# Patient Record
Sex: MALE | Race: WHITE | NOT HISPANIC OR LATINO | Employment: OTHER | URBAN - METROPOLITAN AREA
[De-identification: names, ages, dates, MRNs, and addresses within clinical notes are randomized per-mention and may not be internally consistent; named-entity substitution may affect disease eponyms.]

---

## 2022-01-01 ENCOUNTER — APPOINTMENT (OUTPATIENT)
Dept: SPEECH THERAPY | Facility: CLINIC | Age: 77
DRG: 064 | End: 2022-01-01
Payer: MEDICARE

## 2022-01-01 ENCOUNTER — APPOINTMENT (OUTPATIENT)
Dept: OCCUPATIONAL THERAPY | Facility: CLINIC | Age: 77
DRG: 064 | End: 2022-01-01
Payer: MEDICARE

## 2022-01-01 ENCOUNTER — APPOINTMENT (OUTPATIENT)
Dept: GENERAL RADIOLOGY | Facility: CLINIC | Age: 77
DRG: 064 | End: 2022-01-01
Attending: EMERGENCY MEDICINE
Payer: MEDICARE

## 2022-01-01 ENCOUNTER — APPOINTMENT (OUTPATIENT)
Dept: CT IMAGING | Facility: CLINIC | Age: 77
DRG: 064 | End: 2022-01-01
Attending: EMERGENCY MEDICINE
Payer: MEDICARE

## 2022-01-01 ENCOUNTER — APPOINTMENT (OUTPATIENT)
Dept: PHYSICAL THERAPY | Facility: CLINIC | Age: 77
DRG: 064 | End: 2022-01-01
Payer: MEDICARE

## 2022-01-01 ENCOUNTER — APPOINTMENT (OUTPATIENT)
Dept: GENERAL RADIOLOGY | Facility: CLINIC | Age: 77
End: 2022-01-01
Attending: PHYSICIAN ASSISTANT
Payer: MEDICARE

## 2022-01-01 ENCOUNTER — APPOINTMENT (OUTPATIENT)
Dept: GENERAL RADIOLOGY | Facility: CLINIC | Age: 77
DRG: 064 | End: 2022-01-01
Attending: INTERNAL MEDICINE
Payer: MEDICARE

## 2022-01-01 ENCOUNTER — APPOINTMENT (OUTPATIENT)
Dept: CT IMAGING | Facility: CLINIC | Age: 77
DRG: 064 | End: 2022-01-01
Attending: INTERNAL MEDICINE
Payer: MEDICARE

## 2022-01-01 ENCOUNTER — APPOINTMENT (OUTPATIENT)
Dept: CARDIOLOGY | Facility: CLINIC | Age: 77
DRG: 064 | End: 2022-01-01
Attending: HOSPITALIST
Payer: MEDICARE

## 2022-01-01 ENCOUNTER — HOSPITAL ENCOUNTER (EMERGENCY)
Facility: CLINIC | Age: 77
Discharge: HOME | End: 2022-10-10
Attending: PHYSICIAN ASSISTANT | Admitting: PHYSICIAN ASSISTANT
Payer: MEDICARE

## 2022-01-01 ENCOUNTER — APPOINTMENT (OUTPATIENT)
Dept: ULTRASOUND IMAGING | Facility: CLINIC | Age: 77
End: 2022-01-01
Attending: PHYSICIAN ASSISTANT
Payer: MEDICARE

## 2022-01-01 ENCOUNTER — APPOINTMENT (OUTPATIENT)
Dept: MRI IMAGING | Facility: CLINIC | Age: 77
DRG: 064 | End: 2022-01-01
Attending: EMERGENCY MEDICINE
Payer: MEDICARE

## 2022-01-01 ENCOUNTER — HOSPITAL ENCOUNTER (INPATIENT)
Facility: CLINIC | Age: 77
End: 2022-01-01
Payer: MEDICARE

## 2022-01-01 ENCOUNTER — APPOINTMENT (OUTPATIENT)
Dept: CT IMAGING | Facility: CLINIC | Age: 77
DRG: 064 | End: 2022-01-01
Attending: HOSPITALIST
Payer: MEDICARE

## 2022-01-01 ENCOUNTER — HOSPITAL ENCOUNTER (INPATIENT)
Facility: CLINIC | Age: 77
LOS: 9 days | Discharge: NURSING, REHAB OR LONG-TERM CARE | DRG: 064 | End: 2022-11-16
Attending: EMERGENCY MEDICINE | Admitting: INTERNAL MEDICINE
Payer: MEDICARE

## 2022-01-01 VITALS
DIASTOLIC BLOOD PRESSURE: 62 MMHG | BODY MASS INDEX: 28.12 KG/M2 | RESPIRATION RATE: 16 BRPM | WEIGHT: 175 LBS | HEART RATE: 68 BPM | HEIGHT: 66 IN | SYSTOLIC BLOOD PRESSURE: 137 MMHG | TEMPERATURE: 98.2 F | OXYGEN SATURATION: 98 %

## 2022-01-01 VITALS
RESPIRATION RATE: 16 BRPM | BODY MASS INDEX: 26.29 KG/M2 | OXYGEN SATURATION: 95 % | WEIGHT: 177.47 LBS | TEMPERATURE: 97.5 F | SYSTOLIC BLOOD PRESSURE: 121 MMHG | HEART RATE: 68 BPM | HEIGHT: 69 IN | DIASTOLIC BLOOD PRESSURE: 76 MMHG

## 2022-01-01 DIAGNOSIS — I50.32 CHRONIC HEART FAILURE WITH PRESERVED EJECTION FRACTION (HFPEF) (H): ICD-10-CM

## 2022-01-01 DIAGNOSIS — K59.00 CONSTIPATION, UNSPECIFIED CONSTIPATION TYPE: ICD-10-CM

## 2022-01-01 DIAGNOSIS — M79.604 PAIN OF RIGHT LOWER EXTREMITY: ICD-10-CM

## 2022-01-01 DIAGNOSIS — Z11.52 ENCOUNTER FOR SCREENING LABORATORY TESTING FOR SEVERE ACUTE RESPIRATORY SYNDROME CORONAVIRUS 2 (SARS-COV-2): ICD-10-CM

## 2022-01-01 DIAGNOSIS — R33.8 BENIGN PROSTATIC HYPERPLASIA WITH URINARY RETENTION: ICD-10-CM

## 2022-01-01 DIAGNOSIS — I63.9 ISCHEMIC STROKE (H): ICD-10-CM

## 2022-01-01 DIAGNOSIS — N40.1 BENIGN PROSTATIC HYPERPLASIA WITH URINARY RETENTION: ICD-10-CM

## 2022-01-01 DIAGNOSIS — I10 PRIMARY HYPERTENSION: Primary | ICD-10-CM

## 2022-01-01 DIAGNOSIS — I48.11 LONGSTANDING PERSISTENT ATRIAL FIBRILLATION (H): ICD-10-CM

## 2022-01-01 LAB
ALBUMIN SERPL BCG-MCNC: 3.2 G/DL (ref 3.5–5.2)
ALBUMIN UR-MCNC: 100 MG/DL
ALBUMIN UR-MCNC: 30 MG/DL
ALP SERPL-CCNC: 72 U/L (ref 40–129)
ALT SERPL W P-5'-P-CCNC: 16 U/L (ref 10–50)
AMMONIA PLAS-SCNC: 13 UMOL/L (ref 16–60)
ANION GAP SERPL CALCULATED.3IONS-SCNC: 10 MMOL/L (ref 7–15)
ANION GAP SERPL CALCULATED.3IONS-SCNC: 11 MMOL/L (ref 7–15)
ANION GAP SERPL CALCULATED.3IONS-SCNC: 4 MMOL/L (ref 7–15)
ANION GAP SERPL CALCULATED.3IONS-SCNC: 8 MMOL/L (ref 7–15)
ANION GAP SERPL CALCULATED.3IONS-SCNC: 9 MMOL/L (ref 7–15)
ANION GAP SERPL CALCULATED.3IONS-SCNC: 9 MMOL/L (ref 7–15)
APPEARANCE UR: ABNORMAL
APPEARANCE UR: CLEAR
APTT PPP: 31 SECONDS (ref 22–38)
AST SERPL W P-5'-P-CCNC: 27 U/L (ref 10–50)
BACTERIA BLD CULT: ABNORMAL
BACTERIA BLD CULT: ABNORMAL
BACTERIA BLD CULT: NO GROWTH
BASE EXCESS BLDA CALC-SCNC: 1.5 MMOL/L (ref -9–1.8)
BASOPHILS # BLD AUTO: 0 10E3/UL (ref 0–0.2)
BASOPHILS # BLD AUTO: 0 10E3/UL (ref 0–0.2)
BASOPHILS NFR BLD AUTO: 0 %
BASOPHILS NFR BLD AUTO: 0 %
BILIRUB DIRECT SERPL-MCNC: 0.32 MG/DL (ref 0–0.3)
BILIRUB SERPL-MCNC: 1.1 MG/DL
BILIRUB UR QL STRIP: ABNORMAL
BILIRUB UR QL STRIP: ABNORMAL
BUN SERPL-MCNC: 16 MG/DL (ref 8–23)
BUN SERPL-MCNC: 21.4 MG/DL (ref 8–23)
BUN SERPL-MCNC: 24.1 MG/DL (ref 8–23)
BUN SERPL-MCNC: 28.9 MG/DL (ref 8–23)
BUN SERPL-MCNC: 29.7 MG/DL (ref 8–23)
BUN SERPL-MCNC: 34.4 MG/DL (ref 8–23)
C PNEUM DNA SPEC QL NAA+PROBE: NOT DETECTED
CALCIUM SERPL-MCNC: 7.9 MG/DL (ref 8.8–10.2)
CALCIUM SERPL-MCNC: 8.3 MG/DL (ref 8.8–10.2)
CALCIUM SERPL-MCNC: 8.4 MG/DL (ref 8.8–10.2)
CALCIUM SERPL-MCNC: 8.4 MG/DL (ref 8.8–10.2)
CALCIUM SERPL-MCNC: 8.5 MG/DL (ref 8.8–10.2)
CALCIUM SERPL-MCNC: 8.8 MG/DL (ref 8.8–10.2)
CHLORIDE SERPL-SCNC: 101 MMOL/L (ref 98–107)
CHLORIDE SERPL-SCNC: 102 MMOL/L (ref 98–107)
CHLORIDE SERPL-SCNC: 99 MMOL/L (ref 98–107)
CHLORIDE SERPL-SCNC: 99 MMOL/L (ref 98–107)
CHOLEST SERPL-MCNC: 135 MG/DL
COLOR UR AUTO: ABNORMAL
COLOR UR AUTO: YELLOW
CREAT SERPL-MCNC: 0.8 MG/DL (ref 0.67–1.17)
CREAT SERPL-MCNC: 0.81 MG/DL (ref 0.67–1.17)
CREAT SERPL-MCNC: 0.84 MG/DL (ref 0.67–1.17)
CREAT SERPL-MCNC: 0.85 MG/DL (ref 0.67–1.17)
CREAT SERPL-MCNC: 0.85 MG/DL (ref 0.67–1.17)
CREAT SERPL-MCNC: 1.02 MG/DL (ref 0.67–1.17)
DEPRECATED HCO3 PLAS-SCNC: 22 MMOL/L (ref 22–29)
DEPRECATED HCO3 PLAS-SCNC: 24 MMOL/L (ref 22–29)
DEPRECATED HCO3 PLAS-SCNC: 25 MMOL/L (ref 22–29)
DEPRECATED HCO3 PLAS-SCNC: 28 MMOL/L (ref 22–29)
DEPRECATED HCO3 PLAS-SCNC: 29 MMOL/L (ref 22–29)
DEPRECATED HCO3 PLAS-SCNC: 31 MMOL/L (ref 22–29)
ENTEROCOCCUS FAECALIS: NOT DETECTED
ENTEROCOCCUS FAECIUM: NOT DETECTED
EOSINOPHIL # BLD AUTO: 0 10E3/UL (ref 0–0.7)
EOSINOPHIL # BLD AUTO: 0 10E3/UL (ref 0–0.7)
EOSINOPHIL NFR BLD AUTO: 0 %
EOSINOPHIL NFR BLD AUTO: 0 %
ERYTHROCYTE [DISTWIDTH] IN BLOOD BY AUTOMATED COUNT: 12.4 % (ref 10–15)
ERYTHROCYTE [DISTWIDTH] IN BLOOD BY AUTOMATED COUNT: 12.4 % (ref 10–15)
ERYTHROCYTE [DISTWIDTH] IN BLOOD BY AUTOMATED COUNT: 12.8 % (ref 10–15)
ERYTHROCYTE [DISTWIDTH] IN BLOOD BY AUTOMATED COUNT: 13.2 % (ref 10–15)
ERYTHROCYTE [DISTWIDTH] IN BLOOD BY AUTOMATED COUNT: 13.3 % (ref 10–15)
ETHANOL SERPL-MCNC: <0.01 G/DL
FERRITIN SERPL-MCNC: 278 NG/ML (ref 31–409)
FLUAV H1 2009 PAND RNA SPEC QL NAA+PROBE: NOT DETECTED
FLUAV H1 RNA SPEC QL NAA+PROBE: NOT DETECTED
FLUAV H3 RNA SPEC QL NAA+PROBE: DETECTED
FLUAV RNA SPEC QL NAA+PROBE: DETECTED
FLUBV RNA SPEC QL NAA+PROBE: NOT DETECTED
FOLATE SERPL-MCNC: 19.6 NG/ML (ref 4.6–34.8)
GFR SERPL CREATININE-BSD FRML MDRD: 75 ML/MIN/1.73M2
GFR SERPL CREATININE-BSD FRML MDRD: 88 ML/MIN/1.73M2
GFR SERPL CREATININE-BSD FRML MDRD: 88 ML/MIN/1.73M2
GFR SERPL CREATININE-BSD FRML MDRD: 89 ML/MIN/1.73M2
GFR SERPL CREATININE-BSD FRML MDRD: 90 ML/MIN/1.73M2
GFR SERPL CREATININE-BSD FRML MDRD: 90 ML/MIN/1.73M2
GLUCOSE BLDC GLUCOMTR-MCNC: 108 MG/DL (ref 70–99)
GLUCOSE BLDC GLUCOMTR-MCNC: 108 MG/DL (ref 70–99)
GLUCOSE BLDC GLUCOMTR-MCNC: 111 MG/DL (ref 70–99)
GLUCOSE BLDC GLUCOMTR-MCNC: 114 MG/DL (ref 70–99)
GLUCOSE BLDC GLUCOMTR-MCNC: 119 MG/DL (ref 70–99)
GLUCOSE BLDC GLUCOMTR-MCNC: 122 MG/DL (ref 70–99)
GLUCOSE BLDC GLUCOMTR-MCNC: 123 MG/DL (ref 70–99)
GLUCOSE BLDC GLUCOMTR-MCNC: 126 MG/DL (ref 70–99)
GLUCOSE BLDC GLUCOMTR-MCNC: 127 MG/DL (ref 70–99)
GLUCOSE BLDC GLUCOMTR-MCNC: 128 MG/DL (ref 70–99)
GLUCOSE BLDC GLUCOMTR-MCNC: 130 MG/DL (ref 70–99)
GLUCOSE BLDC GLUCOMTR-MCNC: 143 MG/DL (ref 70–99)
GLUCOSE BLDC GLUCOMTR-MCNC: 143 MG/DL (ref 70–99)
GLUCOSE BLDC GLUCOMTR-MCNC: 146 MG/DL (ref 70–99)
GLUCOSE BLDC GLUCOMTR-MCNC: 152 MG/DL (ref 70–99)
GLUCOSE BLDC GLUCOMTR-MCNC: 157 MG/DL (ref 70–99)
GLUCOSE BLDC GLUCOMTR-MCNC: 161 MG/DL (ref 70–99)
GLUCOSE BLDC GLUCOMTR-MCNC: 217 MG/DL (ref 70–99)
GLUCOSE BLDC GLUCOMTR-MCNC: 85 MG/DL (ref 70–99)
GLUCOSE BLDC GLUCOMTR-MCNC: 91 MG/DL (ref 70–99)
GLUCOSE SERPL-MCNC: 104 MG/DL (ref 70–99)
GLUCOSE SERPL-MCNC: 128 MG/DL (ref 70–99)
GLUCOSE SERPL-MCNC: 137 MG/DL (ref 70–99)
GLUCOSE SERPL-MCNC: 151 MG/DL (ref 70–99)
GLUCOSE SERPL-MCNC: 178 MG/DL (ref 70–99)
GLUCOSE SERPL-MCNC: 203 MG/DL (ref 70–99)
GLUCOSE UR STRIP-MCNC: NEGATIVE MG/DL
GLUCOSE UR STRIP-MCNC: NEGATIVE MG/DL
HADV DNA SPEC QL NAA+PROBE: NOT DETECTED
HBA1C MFR BLD: 5 %
HCO3 BLD-SCNC: 25 MMOL/L (ref 21–28)
HCOV PNL SPEC NAA+PROBE: NOT DETECTED
HCT VFR BLD AUTO: 28.4 % (ref 40–53)
HCT VFR BLD AUTO: 29.5 % (ref 40–53)
HCT VFR BLD AUTO: 29.8 % (ref 40–53)
HCT VFR BLD AUTO: 30.9 % (ref 40–53)
HCT VFR BLD AUTO: 31.4 % (ref 40–53)
HCT VFR BLD AUTO: 32 % (ref 40–53)
HCT VFR BLD AUTO: 33.2 % (ref 40–53)
HCT VFR BLD AUTO: 33.5 % (ref 40–53)
HCT VFR BLD AUTO: 34.3 % (ref 40–53)
HDLC SERPL-MCNC: 53 MG/DL
HGB BLD-MCNC: 10 G/DL (ref 13.3–17.7)
HGB BLD-MCNC: 10.1 G/DL (ref 13.3–17.7)
HGB BLD-MCNC: 10.5 G/DL (ref 13.3–17.7)
HGB BLD-MCNC: 10.6 G/DL (ref 13.3–17.7)
HGB BLD-MCNC: 10.7 G/DL (ref 13.3–17.7)
HGB BLD-MCNC: 11 G/DL (ref 13.3–17.7)
HGB BLD-MCNC: 11.1 G/DL (ref 13.3–17.7)
HGB BLD-MCNC: 11.7 G/DL (ref 13.3–17.7)
HGB BLD-MCNC: 9.5 G/DL (ref 13.3–17.7)
HGB UR QL STRIP: ABNORMAL
HGB UR QL STRIP: ABNORMAL
HMPV RNA SPEC QL NAA+PROBE: NOT DETECTED
HOLD SPECIMEN: NORMAL
HPIV1 RNA SPEC QL NAA+PROBE: NOT DETECTED
HPIV2 RNA SPEC QL NAA+PROBE: NOT DETECTED
HPIV3 RNA SPEC QL NAA+PROBE: NOT DETECTED
HPIV4 RNA SPEC QL NAA+PROBE: NOT DETECTED
IMM GRANULOCYTES # BLD: 0.1 10E3/UL
IMM GRANULOCYTES # BLD: 0.1 10E3/UL
IMM GRANULOCYTES NFR BLD: 0 %
IMM GRANULOCYTES NFR BLD: 0 %
INR PPP: 1.33 (ref 0.85–1.15)
IRON BINDING CAPACITY (ROCHE): 204 UG/DL (ref 240–430)
IRON SATN MFR SERPL: 7 % (ref 15–46)
IRON SERPL-MCNC: 14 UG/DL (ref 61–157)
KETONES UR STRIP-MCNC: 15 MG/DL
KETONES UR STRIP-MCNC: NEGATIVE MG/DL
L PNEUMO1 AG UR QL IA: NEGATIVE
LDLC SERPL CALC-MCNC: 69 MG/DL
LEUKOCYTE ESTERASE UR QL STRIP: NEGATIVE
LEUKOCYTE ESTERASE UR QL STRIP: NEGATIVE
LISTERIA SPECIES (DETECTED/NOT DETECTED): NOT DETECTED
LVEF ECHO: NORMAL
LVEF ECHO: NORMAL
LYMPHOCYTES # BLD AUTO: 0.6 10E3/UL (ref 0.8–5.3)
LYMPHOCYTES # BLD AUTO: 2.3 10E3/UL (ref 0.8–5.3)
LYMPHOCYTES NFR BLD AUTO: 16 %
LYMPHOCYTES NFR BLD AUTO: 3 %
M PNEUMO DNA SPEC QL NAA+PROBE: NOT DETECTED
MAGNESIUM SERPL-MCNC: 2 MG/DL (ref 1.7–2.3)
MAGNESIUM SERPL-MCNC: 2.2 MG/DL (ref 1.7–2.3)
MAGNESIUM SERPL-MCNC: 2.2 MG/DL (ref 1.7–2.3)
MAGNESIUM SERPL-MCNC: 2.3 MG/DL (ref 1.7–2.3)
MCH RBC QN AUTO: 31.2 PG (ref 26.5–33)
MCH RBC QN AUTO: 31.3 PG (ref 26.5–33)
MCH RBC QN AUTO: 31.3 PG (ref 26.5–33)
MCH RBC QN AUTO: 31.4 PG (ref 26.5–33)
MCH RBC QN AUTO: 31.4 PG (ref 26.5–33)
MCH RBC QN AUTO: 31.6 PG (ref 26.5–33)
MCH RBC QN AUTO: 31.9 PG (ref 26.5–33)
MCHC RBC AUTO-ENTMCNC: 32.8 G/DL (ref 31.5–36.5)
MCHC RBC AUTO-ENTMCNC: 33.4 G/DL (ref 31.5–36.5)
MCHC RBC AUTO-ENTMCNC: 33.5 G/DL (ref 31.5–36.5)
MCHC RBC AUTO-ENTMCNC: 33.9 G/DL (ref 31.5–36.5)
MCHC RBC AUTO-ENTMCNC: 33.9 G/DL (ref 31.5–36.5)
MCHC RBC AUTO-ENTMCNC: 34.1 G/DL (ref 31.5–36.5)
MCHC RBC AUTO-ENTMCNC: 34.3 G/DL (ref 31.5–36.5)
MCV RBC AUTO: 92 FL (ref 78–100)
MCV RBC AUTO: 94 FL (ref 78–100)
MCV RBC AUTO: 95 FL (ref 78–100)
MCV RBC AUTO: 96 FL (ref 78–100)
MONOCYTES # BLD AUTO: 1.5 10E3/UL (ref 0–1.3)
MONOCYTES # BLD AUTO: 1.6 10E3/UL (ref 0–1.3)
MONOCYTES NFR BLD AUTO: 11 %
MONOCYTES NFR BLD AUTO: 9 %
MUCOUS THREADS #/AREA URNS LPF: PRESENT /LPF
MUCOUS THREADS #/AREA URNS LPF: PRESENT /LPF
NEUTROPHILS # BLD AUTO: 10.4 10E3/UL (ref 1.6–8.3)
NEUTROPHILS # BLD AUTO: 15.1 10E3/UL (ref 1.6–8.3)
NEUTROPHILS NFR BLD AUTO: 73 %
NEUTROPHILS NFR BLD AUTO: 88 %
NITRATE UR QL: NEGATIVE
NITRATE UR QL: NEGATIVE
NONHDLC SERPL-MCNC: 82 MG/DL
NRBC # BLD AUTO: 0 10E3/UL
NRBC # BLD AUTO: 0 10E3/UL
NRBC BLD AUTO-RTO: 0 /100
NRBC BLD AUTO-RTO: 0 /100
NT-PROBNP SERPL-MCNC: 7049 PG/ML (ref 0–1800)
O2/TOTAL GAS SETTING VFR VENT: 2 %
PCO2 BLD: 34 MM HG (ref 35–45)
PH BLD: 7.48 [PH] (ref 7.35–7.45)
PH UR STRIP: 5 [PH] (ref 5–7)
PH UR STRIP: 5 [PH] (ref 5–7)
PLATELET # BLD AUTO: 165 10E3/UL (ref 150–450)
PLATELET # BLD AUTO: 170 10E3/UL (ref 150–450)
PLATELET # BLD AUTO: 178 10E3/UL (ref 150–450)
PLATELET # BLD AUTO: 190 10E3/UL (ref 150–450)
PLATELET # BLD AUTO: 202 10E3/UL (ref 150–450)
PLATELET # BLD AUTO: 248 10E3/UL (ref 150–450)
PLATELET # BLD AUTO: 256 10E3/UL (ref 150–450)
PLATELET # BLD AUTO: 321 10E3/UL (ref 150–450)
PLATELET # BLD AUTO: 367 10E3/UL (ref 150–450)
PO2 BLD: 72 MM HG (ref 80–105)
POTASSIUM SERPL-SCNC: 3.7 MMOL/L (ref 3.4–5.3)
POTASSIUM SERPL-SCNC: 3.8 MMOL/L (ref 3.4–5.3)
POTASSIUM SERPL-SCNC: 3.9 MMOL/L (ref 3.4–5.3)
POTASSIUM SERPL-SCNC: 3.9 MMOL/L (ref 3.4–5.3)
POTASSIUM SERPL-SCNC: 4 MMOL/L (ref 3.4–5.3)
POTASSIUM SERPL-SCNC: 4.1 MMOL/L (ref 3.4–5.3)
PROCALCITONIN SERPL IA-MCNC: 0.55 NG/ML
PROT SERPL-MCNC: 5.8 G/DL (ref 6.4–8.3)
RADIOLOGIST FLAGS: ABNORMAL
RBC # BLD AUTO: 3.01 10E6/UL (ref 4.4–5.9)
RBC # BLD AUTO: 3.13 10E6/UL (ref 4.4–5.9)
RBC # BLD AUTO: 3.24 10E6/UL (ref 4.4–5.9)
RBC # BLD AUTO: 3.32 10E6/UL (ref 4.4–5.9)
RBC # BLD AUTO: 3.35 10E6/UL (ref 4.4–5.9)
RBC # BLD AUTO: 3.42 10E6/UL (ref 4.4–5.9)
RBC # BLD AUTO: 3.5 10E6/UL (ref 4.4–5.9)
RBC # BLD AUTO: 3.53 10E6/UL (ref 4.4–5.9)
RBC # BLD AUTO: 3.74 10E6/UL (ref 4.4–5.9)
RBC URINE: 7 /HPF
RBC URINE: >182 /HPF
RSV RNA SPEC QL NAA+PROBE: NOT DETECTED
RSV RNA SPEC QL NAA+PROBE: NOT DETECTED
RV+EV RNA SPEC QL NAA+PROBE: NOT DETECTED
S PNEUM AG SPEC QL: NEGATIVE
SARS-COV-2 RNA RESP QL NAA+PROBE: NEGATIVE
SODIUM SERPL-SCNC: 133 MMOL/L (ref 136–145)
SODIUM SERPL-SCNC: 135 MMOL/L (ref 136–145)
SODIUM SERPL-SCNC: 136 MMOL/L (ref 136–145)
SODIUM SERPL-SCNC: 136 MMOL/L (ref 136–145)
SODIUM SERPL-SCNC: 137 MMOL/L (ref 136–145)
SODIUM SERPL-SCNC: 138 MMOL/L (ref 136–145)
SP GR UR STRIP: 1.02 (ref 1–1.03)
SP GR UR STRIP: 1.02 (ref 1–1.03)
STAPHYLOCOCCUS AUREUS: NOT DETECTED
STAPHYLOCOCCUS EPIDERMIDIS: DETECTED
STAPHYLOCOCCUS LUGDUNENSIS: NOT DETECTED
STREPTOCOCCUS AGALACTIAE: NOT DETECTED
STREPTOCOCCUS ANGINOSUS GROUP: NOT DETECTED
STREPTOCOCCUS PNEUMONIAE: NOT DETECTED
STREPTOCOCCUS PYOGENES: NOT DETECTED
STREPTOCOCCUS SPECIES: NOT DETECTED
TRIGL SERPL-MCNC: 66 MG/DL
TROPONIN T SERPL HS-MCNC: 298 NG/L
TROPONIN T SERPL HS-MCNC: 35 NG/L
TROPONIN T SERPL HS-MCNC: 36 NG/L
TROPONIN T SERPL HS-MCNC: 366 NG/L
TROPONIN T SERPL HS-MCNC: 374 NG/L
TSH SERPL DL<=0.005 MIU/L-ACNC: 0.87 UIU/ML (ref 0.3–4.2)
UROBILINOGEN UR STRIP-MCNC: 2 MG/DL
UROBILINOGEN UR STRIP-MCNC: NORMAL MG/DL
VIT B1 PYROPHOSHATE BLD-SCNC: 158 NMOL/L
VIT B12 SERPL-MCNC: 260 PG/ML (ref 232–1245)
WBC # BLD AUTO: 10.5 10E3/UL (ref 4–11)
WBC # BLD AUTO: 10.5 10E3/UL (ref 4–11)
WBC # BLD AUTO: 13.2 10E3/UL (ref 4–11)
WBC # BLD AUTO: 14.3 10E3/UL (ref 4–11)
WBC # BLD AUTO: 14.7 10E3/UL (ref 4–11)
WBC # BLD AUTO: 17.4 10E3/UL (ref 4–11)
WBC # BLD AUTO: 8.6 10E3/UL (ref 4–11)
WBC # BLD AUTO: 9.2 10E3/UL (ref 4–11)
WBC # BLD AUTO: 9.9 10E3/UL (ref 4–11)
WBC URINE: 0 /HPF
WBC URINE: 3 /HPF

## 2022-01-01 PROCEDURE — 999N000208 ECHOCARDIOGRAM LIMITED

## 2022-01-01 PROCEDURE — 94640 AIRWAY INHALATION TREATMENT: CPT | Mod: 76

## 2022-01-01 PROCEDURE — 250N000013 HC RX MED GY IP 250 OP 250 PS 637: Performed by: HOSPITALIST

## 2022-01-01 PROCEDURE — 85025 COMPLETE CBC W/AUTO DIFF WBC: CPT | Performed by: EMERGENCY MEDICINE

## 2022-01-01 PROCEDURE — 87633 RESP VIRUS 12-25 TARGETS: CPT | Performed by: INTERNAL MEDICINE

## 2022-01-01 PROCEDURE — 81001 URINALYSIS AUTO W/SCOPE: CPT | Performed by: INTERNAL MEDICINE

## 2022-01-01 PROCEDURE — 250N000012 HC RX MED GY IP 250 OP 636 PS 637: Performed by: INTERNAL MEDICINE

## 2022-01-01 PROCEDURE — 82803 BLOOD GASES ANY COMBINATION: CPT | Performed by: HOSPITALIST

## 2022-01-01 PROCEDURE — 82140 ASSAY OF AMMONIA: CPT | Performed by: EMERGENCY MEDICINE

## 2022-01-01 PROCEDURE — 120N000004 HC R&B MS OVERFLOW

## 2022-01-01 PROCEDURE — 71045 X-RAY EXAM CHEST 1 VIEW: CPT

## 2022-01-01 PROCEDURE — 255N000002 HC RX 255 OP 636: Performed by: EMERGENCY MEDICINE

## 2022-01-01 PROCEDURE — 250N000011 HC RX IP 250 OP 636: Performed by: HOSPITALIST

## 2022-01-01 PROCEDURE — 87077 CULTURE AEROBIC IDENTIFY: CPT | Performed by: INTERNAL MEDICINE

## 2022-01-01 PROCEDURE — 999N000157 HC STATISTIC RCP TIME EA 10 MIN

## 2022-01-01 PROCEDURE — 94640 AIRWAY INHALATION TREATMENT: CPT

## 2022-01-01 PROCEDURE — 36415 COLL VENOUS BLD VENIPUNCTURE: CPT | Performed by: HOSPITALIST

## 2022-01-01 PROCEDURE — 258N000003 HC RX IP 258 OP 636: Performed by: HOSPITALIST

## 2022-01-01 PROCEDURE — 250N000009 HC RX 250: Performed by: HOSPITALIST

## 2022-01-01 PROCEDURE — 250N000013 HC RX MED GY IP 250 OP 250 PS 637: Performed by: INTERNAL MEDICINE

## 2022-01-01 PROCEDURE — 82728 ASSAY OF FERRITIN: CPT | Performed by: INTERNAL MEDICINE

## 2022-01-01 PROCEDURE — 250N000011 HC RX IP 250 OP 636: Performed by: INTERNAL MEDICINE

## 2022-01-01 PROCEDURE — C9803 HOPD COVID-19 SPEC COLLECT: HCPCS | Performed by: EMERGENCY MEDICINE

## 2022-01-01 PROCEDURE — 97110 THERAPEUTIC EXERCISES: CPT | Mod: GO

## 2022-01-01 PROCEDURE — 97535 SELF CARE MNGMENT TRAINING: CPT | Mod: GO

## 2022-01-01 PROCEDURE — 97110 THERAPEUTIC EXERCISES: CPT | Mod: GO | Performed by: OCCUPATIONAL THERAPIST

## 2022-01-01 PROCEDURE — 36415 COLL VENOUS BLD VENIPUNCTURE: CPT | Performed by: INTERNAL MEDICINE

## 2022-01-01 PROCEDURE — 250N000012 HC RX MED GY IP 250 OP 636 PS 637: Performed by: HOSPITALIST

## 2022-01-01 PROCEDURE — G1010 CDSM STANSON: HCPCS

## 2022-01-01 PROCEDURE — 93321 DOPPLER ECHO F-UP/LMTD STD: CPT | Mod: 26 | Performed by: INTERNAL MEDICINE

## 2022-01-01 PROCEDURE — 97535 SELF CARE MNGMENT TRAINING: CPT | Mod: GO | Performed by: OCCUPATIONAL THERAPIST

## 2022-01-01 PROCEDURE — 82607 VITAMIN B-12: CPT | Performed by: EMERGENCY MEDICINE

## 2022-01-01 PROCEDURE — 99282 EMERGENCY DEPT VISIT SF MDM: CPT | Performed by: PHYSICIAN ASSISTANT

## 2022-01-01 PROCEDURE — A9585 GADOBUTROL INJECTION: HCPCS | Performed by: EMERGENCY MEDICINE

## 2022-01-01 PROCEDURE — 250N000013 HC RX MED GY IP 250 OP 250 PS 637: Performed by: PHYSICIAN ASSISTANT

## 2022-01-01 PROCEDURE — 258N000003 HC RX IP 258 OP 636: Performed by: INTERNAL MEDICINE

## 2022-01-01 PROCEDURE — 999N000208 ECHOCARDIOGRAM COMPLETE

## 2022-01-01 PROCEDURE — 82248 BILIRUBIN DIRECT: CPT | Performed by: EMERGENCY MEDICINE

## 2022-01-01 PROCEDURE — 84443 ASSAY THYROID STIM HORMONE: CPT | Performed by: INTERNAL MEDICINE

## 2022-01-01 PROCEDURE — 99233 SBSQ HOSP IP/OBS HIGH 50: CPT | Performed by: HOSPITALIST

## 2022-01-01 PROCEDURE — 99284 EMERGENCY DEPT VISIT MOD MDM: CPT | Mod: 25 | Performed by: PHYSICIAN ASSISTANT

## 2022-01-01 PROCEDURE — 99232 SBSQ HOSP IP/OBS MODERATE 35: CPT | Performed by: INTERNAL MEDICINE

## 2022-01-01 PROCEDURE — 97112 NEUROMUSCULAR REEDUCATION: CPT | Mod: GP | Performed by: PHYSICAL THERAPIST

## 2022-01-01 PROCEDURE — 80048 BASIC METABOLIC PNL TOTAL CA: CPT | Performed by: HOSPITALIST

## 2022-01-01 PROCEDURE — 92523 SPEECH SOUND LANG COMPREHEN: CPT | Mod: GN | Performed by: SPEECH-LANGUAGE PATHOLOGIST

## 2022-01-01 PROCEDURE — 255N000002 HC RX 255 OP 636: Performed by: HOSPITALIST

## 2022-01-01 PROCEDURE — 82310 ASSAY OF CALCIUM: CPT | Performed by: EMERGENCY MEDICINE

## 2022-01-01 PROCEDURE — 250N000009 HC RX 250: Performed by: RADIOLOGY

## 2022-01-01 PROCEDURE — 73552 X-RAY EXAM OF FEMUR 2/>: CPT | Mod: RT

## 2022-01-01 PROCEDURE — 93005 ELECTROCARDIOGRAM TRACING: CPT | Performed by: EMERGENCY MEDICINE

## 2022-01-01 PROCEDURE — 250N000011 HC RX IP 250 OP 636: Performed by: RADIOLOGY

## 2022-01-01 PROCEDURE — 97530 THERAPEUTIC ACTIVITIES: CPT | Mod: GP | Performed by: PHYSICAL THERAPIST

## 2022-01-01 PROCEDURE — 82947 ASSAY GLUCOSE BLOOD QUANT: CPT | Performed by: HOSPITALIST

## 2022-01-01 PROCEDURE — 83735 ASSAY OF MAGNESIUM: CPT | Performed by: HOSPITALIST

## 2022-01-01 PROCEDURE — 87899 AGENT NOS ASSAY W/OPTIC: CPT | Performed by: HOSPITALIST

## 2022-01-01 PROCEDURE — G0425 INPT/ED TELECONSULT30: HCPCS | Mod: G0 | Performed by: PHYSICIAN ASSISTANT

## 2022-01-01 PROCEDURE — 200N000001 HC R&B ICU

## 2022-01-01 PROCEDURE — 87486 CHLMYD PNEUM DNA AMP PROBE: CPT | Performed by: INTERNAL MEDICINE

## 2022-01-01 PROCEDURE — 99223 1ST HOSP IP/OBS HIGH 75: CPT | Mod: AI | Performed by: INTERNAL MEDICINE

## 2022-01-01 PROCEDURE — 99232 SBSQ HOSP IP/OBS MODERATE 35: CPT | Performed by: HOSPITALIST

## 2022-01-01 PROCEDURE — 250N000009 HC RX 250: Performed by: EMERGENCY MEDICINE

## 2022-01-01 PROCEDURE — 93306 TTE W/DOPPLER COMPLETE: CPT | Mod: 26 | Performed by: INTERNAL MEDICINE

## 2022-01-01 PROCEDURE — 85014 HEMATOCRIT: CPT | Performed by: HOSPITALIST

## 2022-01-01 PROCEDURE — 85027 COMPLETE CBC AUTOMATED: CPT | Performed by: HOSPITALIST

## 2022-01-01 PROCEDURE — 99239 HOSP IP/OBS DSCHRG MGMT >30: CPT | Performed by: INTERNAL MEDICINE

## 2022-01-01 PROCEDURE — 36415 COLL VENOUS BLD VENIPUNCTURE: CPT | Performed by: EMERGENCY MEDICINE

## 2022-01-01 PROCEDURE — 92526 ORAL FUNCTION THERAPY: CPT | Mod: GN | Performed by: SPEECH-LANGUAGE PATHOLOGIST

## 2022-01-01 PROCEDURE — 85730 THROMBOPLASTIN TIME PARTIAL: CPT | Performed by: EMERGENCY MEDICINE

## 2022-01-01 PROCEDURE — 36600 WITHDRAWAL OF ARTERIAL BLOOD: CPT

## 2022-01-01 PROCEDURE — 87149 DNA/RNA DIRECT PROBE: CPT | Performed by: INTERNAL MEDICINE

## 2022-01-01 PROCEDURE — 85025 COMPLETE CBC W/AUTO DIFF WBC: CPT | Performed by: INTERNAL MEDICINE

## 2022-01-01 PROCEDURE — 74177 CT ABD & PELVIS W/CONTRAST: CPT | Mod: MG

## 2022-01-01 PROCEDURE — 84484 ASSAY OF TROPONIN QUANT: CPT | Performed by: EMERGENCY MEDICINE

## 2022-01-01 PROCEDURE — U0003 INFECTIOUS AGENT DETECTION BY NUCLEIC ACID (DNA OR RNA); SEVERE ACUTE RESPIRATORY SYNDROME CORONAVIRUS 2 (SARS-COV-2) (CORONAVIRUS DISEASE [COVID-19]), AMPLIFIED PROBE TECHNIQUE, MAKING USE OF HIGH THROUGHPUT TECHNOLOGIES AS DESCRIBED BY CMS-2020-01-R: HCPCS | Performed by: EMERGENCY MEDICINE

## 2022-01-01 PROCEDURE — 250N000013 HC RX MED GY IP 250 OP 250 PS 637: Performed by: EMERGENCY MEDICINE

## 2022-01-01 PROCEDURE — 83036 HEMOGLOBIN GLYCOSYLATED A1C: CPT | Performed by: INTERNAL MEDICINE

## 2022-01-01 PROCEDURE — 84145 PROCALCITONIN (PCT): CPT | Performed by: HOSPITALIST

## 2022-01-01 PROCEDURE — 99291 CRITICAL CARE FIRST HOUR: CPT | Mod: 25 | Performed by: EMERGENCY MEDICINE

## 2022-01-01 PROCEDURE — 97130 THER IVNTJ EA ADDL 15 MIN: CPT | Mod: GN | Performed by: SPEECH-LANGUAGE PATHOLOGIST

## 2022-01-01 PROCEDURE — 70498 CT ANGIOGRAPHY NECK: CPT | Mod: MG

## 2022-01-01 PROCEDURE — 84484 ASSAY OF TROPONIN QUANT: CPT | Performed by: INTERNAL MEDICINE

## 2022-01-01 PROCEDURE — 250N000011 HC RX IP 250 OP 636: Performed by: EMERGENCY MEDICINE

## 2022-01-01 PROCEDURE — 93308 TTE F-UP OR LMTD: CPT | Mod: 26 | Performed by: INTERNAL MEDICINE

## 2022-01-01 PROCEDURE — 82310 ASSAY OF CALCIUM: CPT | Performed by: HOSPITALIST

## 2022-01-01 PROCEDURE — 85018 HEMOGLOBIN: CPT | Performed by: HOSPITALIST

## 2022-01-01 PROCEDURE — 74018 RADEX ABDOMEN 1 VIEW: CPT

## 2022-01-01 PROCEDURE — 80061 LIPID PANEL: CPT | Performed by: INTERNAL MEDICINE

## 2022-01-01 PROCEDURE — 97116 GAIT TRAINING THERAPY: CPT | Mod: GP | Performed by: PHYSICAL THERAPIST

## 2022-01-01 PROCEDURE — 92610 EVALUATE SWALLOWING FUNCTION: CPT | Mod: GN | Performed by: SPEECH-LANGUAGE PATHOLOGIST

## 2022-01-01 PROCEDURE — 97112 NEUROMUSCULAR REEDUCATION: CPT | Mod: GP

## 2022-01-01 PROCEDURE — 85610 PROTHROMBIN TIME: CPT | Performed by: EMERGENCY MEDICINE

## 2022-01-01 PROCEDURE — 82746 ASSAY OF FOLIC ACID SERUM: CPT | Performed by: EMERGENCY MEDICINE

## 2022-01-01 PROCEDURE — 93971 EXTREMITY STUDY: CPT | Mod: RT

## 2022-01-01 PROCEDURE — 93010 ELECTROCARDIOGRAM REPORT: CPT | Performed by: EMERGENCY MEDICINE

## 2022-01-01 PROCEDURE — 93005 ELECTROCARDIOGRAM TRACING: CPT

## 2022-01-01 PROCEDURE — 99292 CRITICAL CARE ADDL 30 MIN: CPT | Performed by: EMERGENCY MEDICINE

## 2022-01-01 PROCEDURE — 84425 ASSAY OF VITAMIN B-1: CPT | Performed by: EMERGENCY MEDICINE

## 2022-01-01 PROCEDURE — 97129 THER IVNTJ 1ST 15 MIN: CPT | Mod: GN | Performed by: SPEECH-LANGUAGE PATHOLOGIST

## 2022-01-01 PROCEDURE — 255N000002 HC RX 255 OP 636: Performed by: INTERNAL MEDICINE

## 2022-01-01 PROCEDURE — 97116 GAIT TRAINING THERAPY: CPT | Mod: GP

## 2022-01-01 PROCEDURE — 93325 DOPPLER ECHO COLOR FLOW MAPG: CPT | Mod: 26 | Performed by: INTERNAL MEDICINE

## 2022-01-01 PROCEDURE — 81001 URINALYSIS AUTO W/SCOPE: CPT | Performed by: HOSPITALIST

## 2022-01-01 PROCEDURE — 82077 ASSAY SPEC XCP UR&BREATH IA: CPT | Performed by: EMERGENCY MEDICINE

## 2022-01-01 PROCEDURE — 83550 IRON BINDING TEST: CPT | Performed by: INTERNAL MEDICINE

## 2022-01-01 PROCEDURE — 83880 ASSAY OF NATRIURETIC PEPTIDE: CPT | Performed by: HOSPITALIST

## 2022-01-01 PROCEDURE — 97165 OT EVAL LOW COMPLEX 30 MIN: CPT | Mod: GO

## 2022-01-01 PROCEDURE — 97161 PT EVAL LOW COMPLEX 20 MIN: CPT | Mod: GP | Performed by: PHYSICAL THERAPIST

## 2022-01-01 RX ORDER — LISINOPRIL 40 MG/1
40 TABLET ORAL DAILY
Status: DISCONTINUED | OUTPATIENT
Start: 2022-01-01 | End: 2022-01-01

## 2022-01-01 RX ORDER — IOPAMIDOL 755 MG/ML
78 INJECTION, SOLUTION INTRAVASCULAR ONCE
Status: COMPLETED | OUTPATIENT
Start: 2022-01-01 | End: 2022-01-01

## 2022-01-01 RX ORDER — FUROSEMIDE 20 MG
20 TABLET ORAL DAILY
Status: DISCONTINUED | OUTPATIENT
Start: 2022-01-01 | End: 2022-01-01 | Stop reason: HOSPADM

## 2022-01-01 RX ORDER — METOPROLOL SUCCINATE 100 MG/1
100 TABLET, EXTENDED RELEASE ORAL DAILY
Status: DISCONTINUED | OUTPATIENT
Start: 2022-01-01 | End: 2022-01-01 | Stop reason: HOSPADM

## 2022-01-01 RX ORDER — DILTIAZEM HYDROCHLORIDE 5 MG/ML
20 INJECTION INTRAVENOUS ONCE
Status: COMPLETED | OUTPATIENT
Start: 2022-01-01 | End: 2022-01-01

## 2022-01-01 RX ORDER — DILTIAZEM HCL IN NACL,ISO-OSM 125 MG/125
5-15 PLASTIC BAG, INJECTION (ML) INTRAVENOUS CONTINUOUS
Status: DISCONTINUED | OUTPATIENT
Start: 2022-01-01 | End: 2022-01-01

## 2022-01-01 RX ORDER — PREDNISONE 20 MG/1
40 TABLET ORAL DAILY
Status: DISCONTINUED | OUTPATIENT
Start: 2022-01-01 | End: 2022-01-01 | Stop reason: HOSPADM

## 2022-01-01 RX ORDER — LIDOCAINE 40 MG/G
CREAM TOPICAL
Status: DISCONTINUED | OUTPATIENT
Start: 2022-01-01 | End: 2022-01-01 | Stop reason: HOSPADM

## 2022-01-01 RX ORDER — FUROSEMIDE 10 MG/ML
40 INJECTION INTRAMUSCULAR; INTRAVENOUS ONCE
Status: COMPLETED | OUTPATIENT
Start: 2022-01-01 | End: 2022-01-01

## 2022-01-01 RX ORDER — FERROUS SULFATE 325(65) MG
325 TABLET ORAL 2 TIMES DAILY
Status: DISCONTINUED | OUTPATIENT
Start: 2022-01-01 | End: 2022-01-01 | Stop reason: HOSPADM

## 2022-01-01 RX ORDER — SODIUM CHLORIDE 9 MG/ML
INJECTION, SOLUTION INTRAVENOUS CONTINUOUS
Status: DISCONTINUED | OUTPATIENT
Start: 2022-01-01 | End: 2022-01-01

## 2022-01-01 RX ORDER — PREDNISONE 20 MG/1
40 TABLET ORAL DAILY
Status: DISCONTINUED | OUTPATIENT
Start: 2022-01-01 | End: 2022-01-01

## 2022-01-01 RX ORDER — FUROSEMIDE 10 MG/ML
20 INJECTION INTRAMUSCULAR; INTRAVENOUS ONCE
Status: COMPLETED | OUTPATIENT
Start: 2022-01-01 | End: 2022-01-01

## 2022-01-01 RX ORDER — GADOBUTROL 604.72 MG/ML
8 INJECTION INTRAVENOUS ONCE
Status: COMPLETED | OUTPATIENT
Start: 2022-01-01 | End: 2022-01-01

## 2022-01-01 RX ORDER — METOPROLOL SUCCINATE 100 MG/1
100 TABLET, EXTENDED RELEASE ORAL DAILY
Status: DISCONTINUED | OUTPATIENT
Start: 2022-01-01 | End: 2022-01-01 | Stop reason: DRUGHIGH

## 2022-01-01 RX ORDER — LABETALOL HYDROCHLORIDE 5 MG/ML
10 INJECTION, SOLUTION INTRAVENOUS EVERY 6 HOURS PRN
Status: DISCONTINUED | OUTPATIENT
Start: 2022-01-01 | End: 2022-01-01

## 2022-01-01 RX ORDER — METOPROLOL TARTRATE 25 MG/1
25 TABLET, FILM COATED ORAL 2 TIMES DAILY
Status: DISCONTINUED | OUTPATIENT
Start: 2022-01-01 | End: 2022-01-01

## 2022-01-01 RX ORDER — SILDENAFIL 100 MG/1
50 TABLET, FILM COATED ORAL DAILY PRN
COMMUNITY
Start: 2022-01-01

## 2022-01-01 RX ORDER — METOPROLOL TARTRATE 25 MG/1
50 TABLET, FILM COATED ORAL 2 TIMES DAILY
Status: COMPLETED | OUTPATIENT
Start: 2022-01-01 | End: 2022-01-01

## 2022-01-01 RX ORDER — IOPAMIDOL 755 MG/ML
86 INJECTION, SOLUTION INTRAVASCULAR ONCE
Status: COMPLETED | OUTPATIENT
Start: 2022-01-01 | End: 2022-01-01

## 2022-01-01 RX ORDER — TAMSULOSIN HYDROCHLORIDE 0.4 MG/1
0.4 CAPSULE ORAL DAILY
DISCHARGE
Start: 2022-01-01

## 2022-01-01 RX ORDER — HYDROCHLOROTHIAZIDE 12.5 MG/1
1 TABLET ORAL DAILY
Status: ON HOLD | COMMUNITY
Start: 2022-01-01 | End: 2022-01-01

## 2022-01-01 RX ORDER — LISINOPRIL 40 MG/1
20 TABLET ORAL DAILY
DISCHARGE
Start: 2022-01-01

## 2022-01-01 RX ORDER — ATORVASTATIN CALCIUM 10 MG/1
10 TABLET, FILM COATED ORAL DAILY
Status: DISCONTINUED | OUTPATIENT
Start: 2022-01-01 | End: 2022-01-01 | Stop reason: HOSPADM

## 2022-01-01 RX ORDER — ATORVASTATIN CALCIUM 10 MG/1
10 TABLET, FILM COATED ORAL DAILY
DISCHARGE
Start: 2022-01-01

## 2022-01-01 RX ORDER — BENZONATATE 100 MG/1
100 CAPSULE ORAL 3 TIMES DAILY PRN
Status: DISCONTINUED | OUTPATIENT
Start: 2022-01-01 | End: 2022-01-01 | Stop reason: HOSPADM

## 2022-01-01 RX ORDER — METOPROLOL TARTRATE 25 MG/1
25 TABLET, FILM COATED ORAL ONCE
Status: COMPLETED | OUTPATIENT
Start: 2022-01-01 | End: 2022-01-01

## 2022-01-01 RX ORDER — ATORVASTATIN CALCIUM 20 MG/1
40 TABLET, FILM COATED ORAL DAILY
Status: DISCONTINUED | OUTPATIENT
Start: 2022-01-01 | End: 2022-01-01

## 2022-01-01 RX ORDER — LISINOPRIL 40 MG/1
1 TABLET ORAL DAILY
Status: ON HOLD | COMMUNITY
Start: 2022-01-01 | End: 2022-01-01

## 2022-01-01 RX ORDER — POTASSIUM CHLORIDE 1500 MG/1
20 TABLET, EXTENDED RELEASE ORAL ONCE
Status: COMPLETED | OUTPATIENT
Start: 2022-01-01 | End: 2022-01-01

## 2022-01-01 RX ORDER — AMLODIPINE BESYLATE 10 MG/1
10 TABLET ORAL DAILY
Status: DISCONTINUED | OUTPATIENT
Start: 2022-01-01 | End: 2022-01-01 | Stop reason: HOSPADM

## 2022-01-01 RX ORDER — FUROSEMIDE 10 MG/ML
INJECTION INTRAMUSCULAR; INTRAVENOUS
Status: DISPENSED
Start: 2022-01-01 | End: 2022-01-01

## 2022-01-01 RX ORDER — CEFTRIAXONE 1 G/1
1 INJECTION, POWDER, FOR SOLUTION INTRAMUSCULAR; INTRAVENOUS EVERY 24 HOURS
Status: DISCONTINUED | OUTPATIENT
Start: 2022-01-01 | End: 2022-01-01

## 2022-01-01 RX ORDER — ASPIRIN 325 MG
325 TABLET ORAL DAILY
Status: DISCONTINUED | OUTPATIENT
Start: 2022-01-01 | End: 2022-01-01

## 2022-01-01 RX ORDER — TAMSULOSIN HYDROCHLORIDE 0.4 MG/1
0.4 CAPSULE ORAL DAILY
Status: DISCONTINUED | OUTPATIENT
Start: 2022-01-01 | End: 2022-01-01 | Stop reason: HOSPADM

## 2022-01-01 RX ORDER — LABETALOL HYDROCHLORIDE 5 MG/ML
10 INJECTION, SOLUTION INTRAVENOUS EVERY 6 HOURS PRN
Status: DISCONTINUED | OUTPATIENT
Start: 2022-01-01 | End: 2022-01-01 | Stop reason: HOSPADM

## 2022-01-01 RX ORDER — SENNOSIDES A AND B 8.6 MG/1
2 TABLET, FILM COATED ORAL 2 TIMES DAILY PRN
DISCHARGE
Start: 2022-01-01

## 2022-01-01 RX ORDER — AMLODIPINE BESYLATE 10 MG/1
1 TABLET ORAL DAILY
Status: ON HOLD | COMMUNITY
Start: 2022-01-01 | End: 2022-01-01

## 2022-01-01 RX ORDER — IPRATROPIUM BROMIDE AND ALBUTEROL SULFATE 2.5; .5 MG/3ML; MG/3ML
3 SOLUTION RESPIRATORY (INHALATION) EVERY 4 HOURS PRN
Status: DISCONTINUED | OUTPATIENT
Start: 2022-01-01 | End: 2022-01-01 | Stop reason: HOSPADM

## 2022-01-01 RX ORDER — OSELTAMIVIR PHOSPHATE 75 MG/1
75 CAPSULE ORAL 2 TIMES DAILY
Status: COMPLETED | OUTPATIENT
Start: 2022-01-01 | End: 2022-01-01

## 2022-01-01 RX ORDER — IPRATROPIUM BROMIDE AND ALBUTEROL SULFATE 2.5; .5 MG/3ML; MG/3ML
3 SOLUTION RESPIRATORY (INHALATION)
Status: DISCONTINUED | OUTPATIENT
Start: 2022-01-01 | End: 2022-01-01

## 2022-01-01 RX ORDER — FUROSEMIDE 10 MG/ML
40 INJECTION INTRAMUSCULAR; INTRAVENOUS EVERY 12 HOURS
Status: DISCONTINUED | OUTPATIENT
Start: 2022-01-01 | End: 2022-01-01

## 2022-01-01 RX ORDER — HYDROCHLOROTHIAZIDE 12.5 MG/1
12.5 CAPSULE ORAL DAILY
Status: DISCONTINUED | OUTPATIENT
Start: 2022-01-01 | End: 2022-01-01 | Stop reason: HOSPADM

## 2022-01-01 RX ORDER — ONDANSETRON 4 MG/1
4 TABLET, ORALLY DISINTEGRATING ORAL EVERY 6 HOURS PRN
Status: DISCONTINUED | OUTPATIENT
Start: 2022-01-01 | End: 2022-01-01 | Stop reason: HOSPADM

## 2022-01-01 RX ORDER — ONDANSETRON 2 MG/ML
4 INJECTION INTRAMUSCULAR; INTRAVENOUS EVERY 6 HOURS PRN
Status: DISCONTINUED | OUTPATIENT
Start: 2022-01-01 | End: 2022-01-01 | Stop reason: HOSPADM

## 2022-01-01 RX ORDER — LISINOPRIL 40 MG/1
40 TABLET ORAL EVERY EVENING
Status: DISCONTINUED | OUTPATIENT
Start: 2022-01-01 | End: 2022-01-01

## 2022-01-01 RX ORDER — LISINOPRIL 10 MG/1
10 TABLET ORAL EVERY EVENING
Status: DISCONTINUED | OUTPATIENT
Start: 2022-01-01 | End: 2022-01-01 | Stop reason: HOSPADM

## 2022-01-01 RX ORDER — METHYLPREDNISOLONE SODIUM SUCCINATE 40 MG/ML
40 INJECTION, POWDER, LYOPHILIZED, FOR SOLUTION INTRAMUSCULAR; INTRAVENOUS EVERY 12 HOURS
Status: DISCONTINUED | OUTPATIENT
Start: 2022-01-01 | End: 2022-01-01

## 2022-01-01 RX ORDER — IOPAMIDOL 755 MG/ML
75 INJECTION, SOLUTION INTRAVASCULAR ONCE
Status: COMPLETED | OUTPATIENT
Start: 2022-01-01 | End: 2022-01-01

## 2022-01-01 RX ORDER — METOPROLOL SUCCINATE 100 MG/1
1 TABLET, EXTENDED RELEASE ORAL DAILY
COMMUNITY
Start: 2022-01-01

## 2022-01-01 RX ORDER — ACETAMINOPHEN 325 MG/1
650 TABLET ORAL EVERY 6 HOURS PRN
Status: DISCONTINUED | OUTPATIENT
Start: 2022-01-01 | End: 2022-01-01 | Stop reason: HOSPADM

## 2022-01-01 RX ORDER — ALBUTEROL SULFATE 0.83 MG/ML
2.5 SOLUTION RESPIRATORY (INHALATION) EVERY 6 HOURS PRN
Status: DISCONTINUED | OUTPATIENT
Start: 2022-01-01 | End: 2022-01-01 | Stop reason: HOSPADM

## 2022-01-01 RX ORDER — GUAIFENESIN 600 MG/1
600 TABLET, EXTENDED RELEASE ORAL 2 TIMES DAILY
Status: DISCONTINUED | OUTPATIENT
Start: 2022-01-01 | End: 2022-01-01 | Stop reason: HOSPADM

## 2022-01-01 RX ORDER — ASPIRIN 325 MG
325 TABLET ORAL ONCE
Status: COMPLETED | OUTPATIENT
Start: 2022-01-01 | End: 2022-01-01

## 2022-01-01 RX ORDER — FUROSEMIDE 20 MG
20 TABLET ORAL DAILY
DISCHARGE
Start: 2022-01-01

## 2022-01-01 RX ADMIN — PREDNISONE 40 MG: 20 TABLET ORAL at 08:36

## 2022-01-01 RX ADMIN — Medication 3 MG: at 00:50

## 2022-01-01 RX ADMIN — OSELTAMIVIR PHOSPHATE 75 MG: 75 CAPSULE ORAL at 08:36

## 2022-01-01 RX ADMIN — AZITHROMYCIN MONOHYDRATE 500 MG: 500 INJECTION, POWDER, LYOPHILIZED, FOR SOLUTION INTRAVENOUS at 02:39

## 2022-01-01 RX ADMIN — GUAIFENESIN 600 MG: 600 TABLET, EXTENDED RELEASE ORAL at 08:02

## 2022-01-01 RX ADMIN — METOPROLOL TARTRATE 50 MG: 25 TABLET, FILM COATED ORAL at 08:50

## 2022-01-01 RX ADMIN — ATORVASTATIN CALCIUM 40 MG: 20 TABLET, FILM COATED ORAL at 08:57

## 2022-01-01 RX ADMIN — APIXABAN 5 MG: 5 TABLET, FILM COATED ORAL at 08:37

## 2022-01-01 RX ADMIN — OSELTAMIVIR PHOSPHATE 75 MG: 75 CAPSULE ORAL at 20:31

## 2022-01-01 RX ADMIN — OSELTAMIVIR PHOSPHATE 75 MG: 75 CAPSULE ORAL at 08:50

## 2022-01-01 RX ADMIN — METOPROLOL SUCCINATE 100 MG: 100 TABLET, EXTENDED RELEASE ORAL at 09:03

## 2022-01-01 RX ADMIN — TAMSULOSIN HYDROCHLORIDE 0.4 MG: 0.4 CAPSULE ORAL at 08:36

## 2022-01-01 RX ADMIN — ATORVASTATIN CALCIUM 10 MG: 10 TABLET, FILM COATED ORAL at 09:03

## 2022-01-01 RX ADMIN — AZITHROMYCIN MONOHYDRATE 500 MG: 500 INJECTION, POWDER, LYOPHILIZED, FOR SOLUTION INTRAVENOUS at 02:02

## 2022-01-01 RX ADMIN — VANCOMYCIN HYDROCHLORIDE 1500 MG: 10 INJECTION, POWDER, LYOPHILIZED, FOR SOLUTION INTRAVENOUS at 03:44

## 2022-01-01 RX ADMIN — LISINOPRIL 10 MG: 10 TABLET ORAL at 16:52

## 2022-01-01 RX ADMIN — OSELTAMIVIR PHOSPHATE 75 MG: 75 CAPSULE ORAL at 07:43

## 2022-01-01 RX ADMIN — IPRATROPIUM BROMIDE AND ALBUTEROL SULFATE 3 ML: 2.5; .5 SOLUTION RESPIRATORY (INHALATION) at 20:08

## 2022-01-01 RX ADMIN — SODIUM CHLORIDE 62 ML: 9 INJECTION, SOLUTION INTRAVENOUS at 10:08

## 2022-01-01 RX ADMIN — IPRATROPIUM BROMIDE AND ALBUTEROL SULFATE 3 ML: 2.5; .5 SOLUTION RESPIRATORY (INHALATION) at 12:54

## 2022-01-01 RX ADMIN — ASPIRIN 325 MG ORAL TABLET 325 MG: 325 PILL ORAL at 21:10

## 2022-01-01 RX ADMIN — ATORVASTATIN CALCIUM 10 MG: 10 TABLET, FILM COATED ORAL at 09:04

## 2022-01-01 RX ADMIN — HUMAN ALBUMIN MICROSPHERES AND PERFLUTREN 2 ML: 10; .22 INJECTION, SOLUTION INTRAVENOUS at 10:20

## 2022-01-01 RX ADMIN — ATORVASTATIN CALCIUM 10 MG: 10 TABLET, FILM COATED ORAL at 08:50

## 2022-01-01 RX ADMIN — METOPROLOL TARTRATE 25 MG: 25 TABLET, FILM COATED ORAL at 10:45

## 2022-01-01 RX ADMIN — OSELTAMIVIR PHOSPHATE 75 MG: 75 CAPSULE ORAL at 20:33

## 2022-01-01 RX ADMIN — IPRATROPIUM BROMIDE AND ALBUTEROL SULFATE 3 ML: 2.5; .5 SOLUTION RESPIRATORY (INHALATION) at 11:53

## 2022-01-01 RX ADMIN — ALBUTEROL SULFATE 2.5 MG: 2.5 SOLUTION RESPIRATORY (INHALATION) at 03:48

## 2022-01-01 RX ADMIN — APIXABAN 5 MG: 5 TABLET, FILM COATED ORAL at 08:50

## 2022-01-01 RX ADMIN — APIXABAN 5 MG: 5 TABLET, FILM COATED ORAL at 20:34

## 2022-01-01 RX ADMIN — PREDNISONE 40 MG: 20 TABLET ORAL at 08:02

## 2022-01-01 RX ADMIN — FERROUS SULFATE TAB 325 MG (65 MG ELEMENTAL FE) 325 MG: 325 (65 FE) TAB at 20:34

## 2022-01-01 RX ADMIN — SODIUM CHLORIDE 62 ML: 9 INJECTION, SOLUTION INTRAVENOUS at 09:36

## 2022-01-01 RX ADMIN — CEFTRIAXONE SODIUM 1 G: 1 INJECTION, POWDER, FOR SOLUTION INTRAMUSCULAR; INTRAVENOUS at 01:34

## 2022-01-01 RX ADMIN — IPRATROPIUM BROMIDE AND ALBUTEROL SULFATE 3 ML: 2.5; .5 SOLUTION RESPIRATORY (INHALATION) at 16:41

## 2022-01-01 RX ADMIN — SODIUM CHLORIDE: 9 INJECTION, SOLUTION INTRAVENOUS at 09:06

## 2022-01-01 RX ADMIN — IPRATROPIUM BROMIDE AND ALBUTEROL SULFATE 3 ML: 2.5; .5 SOLUTION RESPIRATORY (INHALATION) at 11:34

## 2022-01-01 RX ADMIN — GUAIFENESIN 600 MG: 600 TABLET, EXTENDED RELEASE ORAL at 19:44

## 2022-01-01 RX ADMIN — SODIUM CHLORIDE 100 ML: 9 INJECTION, SOLUTION INTRAVENOUS at 17:38

## 2022-01-01 RX ADMIN — LISINOPRIL 10 MG: 10 TABLET ORAL at 17:12

## 2022-01-01 RX ADMIN — METOPROLOL TARTRATE 50 MG: 25 TABLET, FILM COATED ORAL at 20:30

## 2022-01-01 RX ADMIN — BENZONATATE 100 MG: 100 CAPSULE ORAL at 02:00

## 2022-01-01 RX ADMIN — FERROUS SULFATE TAB 325 MG (65 MG ELEMENTAL FE) 325 MG: 325 (65 FE) TAB at 09:04

## 2022-01-01 RX ADMIN — CEFTRIAXONE SODIUM 1 G: 1 INJECTION, POWDER, FOR SOLUTION INTRAMUSCULAR; INTRAVENOUS at 01:37

## 2022-01-01 RX ADMIN — FUROSEMIDE 40 MG: 10 INJECTION, SOLUTION INTRAMUSCULAR; INTRAVENOUS at 20:30

## 2022-01-01 RX ADMIN — HUMAN ALBUMIN MICROSPHERES AND PERFLUTREN 2 ML: 10; .22 INJECTION, SOLUTION INTRAVENOUS at 14:45

## 2022-01-01 RX ADMIN — FERROUS SULFATE TAB 325 MG (65 MG ELEMENTAL FE) 325 MG: 325 (65 FE) TAB at 08:37

## 2022-01-01 RX ADMIN — APIXABAN 5 MG: 5 TABLET, FILM COATED ORAL at 20:30

## 2022-01-01 RX ADMIN — APIXABAN 5 MG: 5 TABLET, FILM COATED ORAL at 19:44

## 2022-01-01 RX ADMIN — TAMSULOSIN HYDROCHLORIDE 0.4 MG: 0.4 CAPSULE ORAL at 09:59

## 2022-01-01 RX ADMIN — APIXABAN 5 MG: 5 TABLET, FILM COATED ORAL at 08:02

## 2022-01-01 RX ADMIN — OSELTAMIVIR PHOSPHATE 75 MG: 75 CAPSULE ORAL at 08:03

## 2022-01-01 RX ADMIN — METOPROLOL TARTRATE 25 MG: 25 TABLET, FILM COATED ORAL at 08:02

## 2022-01-01 RX ADMIN — ATORVASTATIN CALCIUM 10 MG: 10 TABLET, FILM COATED ORAL at 08:01

## 2022-01-01 RX ADMIN — IPRATROPIUM BROMIDE AND ALBUTEROL SULFATE 3 ML: 2.5; .5 SOLUTION RESPIRATORY (INHALATION) at 07:30

## 2022-01-01 RX ADMIN — ACETAMINOPHEN 650 MG: 325 TABLET, FILM COATED ORAL at 01:51

## 2022-01-01 RX ADMIN — GUAIFENESIN 600 MG: 600 TABLET, EXTENDED RELEASE ORAL at 20:55

## 2022-01-01 RX ADMIN — APIXABAN 5 MG: 5 TABLET, FILM COATED ORAL at 18:51

## 2022-01-01 RX ADMIN — GUAIFENESIN 600 MG: 600 TABLET, EXTENDED RELEASE ORAL at 20:29

## 2022-01-01 RX ADMIN — IPRATROPIUM BROMIDE AND ALBUTEROL SULFATE 3 ML: 2.5; .5 SOLUTION RESPIRATORY (INHALATION) at 15:45

## 2022-01-01 RX ADMIN — OSELTAMIVIR PHOSPHATE 75 MG: 75 CAPSULE ORAL at 09:10

## 2022-01-01 RX ADMIN — FERROUS SULFATE TAB 325 MG (65 MG ELEMENTAL FE) 325 MG: 325 (65 FE) TAB at 18:52

## 2022-01-01 RX ADMIN — GUAIFENESIN 600 MG: 600 TABLET, EXTENDED RELEASE ORAL at 07:44

## 2022-01-01 RX ADMIN — FERROUS SULFATE TAB 325 MG (65 MG ELEMENTAL FE) 325 MG: 325 (65 FE) TAB at 07:46

## 2022-01-01 RX ADMIN — TAMSULOSIN HYDROCHLORIDE 0.4 MG: 0.4 CAPSULE ORAL at 07:46

## 2022-01-01 RX ADMIN — PREDNISONE 40 MG: 20 TABLET ORAL at 09:04

## 2022-01-01 RX ADMIN — ACETAMINOPHEN 650 MG: 325 TABLET, FILM COATED ORAL at 20:34

## 2022-01-01 RX ADMIN — CEFTRIAXONE SODIUM 1 G: 1 INJECTION, POWDER, FOR SOLUTION INTRAMUSCULAR; INTRAVENOUS at 01:56

## 2022-01-01 RX ADMIN — FERROUS SULFATE TAB 325 MG (65 MG ELEMENTAL FE) 325 MG: 325 (65 FE) TAB at 20:55

## 2022-01-01 RX ADMIN — FUROSEMIDE 20 MG: 10 INJECTION, SOLUTION INTRAMUSCULAR; INTRAVENOUS at 05:27

## 2022-01-01 RX ADMIN — IPRATROPIUM BROMIDE AND ALBUTEROL SULFATE 3 ML: 2.5; .5 SOLUTION RESPIRATORY (INHALATION) at 20:14

## 2022-01-01 RX ADMIN — FERROUS SULFATE TAB 325 MG (65 MG ELEMENTAL FE) 325 MG: 325 (65 FE) TAB at 20:30

## 2022-01-01 RX ADMIN — APIXABAN 5 MG: 5 TABLET, FILM COATED ORAL at 08:10

## 2022-01-01 RX ADMIN — FUROSEMIDE 20 MG: 20 TABLET ORAL at 09:17

## 2022-01-01 RX ADMIN — FERROUS SULFATE TAB 325 MG (65 MG ELEMENTAL FE) 325 MG: 325 (65 FE) TAB at 08:02

## 2022-01-01 RX ADMIN — APIXABAN 5 MG: 5 TABLET, FILM COATED ORAL at 22:09

## 2022-01-01 RX ADMIN — Medication 3 MG: at 01:59

## 2022-01-01 RX ADMIN — FUROSEMIDE 40 MG: 10 INJECTION, SOLUTION INTRAMUSCULAR; INTRAVENOUS at 06:37

## 2022-01-01 RX ADMIN — METHYLPREDNISOLONE SODIUM SUCCINATE 40 MG: 40 INJECTION, POWDER, FOR SOLUTION INTRAMUSCULAR; INTRAVENOUS at 20:55

## 2022-01-01 RX ADMIN — POTASSIUM CHLORIDE 20 MEQ: 1500 TABLET, EXTENDED RELEASE ORAL at 08:36

## 2022-01-01 RX ADMIN — IOPAMIDOL 78 ML: 755 INJECTION, SOLUTION INTRAVENOUS at 09:35

## 2022-01-01 RX ADMIN — DILTIAZEM HYDROCHLORIDE 20 MG: 5 INJECTION INTRAVENOUS at 06:53

## 2022-01-01 RX ADMIN — FUROSEMIDE 20 MG: 20 TABLET ORAL at 09:06

## 2022-01-01 RX ADMIN — TAMSULOSIN HYDROCHLORIDE 0.4 MG: 0.4 CAPSULE ORAL at 08:50

## 2022-01-01 RX ADMIN — FERROUS SULFATE TAB 325 MG (65 MG ELEMENTAL FE) 325 MG: 325 (65 FE) TAB at 20:16

## 2022-01-01 RX ADMIN — APIXABAN 5 MG: 5 TABLET, FILM COATED ORAL at 09:04

## 2022-01-01 RX ADMIN — ACETAMINOPHEN 650 MG: 325 TABLET, FILM COATED ORAL at 08:00

## 2022-01-01 RX ADMIN — IPRATROPIUM BROMIDE AND ALBUTEROL SULFATE 3 ML: 2.5; .5 SOLUTION RESPIRATORY (INHALATION) at 19:42

## 2022-01-01 RX ADMIN — GUAIFENESIN 600 MG: 600 TABLET, EXTENDED RELEASE ORAL at 09:05

## 2022-01-01 RX ADMIN — GUAIFENESIN 600 MG: 600 TABLET, EXTENDED RELEASE ORAL at 08:36

## 2022-01-01 RX ADMIN — IPRATROPIUM BROMIDE AND ALBUTEROL SULFATE 3 ML: 2.5; .5 SOLUTION RESPIRATORY (INHALATION) at 07:53

## 2022-01-01 RX ADMIN — ATORVASTATIN CALCIUM 10 MG: 10 TABLET, FILM COATED ORAL at 08:37

## 2022-01-01 RX ADMIN — FUROSEMIDE 20 MG: 20 TABLET ORAL at 08:36

## 2022-01-01 RX ADMIN — AZITHROMYCIN MONOHYDRATE 500 MG: 500 INJECTION, POWDER, LYOPHILIZED, FOR SOLUTION INTRAVENOUS at 02:38

## 2022-01-01 RX ADMIN — METOPROLOL TARTRATE 50 MG: 25 TABLET, FILM COATED ORAL at 07:46

## 2022-01-01 RX ADMIN — METOPROLOL TARTRATE 50 MG: 25 TABLET, FILM COATED ORAL at 18:54

## 2022-01-01 RX ADMIN — METHYLPREDNISOLONE SODIUM SUCCINATE 40 MG: 40 INJECTION, POWDER, FOR SOLUTION INTRAMUSCULAR; INTRAVENOUS at 07:46

## 2022-01-01 RX ADMIN — APIXABAN 5 MG: 5 TABLET, FILM COATED ORAL at 09:03

## 2022-01-01 RX ADMIN — GUAIFENESIN 600 MG: 600 TABLET, EXTENDED RELEASE ORAL at 09:03

## 2022-01-01 RX ADMIN — IPRATROPIUM BROMIDE AND ALBUTEROL SULFATE 3 ML: 2.5; .5 SOLUTION RESPIRATORY (INHALATION) at 16:31

## 2022-01-01 RX ADMIN — FERROUS SULFATE TAB 325 MG (65 MG ELEMENTAL FE) 325 MG: 325 (65 FE) TAB at 19:44

## 2022-01-01 RX ADMIN — IOPAMIDOL 86 ML: 755 INJECTION, SOLUTION INTRAVENOUS at 10:08

## 2022-01-01 RX ADMIN — ATORVASTATIN CALCIUM 10 MG: 10 TABLET, FILM COATED ORAL at 07:46

## 2022-01-01 RX ADMIN — METOPROLOL SUCCINATE 100 MG: 100 TABLET, EXTENDED RELEASE ORAL at 08:36

## 2022-01-01 RX ADMIN — GUAIFENESIN 600 MG: 600 TABLET, EXTENDED RELEASE ORAL at 08:50

## 2022-01-01 RX ADMIN — METHYLPREDNISOLONE SODIUM SUCCINATE 40 MG: 40 INJECTION, POWDER, FOR SOLUTION INTRAMUSCULAR; INTRAVENOUS at 08:47

## 2022-01-01 RX ADMIN — ALBUTEROL SULFATE 2.5 MG: 2.5 SOLUTION RESPIRATORY (INHALATION) at 01:45

## 2022-01-01 RX ADMIN — SODIUM CHLORIDE: 9 INJECTION, SOLUTION INTRAVENOUS at 11:53

## 2022-01-01 RX ADMIN — ATORVASTATIN CALCIUM 10 MG: 10 TABLET, FILM COATED ORAL at 08:02

## 2022-01-01 RX ADMIN — FUROSEMIDE 20 MG: 20 TABLET ORAL at 08:50

## 2022-01-01 RX ADMIN — FERROUS SULFATE TAB 325 MG (65 MG ELEMENTAL FE) 325 MG: 325 (65 FE) TAB at 08:10

## 2022-01-01 RX ADMIN — TAMSULOSIN HYDROCHLORIDE 0.4 MG: 0.4 CAPSULE ORAL at 09:03

## 2022-01-01 RX ADMIN — OSELTAMIVIR PHOSPHATE 75 MG: 75 CAPSULE ORAL at 18:52

## 2022-01-01 RX ADMIN — METOPROLOL TARTRATE 50 MG: 25 TABLET, FILM COATED ORAL at 20:55

## 2022-01-01 RX ADMIN — TAMSULOSIN HYDROCHLORIDE 0.4 MG: 0.4 CAPSULE ORAL at 08:01

## 2022-01-01 RX ADMIN — FERROUS SULFATE TAB 325 MG (65 MG ELEMENTAL FE) 325 MG: 325 (65 FE) TAB at 09:03

## 2022-01-01 RX ADMIN — IOPAMIDOL 75 ML: 755 INJECTION, SOLUTION INTRAVENOUS at 17:37

## 2022-01-01 RX ADMIN — ACETAMINOPHEN 650 MG: 325 TABLET, FILM COATED ORAL at 02:00

## 2022-01-01 RX ADMIN — OSELTAMIVIR PHOSPHATE 75 MG: 75 CAPSULE ORAL at 20:55

## 2022-01-01 RX ADMIN — AZITHROMYCIN MONOHYDRATE 500 MG: 500 INJECTION, POWDER, LYOPHILIZED, FOR SOLUTION INTRAVENOUS at 01:32

## 2022-01-01 RX ADMIN — GUAIFENESIN 600 MG: 600 TABLET, EXTENDED RELEASE ORAL at 20:34

## 2022-01-01 RX ADMIN — CEFTRIAXONE SODIUM 1 G: 1 INJECTION, POWDER, FOR SOLUTION INTRAMUSCULAR; INTRAVENOUS at 02:11

## 2022-01-01 RX ADMIN — GUAIFENESIN 600 MG: 600 TABLET, EXTENDED RELEASE ORAL at 20:16

## 2022-01-01 RX ADMIN — PREDNISONE 40 MG: 20 TABLET ORAL at 09:03

## 2022-01-01 RX ADMIN — GADOBUTROL 8 ML: 604.72 INJECTION INTRAVENOUS at 20:23

## 2022-01-01 RX ADMIN — IPRATROPIUM BROMIDE AND ALBUTEROL SULFATE 3 ML: 2.5; .5 SOLUTION RESPIRATORY (INHALATION) at 16:26

## 2022-01-01 RX ADMIN — CEFTRIAXONE SODIUM 1 G: 1 INJECTION, POWDER, FOR SOLUTION INTRAMUSCULAR; INTRAVENOUS at 00:21

## 2022-01-01 RX ADMIN — APIXABAN 5 MG: 5 TABLET, FILM COATED ORAL at 19:45

## 2022-01-01 RX ADMIN — FERROUS SULFATE TAB 325 MG (65 MG ELEMENTAL FE) 325 MG: 325 (65 FE) TAB at 08:50

## 2022-01-01 RX ADMIN — OSELTAMIVIR PHOSPHATE 75 MG: 75 CAPSULE ORAL at 19:44

## 2022-01-01 RX ADMIN — APIXABAN 5 MG: 5 TABLET, FILM COATED ORAL at 20:16

## 2022-01-01 RX ADMIN — GUAIFENESIN 600 MG: 600 TABLET, EXTENDED RELEASE ORAL at 18:52

## 2022-01-01 RX ADMIN — APIXABAN 5 MG: 5 TABLET, FILM COATED ORAL at 20:55

## 2022-01-01 RX ADMIN — FERROUS SULFATE TAB 325 MG (65 MG ELEMENTAL FE) 325 MG: 325 (65 FE) TAB at 08:01

## 2022-01-01 RX ADMIN — AZITHROMYCIN MONOHYDRATE 500 MG: 500 INJECTION, POWDER, LYOPHILIZED, FOR SOLUTION INTRAVENOUS at 03:20

## 2022-01-01 RX ADMIN — METHYLPREDNISOLONE SODIUM SUCCINATE 40 MG: 40 INJECTION, POWDER, FOR SOLUTION INTRAMUSCULAR; INTRAVENOUS at 20:30

## 2022-01-01 RX ADMIN — GUAIFENESIN 600 MG: 600 TABLET, EXTENDED RELEASE ORAL at 10:28

## 2022-01-01 RX ADMIN — ATORVASTATIN CALCIUM 10 MG: 10 TABLET, FILM COATED ORAL at 08:10

## 2022-01-01 RX ADMIN — IPRATROPIUM BROMIDE AND ALBUTEROL SULFATE 3 ML: 2.5; .5 SOLUTION RESPIRATORY (INHALATION) at 11:56

## 2022-01-01 RX ADMIN — FERROUS SULFATE TAB 325 MG (65 MG ELEMENTAL FE) 325 MG: 325 (65 FE) TAB at 22:09

## 2022-01-01 RX ADMIN — TAMSULOSIN HYDROCHLORIDE 0.4 MG: 0.4 CAPSULE ORAL at 08:02

## 2022-01-01 RX ADMIN — APIXABAN 5 MG: 5 TABLET, FILM COATED ORAL at 08:01

## 2022-01-01 RX ADMIN — TAMSULOSIN HYDROCHLORIDE 0.4 MG: 0.4 CAPSULE ORAL at 09:04

## 2022-01-01 RX ADMIN — APIXABAN 5 MG: 5 TABLET, FILM COATED ORAL at 07:46

## 2022-01-01 RX ADMIN — IPRATROPIUM BROMIDE AND ALBUTEROL SULFATE 3 ML: 2.5; .5 SOLUTION RESPIRATORY (INHALATION) at 07:49

## 2022-01-01 RX ADMIN — Medication 5 MG/HR: at 06:52

## 2022-01-01 RX ADMIN — IPRATROPIUM BROMIDE AND ALBUTEROL SULFATE 3 ML: 2.5; .5 SOLUTION RESPIRATORY (INHALATION) at 20:41

## 2022-01-01 ASSESSMENT — ACTIVITIES OF DAILY LIVING (ADL)
ADLS_ACUITY_SCORE: 29
ADLS_ACUITY_SCORE: 31
ADLS_ACUITY_SCORE: 25
ADLS_ACUITY_SCORE: 29
ADLS_ACUITY_SCORE: 24
ADLS_ACUITY_SCORE: 30
ADLS_ACUITY_SCORE: 31
ADLS_ACUITY_SCORE: 25
ADLS_ACUITY_SCORE: 27
ADLS_ACUITY_SCORE: 25
ADLS_ACUITY_SCORE: 31
ADLS_ACUITY_SCORE: 30
ADLS_ACUITY_SCORE: 25
ADLS_ACUITY_SCORE: 30
ADLS_ACUITY_SCORE: 31
ADLS_ACUITY_SCORE: 27
ADLS_ACUITY_SCORE: 21
ADLS_ACUITY_SCORE: 25
ADLS_ACUITY_SCORE: 24
ADLS_ACUITY_SCORE: 30
ADLS_ACUITY_SCORE: 31
ADLS_ACUITY_SCORE: 27
ADLS_ACUITY_SCORE: 25
ADLS_ACUITY_SCORE: 21
ADLS_ACUITY_SCORE: 25
ADLS_ACUITY_SCORE: 29
ADLS_ACUITY_SCORE: 24
ADLS_ACUITY_SCORE: 25
WALKING_OR_CLIMBING_STAIRS_DIFFICULTY: NO
ADLS_ACUITY_SCORE: 21
ADLS_ACUITY_SCORE: 29
DEPENDENT_IADLS:: INDEPENDENT
ADLS_ACUITY_SCORE: 30
ADLS_ACUITY_SCORE: 25
ADLS_ACUITY_SCORE: 35
ADLS_ACUITY_SCORE: 29
ADLS_ACUITY_SCORE: 25
ADLS_ACUITY_SCORE: 31
TOILETING_ISSUES: NO
ADLS_ACUITY_SCORE: 29
ADLS_ACUITY_SCORE: 21
CONCENTRATING,_REMEMBERING_OR_MAKING_DECISIONS_DIFFICULTY: YES
ADLS_ACUITY_SCORE: 30
ADLS_ACUITY_SCORE: 25
ADLS_ACUITY_SCORE: 35
ADLS_ACUITY_SCORE: 25
ADLS_ACUITY_SCORE: 21
ADLS_ACUITY_SCORE: 21
ADLS_ACUITY_SCORE: 29
ADLS_ACUITY_SCORE: 29
ADLS_ACUITY_SCORE: 31
ADLS_ACUITY_SCORE: 24
ADLS_ACUITY_SCORE: 25
ADLS_ACUITY_SCORE: 29
ADLS_ACUITY_SCORE: 24
ADLS_ACUITY_SCORE: 29
ADLS_ACUITY_SCORE: 21
ADLS_ACUITY_SCORE: 31
ADLS_ACUITY_SCORE: 29
DOING_ERRANDS_INDEPENDENTLY_DIFFICULTY: YES
ADLS_ACUITY_SCORE: 30
ADLS_ACUITY_SCORE: 29
ADLS_ACUITY_SCORE: 29
FALL_HISTORY_WITHIN_LAST_SIX_MONTHS: YES
ADLS_ACUITY_SCORE: 31
CHANGE_IN_FUNCTIONAL_STATUS_SINCE_ONSET_OF_CURRENT_ILLNESS/INJURY: NO
NUMBER_OF_TIMES_PATIENT_HAS_FALLEN_WITHIN_LAST_SIX_MONTHS: 1
ADLS_ACUITY_SCORE: 24
WEAR_GLASSES_OR_BLIND: NO
ADLS_ACUITY_SCORE: 25
EQUIPMENT_CURRENTLY_USED_AT_HOME: OTHER (SEE COMMENTS)
ADLS_ACUITY_SCORE: 35
ADLS_ACUITY_SCORE: 27
ADLS_ACUITY_SCORE: 30
ADLS_ACUITY_SCORE: 25
ADLS_ACUITY_SCORE: 29
ADLS_ACUITY_SCORE: 25
ADLS_ACUITY_SCORE: 31
ADLS_ACUITY_SCORE: 29
ADLS_ACUITY_SCORE: 25
ADLS_ACUITY_SCORE: 27
ADLS_ACUITY_SCORE: 29
ADLS_ACUITY_SCORE: 29
ADLS_ACUITY_SCORE: 25
ADLS_ACUITY_SCORE: 24
ADLS_ACUITY_SCORE: 31
ADLS_ACUITY_SCORE: 25
ADLS_ACUITY_SCORE: 21
ADLS_ACUITY_SCORE: 35
ADLS_ACUITY_SCORE: 29
ADLS_ACUITY_SCORE: 21
ADLS_ACUITY_SCORE: 24
ADLS_ACUITY_SCORE: 27
ADLS_ACUITY_SCORE: 29
ADLS_ACUITY_SCORE: 25
ADLS_ACUITY_SCORE: 29
ADLS_ACUITY_SCORE: 29
ADLS_ACUITY_SCORE: 27
ADLS_ACUITY_SCORE: 25
ADLS_ACUITY_SCORE: 21
ADLS_ACUITY_SCORE: 30
ADLS_ACUITY_SCORE: 21
ADLS_ACUITY_SCORE: 25
ADLS_ACUITY_SCORE: 30
DRESSING/BATHING_DIFFICULTY: NO
ADLS_ACUITY_SCORE: 25
DIFFICULTY_EATING/SWALLOWING: NO
ADLS_ACUITY_SCORE: 35
ADLS_ACUITY_SCORE: 25
ADLS_ACUITY_SCORE: 31
ADLS_ACUITY_SCORE: 29
ADLS_ACUITY_SCORE: 31
ADLS_ACUITY_SCORE: 25
ADLS_ACUITY_SCORE: 29
ADLS_ACUITY_SCORE: 30
ADLS_ACUITY_SCORE: 21
ADLS_ACUITY_SCORE: 29

## 2022-01-01 ASSESSMENT — ENCOUNTER SYMPTOMS
WHEEZING: 0
WOUND: 0
FEVER: 0
SORE THROAT: 0
ABDOMINAL PAIN: 0
PALPITATIONS: 0
COLOR CHANGE: 0
VOMITING: 0
SHORTNESS OF BREATH: 0
NUMBNESS: 0
DIARRHEA: 0
WEAKNESS: 0
COUGH: 0
NAUSEA: 0
CHILLS: 0
PHOTOPHOBIA: 0

## 2022-10-10 NOTE — ED PROVIDER NOTES
"  History     Chief Complaint   Patient presents with     Leg Pain     HPI  Leon Alvarado is a 76 year old male who presents to the emergency department with concern over right thigh pain which been present for approximate last 4 days without instigating injury or trauma.  Patient complains of intense pain when he attempts to ambulate or get in and out of his vehicle.  He is unaware of any recent trauma to the area.  No recent falls, slips or near falls.  No associated swelling.  No overlying erythema ecchymosis rash or skin changes.  No distal numbness or paresthesias.  He denies any fever, chills, myalgias, cough, chest pain, dyspnea, wheezing, nausea, vomiting, abdominal pain.  He has not had any history of similar symptoms previously.  He has attempted to treat with tylenol without relief.  Last dose was yesterday.      Allergies:  No Known Allergies    Problem List:    There are no problems to display for this patient.     Past Medical History:    No past medical history on file.    Past Surgical History:    No past surgical history on file.    Family History:    No family history on file.    Social History:  Marital Status:        Medications:    No current outpatient medications on file.    Review of Systems   Constitutional: Negative for chills and fever.   HENT: Negative for congestion, ear pain and sore throat.    Eyes: Negative for photophobia and visual disturbance.   Respiratory: Negative for cough, shortness of breath and wheezing.    Cardiovascular: Negative for chest pain and palpitations.   Gastrointestinal: Negative for abdominal pain, diarrhea, nausea and vomiting.   Musculoskeletal:        Right thigh pain   Skin: Negative for color change, rash and wound.   Neurological: Negative for weakness and numbness.     Physical Exam   BP: 137/62  Pulse: 68  Temp: 98.2  F (36.8  C)  Resp: 16  Height: 167.6 cm (5' 6\")  Weight: 79.4 kg (175 lb) (stated)  SpO2: 98 %  Physical Exam  Constitutional:       " General: He is not in acute distress.     Appearance: He is not ill-appearing or toxic-appearing.   HENT:      Head: Normocephalic and atraumatic.   Cardiovascular:      Rate and Rhythm: Normal rate and regular rhythm.      Pulses:           Dorsalis pedis pulses are 1+ on the right side and 1+ on the left side.        Posterior tibial pulses are 1+ on the right side and 1+ on the left side.      Heart sounds: Murmur heard.   Pulmonary:      Effort: Pulmonary effort is normal. No respiratory distress.      Breath sounds: Normal breath sounds. No wheezing, rhonchi or rales.   Abdominal:      General: Abdomen is flat. There is no distension.      Palpations: Abdomen is soft.      Tenderness: There is no abdominal tenderness. There is no right CVA tenderness, left CVA tenderness or guarding.   Musculoskeletal:      Lumbar back: No swelling, deformity, lacerations, tenderness or bony tenderness. Decreased range of motion. Negative right straight leg raise test and negative left straight leg raise test.      Right hip: Tenderness present. No deformity, lacerations or crepitus. Decreased range of motion. Decreased strength.      Right upper leg: Tenderness present. No swelling, edema, deformity, lacerations or bony tenderness.      Right knee: No swelling, deformity, effusion, erythema, ecchymosis, lacerations, bony tenderness or crepitus. Decreased range of motion (secondary to thigh pain).      Right lower leg: Normal.   Skin:     General: Skin is warm and dry.      Capillary Refill: Capillary refill takes less than 2 seconds.      Findings: No abrasion, ecchymosis, erythema, laceration or rash.   Neurological:      Mental Status: He is alert.      Sensory: No sensory deficit.      Deep Tendon Reflexes:      Reflex Scores:       Patellar reflexes are 2+ on the right side and 2+ on the left side.        ED Course           Procedures       Critical Care time:  none        Results for orders placed or performed during the  hospital encounter of 10/10/22 (from the past 24 hour(s))   XR Femur Right 2 Views    Narrative    FEMUR TWO VIEWS RIGHT  10/10/2022 12:23 PM     HISTORY: pain with ambulation  COMPARISON: None.      Impression    IMPRESSION: No acute fracture or malalignment. Minimal right hip joint  degenerative changes. Atherosclerosis.    JUHI NAVARRETE MD         SYSTEM ID:  [id number]   US Lower Extremity Venous Duplex Right    Narrative    US LOWER EXTREMITY VENOUS DUPLEX RIGHT 10/10/2022 1:23 PM    HISTORY: Right leg pain.    TECHNIQUE: Imaged deep venous structures of the right lower extremity  include the right common femoral vein, femoral vein, popliteal vein,  and visualized posterior deep calf veins.  Color flow and spectral  Doppler with waveform analysis performed.    COMPARISON: None.    FINDINGS: No DVT is demonstrated.      Impression    IMPRESSION: Negative.     DAREN FREY MD         SYSTEM ID:  [id number]     Medications - No data to display    Able to ambulate to the restroom with walker independently as visualized by ERT.    Assessments & Plan (with Medical Decision Making)     I have reviewed the nursing notes.    I have reviewed the findings, diagnosis, plan and need for follow up with the patient.       New Prescriptions    No medications on file     Final diagnoses:   Pain of right lower extremity     76-year-old male urgency department with concern over early history of atraumatic right thigh pain.  He had stable vital signs upon arrival.  Physical exam findings significant for decreased range of motion of the right hip, right knee and lumbar spine secondary to discomfort.  No swelling or edema.  Distal neurovascular status was intact.  He had x-ray of the femur which was negative for acute bony abnormality.  Ultrasound was negative for evidence of DVT.  Differential for symptoms would include meralgia paresthetica, radicular back pain, iliotibial band syndrome.  Patient was able to ambulate with  aid of walker.  He was discharged home stable with instructions for symptomatic treatment with ice/heat, tylenol, topical Voltaren/lidocaine.  Follow up with PCP later this week as previously scheduled. Worrisome reasons to return to ER/UC sooner discussed.     Disclaimer: This note consists of symbols derived from keyboarding, dictation, and/or voice recognition software. As a result, there may be errors in the script that have gone undetected.  Please consider this when interpreting information found in the chart.      10/10/2022   [facility] EMERGENCY DEPT     Ketty Martinez PA-C  10/14/22 2015

## 2022-10-10 NOTE — DISCHARGE INSTRUCTIONS
Symptomatic treatment as needed with over-the-counter Tylenol/topical lidocaine patches, ibuprofen as tolerated.

## 2022-11-07 PROBLEM — I63.9 ISCHEMIC STROKE (H): Status: ACTIVE | Noted: 2022-01-01

## 2022-11-07 NOTE — ED PROVIDER NOTES
History     Chief Complaint   Patient presents with     Stroke Symptoms     Altered Mental Status     HPI  Leon Alvarado is a 77 year old male who presents for stroke symptoms.  Initial history obtained from EMS.  They report that they were called for altered mental status that started several hours prior to their arrival and they were concerned for left-sided weakness and left facial droop.  Glucose normal.  The patient was activated for stroke prior to arrival and a level 1 stroke code was called when the patient's ambulance arrived due to that history and the patient was brought immediately for CT scan with me obtaining some basic history and exam while he was being transferred to the CT scanner.    The patient is not sure when this started, seems confused and is unable to give any consistent history.  He says maybe this started around noon but he is not sure what he was doing earlier today.    Later discussing the patient he denies headache, nausea, vomiting, chest pain, shortness of breath, cough, abdominal pain, diarrhea, or rash.    Allergies:  No Known Allergies    Problem List:    There are no problems to display for this patient.       Past Medical History:    No past medical history on file.    Past Surgical History:    No past surgical history on file.    Family History:    No family history on file.    Social History:  Marital Status:   [2]        Medications:    amLODIPine (NORVASC) 10 MG tablet  B Complex-C (SUPER B COMPLEX PO)  hydrochlorothiazide (HYDRODIURIL) 12.5 MG tablet  lisinopril (ZESTRIL) 40 MG tablet  metoprolol succinate ER (TOPROL XL) 100 MG 24 hr tablet  sildenafil (VIAGRA) 100 MG tablet  VITAMIN K PO          Review of Systems  Pertinent positives and negatives listed in the HPI, all other systems reviewed and are negative.    Physical Exam   BP: 117/65  Pulse: 89  Temp: 98.7  F (37.1  C)  Resp: 11  Weight: 79.4 kg (175 lb)  SpO2: 97 %      Physical Exam  Vitals and nursing  note reviewed.   Constitutional:       Appearance: He is well-developed and well-nourished. He is not diaphoretic.   HENT:      Head: Normocephalic and atraumatic.      Right Ear: External ear normal.      Left Ear: External ear normal.      Nose: Nose normal.   Eyes:      General: No scleral icterus.     Conjunctiva/sclera: Conjunctivae normal.   Cardiovascular:      Rate and Rhythm: Normal rate and regular rhythm.   Pulmonary:      Effort: Pulmonary effort is normal. No respiratory distress.      Breath sounds: No stridor.   Abdominal:      General: There is no distension.      Palpations: Abdomen is soft.      Tenderness: There is no abdominal tenderness. There is no guarding.   Musculoskeletal:      Cervical back: Normal range of motion.   Skin:     General: Skin is warm and dry.      Coloration: Skin is not cyanotic.   Neurological:      Mental Status: He is alert. He is confused.   Psychiatric:         Mood and Affect: Mood and affect normal.         Behavior: Behavior normal.         ED Course                 Procedures              EKG Interpretation:      Interpreted by Nikc Roldan MD  Time reviewed: 1756  Symptoms at time of EKG: AMS   Rhythm: normal sinus   Rate: normal  Axis: normal  Ectopy: none  Conduction: normal  ST Segments/ T Waves: No ST-T wave changes  Q Waves: none  Comparison to prior: No old EKG available    Clinical Impression: normal EKG    Critical Care time:  was 40 minutes for this patient excluding procedures.     The patient has stroke symptoms:         ED Stroke specific documentation           NIHSS PDF     Patient last known well time: Unclear, possibly noon, approximately 5 hours prior to arrival  ED Provider first to bedside at: upon arrival  CT Results received at: 5:47 PM CT of the head was reviewed by myself, no obvious intracranial hemorrhage    Thrombolytics:   Not given due to:   - unclear or unfavorable risk-benefit profile for extended window thrombolysis beyond  the conventional 4.5 hour time window    If treating with thrombolytics: Ensure SBP<180 and DBP<105 prior to treatment with thrombolytics.  Administering thrombolytics after treatment with IV labetalol, hydralazine, or nicardipine is reasonable once BP control is established.    Endovascular Retrieval:  Not initiated due to absence of proximal vessel occlusion    National [employer] Stroke Scale (Baseline)  Time Performed: 1750     Score    Level of consciousness: (0)   Alert, keenly responsive    LOC questions: (1)   Answers one question correctly    LOC commands: (0)   Performs both tasks correctly    Best gaze: (0)   Normal    Visual: (0)   No visual loss    Facial palsy: (0)   Normal symmetrical movements    Motor arm (left): (1)   Drift    Motor arm (right): (0)   No drift    Motor leg (left): (0)   No drift    Motor leg (right): (0)   No drift    Limb ataxia: (0)   Absent    Sensory: (0)   Normal- no sensory loss    Best language: (0)   Normal- no aphasia    Dysarthria: (0)   Normal    Extinction and inattention: (1)   Visual, tacile, auditory, spatial, person inattention        Total Score:  3        Stroke Mimics were considered (including migraine headache, seizure disorder, hypoglycemia (or hyperglycemia), head or spinal trauma, CNS infection, Toxin ingestion and shock state (e.g. sepsis) .           Results for orders placed or performed during the hospital encounter of 11/07/22 (from the past 24 hour(s))   Glucose by meter   Result Value Ref Range    GLUCOSE BY METER POCT 123 (H) 70 - 99 mg/dL   Head CT w/o contrast    Addendum: 11/7/2022    Findings of unenhanced CT were discussed over the phone with Dr. Roldan on 11/07/2022 at 1801 CST.          Narrative    EXAM: CTA HEAD NECK W CONTRAST, CT HEAD W/O CONTRAST  LOCATION: [facility]  DATE/TIME: 11/7/2022 5:42 PM    INDICATION: Left-sided weakness and left facial droop.  COMPARISON: None.  CONTRAST: 75 ml Isovue  370  TECHNIQUE: Head and neck CT angiogram with IV contrast. Noncontrast head CT followed by axial helical CT images of the head and neck vessels obtained during the arterial phase of intravenous contrast administration. Axial 2D reconstructed images and   multiplanar 3D MIP reconstructed images of the head and neck vessels were performed by the technologist. Dose reduction techniques were used. All stenosis measurements made according to NASCET criteria unless otherwise specified.    FINDINGS:   NONCONTRAST HEAD CT:   INTRACRANIAL CONTENTS: No acute intracranial hemorrhage, extraaxial collection, or mass effect.  Scattered hypodensities within the bilateral periventricular, deep, and subcortical cerebral white matter, nonspecific although may be related to the chronic   microvascular ischemia. These limits the assessment for a small acute infarct. No evidence of an acute large vascular distribution transcortical infarct. Encephalomalacia within the right middle frontal gyrus, consistent with a chronic infarct. Multiple   bilateral punctate gyral calcifications. Mild generalized parenchymal volume loss. No acute hydrocephalus.     VISUALIZED ORBITS/SINUSES/MASTOIDS: No acute intraorbital abnormality. No significant paranasal sinus or mastoid mucosal disease.     BONES/SOFT TISSUES: No acute abnormality. Degenerative changes of the left temporomandibular joint.    HEAD CTA:  ANTERIOR CIRCULATION: No high-grade stenosis/occlusion, aneurysm, or high flow vascular malformation. A 2 mm right posterior communicating artery origin outpouching, likely an infundibulum. Standard [place] anatomy.    POSTERIOR CIRCULATION: No high-grade stenosis/occlusion, aneurysm, or high flow vascular malformation. Dominant left and smaller right vertebral artery contribute to a normal basilar artery.     DURAL VENOUS SINUSES: Expected enhancement of the major dural venous sinuses.    NECK CTA:  RIGHT CAROTID: No measurable stenosis  or dissection.    LEFT CAROTID: No measurable stenosis or dissection.    VERTEBRAL ARTERIES: No focal high-grade stenosis or dissection. Balanced vertebral arteries.    AORTIC ARCH: Classic aortic arch anatomy with no significant stenosis at the origin of the great vessels.    NONVASCULAR STRUCTURES: A right upper lobe pulmonary cyst and a calcified granuloma.      Impression    IMPRESSION:   HEAD CT:  1.  No acute intracranial hemorrhage, extra-axial fluid collection, or mass effect.  2.  Bilateral cerebral white matter hypodensities. While they may represent the sequela of chronic microvascular ischemia, they remain age-indeterminate in the absence of prior exams for comparison and may mask a small acute infarct. An MRI of the brain   may be helpful for further evaluation, as clinically indicated.  3.  Chronic changes, as above.    HEAD CTA:   1.  No large vessel occlusion, high-grade stenosis, or aneurysm.    NECK CTA:  1.  No carotid or vertebral artery hemodynamically significant stenosis, dissection, or pseudoaneurysm.   CTA Head Neck with Contrast    Addendum: 11/7/2022    Findings of unenhanced CT were discussed over the phone with Dr. Roldan on 11/07/2022 at 1801 CST.          Narrative    EXAM: CTA HEAD NECK W CONTRAST, CT HEAD W/O CONTRAST  LOCATION: [facility]  DATE/TIME: 11/7/2022 5:42 PM    INDICATION: Left-sided weakness and left facial droop.  COMPARISON: None.  CONTRAST: 75 ml Isovue 370  TECHNIQUE: Head and neck CT angiogram with IV contrast. Noncontrast head CT followed by axial helical CT images of the head and neck vessels obtained during the arterial phase of intravenous contrast administration. Axial 2D reconstructed images and   multiplanar 3D MIP reconstructed images of the head and neck vessels were performed by the technologist. Dose reduction techniques were used. All stenosis measurements made according to NASCET criteria unless otherwise  specified.    FINDINGS:   NONCONTRAST HEAD CT:   INTRACRANIAL CONTENTS: No acute intracranial hemorrhage, extraaxial collection, or mass effect.  Scattered hypodensities within the bilateral periventricular, deep, and subcortical cerebral white matter, nonspecific although may be related to the chronic   microvascular ischemia. These limits the assessment for a small acute infarct. No evidence of an acute large vascular distribution transcortical infarct. Encephalomalacia within the right middle frontal gyrus, consistent with a chronic infarct. Multiple   bilateral punctate gyral calcifications. Mild generalized parenchymal volume loss. No acute hydrocephalus.     VISUALIZED ORBITS/SINUSES/MASTOIDS: No acute intraorbital abnormality. No significant paranasal sinus or mastoid mucosal disease.     BONES/SOFT TISSUES: No acute abnormality. Degenerative changes of the left temporomandibular joint.    HEAD CTA:  ANTERIOR CIRCULATION: No high-grade stenosis/occlusion, aneurysm, or high flow vascular malformation. A 2 mm right posterior communicating artery origin outpouching, likely an infundibulum. Standard [place] anatomy.    POSTERIOR CIRCULATION: No high-grade stenosis/occlusion, aneurysm, or high flow vascular malformation. Dominant left and smaller right vertebral artery contribute to a normal basilar artery.     DURAL VENOUS SINUSES: Expected enhancement of the major dural venous sinuses.    NECK CTA:  RIGHT CAROTID: No measurable stenosis or dissection.    LEFT CAROTID: No measurable stenosis or dissection.    VERTEBRAL ARTERIES: No focal high-grade stenosis or dissection. Balanced vertebral arteries.    AORTIC ARCH: Classic aortic arch anatomy with no significant stenosis at the origin of the great vessels.    NONVASCULAR STRUCTURES: A right upper lobe pulmonary cyst and a calcified granuloma.      Impression    IMPRESSION:   HEAD CT:  1.  No acute intracranial hemorrhage, extra-axial fluid collection,  or mass effect.  2.  Bilateral cerebral white matter hypodensities. While they may represent the sequela of chronic microvascular ischemia, they remain age-indeterminate in the absence of prior exams for comparison and may mask a small acute infarct. An MRI of the brain   may be helpful for further evaluation, as clinically indicated.  3.  Chronic changes, as above.    HEAD CTA:   1.  No large vessel occlusion, high-grade stenosis, or aneurysm.    NECK CTA:  1.  No carotid or vertebral artery hemodynamically significant stenosis, dissection, or pseudoaneurysm.   CBC with Platelets & Differential    Narrative    The following orders were created for panel order CBC with Platelets & Differential.  Procedure                               Abnormality         Status                     ---------                               -----------         ------                     CBC with platelets and d...[[id number]]  Abnormal            Final result                 Please view results for these tests on the individual orders.   Basic metabolic panel   Result Value Ref Range    Sodium 135 (L) 136 - 145 mmol/L    Potassium 3.9 3.4 - 5.3 mmol/L    Chloride 102 98 - 107 mmol/L    Carbon Dioxide (CO2) 22 22 - 29 mmol/L    Anion Gap 11 7 - 15 mmol/L    Urea Nitrogen 21.4 8.0 - 23.0 mg/dL    Creatinine 1.02 0.67 - 1.17 mg/dL    Calcium 7.9 (L) 8.8 - 10.2 mg/dL    Glucose 128 (H) 70 - 99 mg/dL    GFR Estimate 75 >60 mL/min/1.73m2   INR   Result Value Ref Range    INR 1.33 (H) 0.85 - 1.15   Partial thromboplastin time   Result Value Ref Range    aPTT 31 22 - 38 Seconds   Troponin T, High Sensitivity   Result Value Ref Range    Troponin T, High Sensitivity 35 (H) <=22 ng/L   CBC with platelets and differential   Result Value Ref Range    WBC Count 17.4 (H) 4.0 - 11.0 10e3/uL    RBC Count 3.13 (L) 4.40 - 5.90 10e6/uL    Hemoglobin 10.0 (L) 13.3 - 17.7 g/dL    Hematocrit 29.5 (L) 40.0 - 53.0 %    MCV 94 78 - 100 fL    MCH 31.9 26.5 - 33.0  pg    MCHC 33.9 31.5 - 36.5 g/dL    RDW 13.2 10.0 - 15.0 %    Platelet Count 165 150 - 450 10e3/uL    % Neutrophils 88 %    % Lymphocytes 3 %    % Monocytes 9 %    % Eosinophils 0 %    % Basophils 0 %    % Immature Granulocytes 0 %    NRBCs per 100 WBC 0 <1 /100    Absolute Neutrophils 15.1 (H) 1.6 - 8.3 10e3/uL    Absolute Lymphocytes 0.6 (L) 0.8 - 5.3 10e3/uL    Absolute Monocytes 1.6 (H) 0.0 - 1.3 10e3/uL    Absolute Eosinophils 0.0 0.0 - 0.7 10e3/uL    Absolute Basophils 0.0 0.0 - 0.2 10e3/uL    Absolute Immature Granulocytes 0.1 <=0.4 10e3/uL    Absolute NRBCs 0.0 10e3/uL   Hepatic panel   Result Value Ref Range    Protein Total 5.8 (L) 6.4 - 8.3 g/dL    Albumin 3.2 (L) 3.5 - 5.2 g/dL    Bilirubin Total 1.1 <=1.2 mg/dL    Alkaline Phosphatase 72 40 - 129 U/L    AST 27 10 - 50 U/L    ALT 16 10 - 50 U/L    Bilirubin Direct 0.32 (H) 0.00 - 0.30 mg/dL   Alcohol level blood   Result Value Ref Range    Alcohol ethyl <0.01 (H) <=0.00 g/dL   Ammonia (on ice)   Result Value Ref Range    Ammonia 13 (L) 16 - 60 umol/L   Troponin T, High Sensitivity   Result Value Ref Range    Troponin T, High Sensitivity 36 (H) <=22 ng/L   XR Abdomen 1 View    Narrative    EXAM: XR ABDOMEN 1 VIEW  LOCATION: [facility]  DATE/TIME: 11/7/2022 7:27 PM    INDICATION: encephalopathy Pre MRI screening  COMPARISON: None.      Impression    IMPRESSION: Negative abdomen. Bowel gas pattern is normal. Nothing for obstruction or free air. Contrast present throughout the urinary collecting system. No metallic foreign body identified.   XR Chest 1 View    Narrative    EXAM: XR CHEST 1 VIEW  LOCATION: [facility]  DATE/TIME: 11/7/2022 7:28 PM    INDICATION: encephalopathy  COMPARISON: None.      Impression    IMPRESSION: Low lung volumes. No definite focal pneumonia. Heart size likely upper limits of normal with heavy calcification of mitral annulus. Mild central hilar congestion may relate to the  low lung volumes.  No metallic foreign body.   MR Brain w/o & w Contrast   Result Value Ref Range    Radiologist flags Acute infarcts (AA)     Narrative    EXAM: MR BRAIN W/O and W CONTRAST  LOCATION: [facility]  DATE/TIME: 11/7/2022 8:24 PM    INDICATION: Encephalopathy  COMPARISON:  Earlier same day CTs.  CONTRAST: Gadavist 8 mL  TECHNIQUE: Routine multiplanar multisequence head MRI without and with intravenous contrast.    FINDINGS:  INTRACRANIAL CONTENTS: Numerous punctate scattered bilateral supratentorial and infratentorial acute infarcts, specifically involving the right hippocampus (series 3.2, image 13). No mass, acute hemorrhage, or extra-axial fluid collections. Scattered   nonspecific T2/FLAIR hyperintensities within the cerebral white matter most consistent with mild chronic microvascular ischemic change. Mild generalized cerebral atrophy. No hydrocephalus. Normal position of the cerebellar tonsils. No pathologic contrast   enhancement.    SELLA: No abnormality accounting for technique.    OSSEOUS STRUCTURES/SOFT TISSUES: Normal marrow signal. The major intracranial vascular flow voids are maintained.     ORBITS: No abnormality accounting for technique.     SINUSES/MASTOIDS: No paranasal sinus mucosal disease. No middle ear or mastoid effusion.       Impression    IMPRESSION:  1.  Numerous punctate scattered supratentorial and infratentorial acute infarcts, specifically involving the right hippocampus. This distribution is suspicious for a thromboembolic etiology. No significant mass effect or hemorrhagic conversion.       [Critical Result: Acute infarcts]    Finding was identified on 11/7/2022 8:24 PM.     Dr. Nick Roldan was contacted by me on 11/7/2022 8:33 PM and verbalized understanding of the critical result.        Medications   amLODIPine (NORVASC) tablet 10 mg (has no administration in time range)   hydrochlorothiazide (MICROZIDE) capsule 12.5 mg (has no  administration in time range)   lisinopril (ZESTRIL) tablet 40 mg (has no administration in time range)   metoprolol succinate ER (TOPROL XL) 24 hr tablet 100 mg (has no administration in time range)   iopamidol (ISOVUE-370) solution 75 mL (75 mLs Intravenous Given 11/7/22 1737)   sodium chloride 0.9 % bag 500mL for CT scan flush use (100 mLs As instructed Given 11/7/22 1738)   gadobutrol (GADAVIST) injection 8 mL (8 mLs Intravenous Given 11/7/22 2023)   aspirin (ASA) tablet 325 mg (325 mg Oral Given 11/7/22 2110)       Assessments & Plan (with Medical Decision Making)   77-year-old male who presents for evaluation of possible stroke symptoms.  Stroke code activated upon arrival before I fully evaluate the patient.  After my a full evaluation I am less concerned for possible stroke and more for encephalopathy.  He has some mild weakness on the left but mostly he is confused.  He does have a history of heavy alcohol use in the past, he denies drinking currently.  I will start the patient on high-dose IV thiamine in case this is related to alcohol use.  CT of the head and neck are negative for signs or signs of intracranial hemorrhage, mass-effect, or definite acute stroke.  I spoke with the on-call radiologist and he recommended an MRI of the brain for further evaluation given the bilateral cerebral white matter changes.  His hemoglobin is mildly low at 10, with blood cell count of 17.4, unclear leukocytosis.  His troponin is mildly elevated at 35 for unclear reasons. Repeat troponin is stable.     MRI of the brain is obtained, images reviewed independently, I also spoke on the phone with the reading radiologist, this is concerning for multiple embolic appearing ischemic strokes concerning for some sort of focus that is showering his brain with these emboli.  I discussed this with the on-call stroke neurologist who recommends aspirin and echocardiogram in the morning and recheck with neurology at that time.  I have  ordered these and have ordered the patient's home medications.  Unfortunately there are no hospital beds available at this time the patient will be boarded in the emergency department overnight awaiting an appropriate hospital bed.    I have reviewed the nursing notes.    I have reviewed the findings, diagnosis, plan and need for follow up with the patient.       New Prescriptions    No medications on file       Final diagnoses:   Ischemic stroke (H)       11/7/2022   [facility] EMERGENCY DEPT     Nick Roldan MD  11/07/22 9534

## 2022-11-07 NOTE — CONSULTS
"          [facility]    Stroke Telephone Note    I was called by Nick Roldan on 11/07/22 regarding patient Leon Alvarado. The patient is a 77 year old male who presents with acute onset left sided arm with drift to the bed, facial droop.     LKN: 1200 he came home at 2pm and wife noticed that he was off at that time but she didn't speak with him.   He says he felt off around 1200.   PMhx of aortic valve stenosis  .    Stroke Code Data (for stroke code without tele)  Stroke code activated 11/07/22   1726   Stroke provider first response  11/07/22   1731            Last known normal 11/07/22        Unknown   Time of discovery   (or onset of symptoms) 11/07/22   1400   Head CT read by Stroke Neuro Dr/Provider 11/07/22   1741   Was stroke code de-escalated? Yes 11/07/22 1800          Imaging Findings   Head CT negative for acute stroke or hemorrhage, no large vessel occlusion on CTA per personal review         Intravenous Thrombolysis  Not given due to:   - unclear or unfavorable risk-benefit profile for extended window thrombolysis beyond the conventional 4.5 hour time window    Endovascular Treatment  Not initiated due to absence of proximal vessel occlusion    ImpressionSuspected Acute ischemic stroke of R MCA territory due to undetermined etiology- patient has an unclear LNK but symptoms have at least been present since 1200 as the patient states he started feeling off around then. His wife noticed he was acting abnormal around 2pm. Given the possible onset at noon patient is outside of the 4.5 hour window. HE is outside of the window so the risk of giving TNK outweighs the benefit.       Recommendations       MRI brain WWO   If positive for stroke then will need the following stroke work-up      - Use orderset: \"Ischemic Stroke Routine Admission\" or \"Ischemic Stroke No Thrombolytics/No Thrombectomy ICU Admission\"  - Neurochecks and Vital Signs every Q4H   - Permissive HTN; " "goal SBP < 220 mmHg  - Daily aspirin 81 mg for secondary stroke prevention  - Statin: Lipitor 40mg  - MRI Brain with and without contrast  - TTE (with Bubble Study if age 60 yrs or less)  - Telemetry, EKG  - Bedside Glucose Monitoring  - A1c, Lipid Panel, Troponin x 3  - PT/OT/SLP  - Stroke Education  - Euthermia, Euglycemia     - Additional antiplatelet pending on stroke       My recommendations are based on the information provided over the phone by Leon Alvarado's in-person providers. They are not intended to replace the clinical judgment of his in-person providers. I was not requested to personally see or examine the patient at this time.    The Stroke Staff is Dr. Pedraza.    Enid Thomas MD  Vascular Neurology Fellow  To page me or covering stroke neurology team member, click here: [facility]COM   Choose \"On Call\" tab at top, then search dropdown box for \"Neurology Adult\", select location, press Enter, then look for stroke/neuro ICU/telestroke.        "

## 2022-11-07 NOTE — LETTER
Transition Communication Hand-off for Care Transitions to Next Level of Care Provider    Name: Leon Alvarado  : 1945  MRN #: [id number]  Primary Care Provider: Saleem Jones     Primary Clinic: [facility] [address] [facility] [address]     Reason for Hospitalization:  Ischemic stroke (H) [I63.9]  Admit Date/Time: 2022  5:24 PM  Discharge Date: 22  Payor Source: Payor: MEDICARE / Plan: MEDICARE / Product Type: Medicare /            Reason for Communication Hand-off Referral:  Discharge to a TCU    Discharge Plan:  [facility] Phone (Main Phone:[phone] Admissions Phone:[phone] Fax: [phone])       Concern for non-adherence with plan of care:   Y/N no  Discharge Needs Assessment:  Needs    Flowsheet Row Most Recent Value   Equipment Currently Used at Home none   # of Referrals Placed by CTS External Care Coordination, Post Acute Facilities   PAS Number [id number]  [[id number]]          Follow-up plan:    Future Appointments   Date Time Provider Department Center   2022  9:30 AM Enid Ware PT [facility]                     Erica Armijo RN    AVS/Discharge Summary is the source of truth; this is a helpful guide for improved communication of patient story

## 2022-11-07 NOTE — ED TRIAGE NOTES
"Left-sided weakness and facial droop.  Patient came inside approximately 3 hours ago and wife stated that he was acting \"goofy\".  Patient stated that he didn't feel well since approximately 1200.     Triage Assessment     Row Name 11/07/22 3187       Triage Assessment (Adult)    Airway WDL WDL       Respiratory WDL    Respiratory WDL WDL       Skin Circulation/Temperature WDL    Skin Circulation/Temperature WDL WDL       Cardiac WDL    Cardiac WDL WDL       Peripheral/Neurovascular WDL    Peripheral Neurovascular WDL WDL       Cognitive/Neuro/Behavioral WDL    Cognitive/Neuro/Behavioral WDL X    Level of Consciousness confused    Orientation disoriented to;situation       Maya Coma Scale    Best Eye Response 4-->(E4) spontaneous    Best Motor Response 6-->(M6) obeys commands    Best Verbal Response 5-->(V5) oriented    Maya Coma Scale Score 15       Motor Response    LUE Motor Response no response to stimuli    LLE Motor Response no response to stimuli              "

## 2022-11-08 NOTE — ED NOTES
[facility]   Admission Handoff    The patient is Leon Alvarado, 77 year old who arrived in the ED by AMBULANCE from home with a complaint of Stroke Symptoms and Altered Mental Status  . The patient's current symptoms are new and during this time the symptoms have remained the same. In the ED, patient was diagnosed with   Final diagnoses:   Ischemic stroke (H)         Needed?: No    Allergies:  No Known Allergies    Past Medical Hx: No past medical history on file.    Initial vitals were: BP: 117/65  Pulse: 89  Temp: 98.7  F (37.1  C)  Resp: 11  Weight: 79.4 kg (175 lb)  SpO2: 97 %   Recent vital Signs: /63   Pulse 80   Temp 98.7  F (37.1  C) (Oral)   Resp 23   Wt 79.4 kg (175 lb)   SpO2 97%   BMI 28.25 kg/m      Elimination Status: Continent: wears briefs     Activity Level: 2 assist    Fall Status: Reason for falls risk:  Mobility and Reason for falls risk: Cognition  nonskid shoes/slippers when out of bed    Baseline Mental status: mild dementia  Current Mental Status changes: acute confusion and dementia    Infection present or suspected this encounter: no  Sepsis suspected: No    Isolation type: NA    Bariatric equipment needed?: No    In the ED these meds were given:   Medications   amLODIPine (NORVASC) tablet 10 mg (has no administration in time range)   hydrochlorothiazide (MICROZIDE) capsule 12.5 mg (has no administration in time range)   lisinopril (ZESTRIL) tablet 40 mg (has no administration in time range)   metoprolol succinate ER (TOPROL XL) 24 hr tablet 100 mg (has no administration in time range)   iopamidol (ISOVUE-370) solution 75 mL (75 mLs Intravenous Given 11/7/22 1737)   sodium chloride 0.9 % bag 500mL for CT scan flush use (100 mLs As instructed Given 11/7/22 1738)   gadobutrol (GADAVIST) injection 8 mL (8 mLs Intravenous Given 11/7/22 2023)   aspirin (ASA) tablet 325 mg (325 mg Oral Given 11/7/22 2110)       Drips running?  No    Home pump   No    Current LDAs: Peripheral IV: Site LAC; Gauge 18  none     Results:   Labs/Imaging  Ordered and Resulted from Time of ED Arrival Up to the Time of Departure from the ED  Results for orders placed or performed during the hospital encounter of 11/07/22 (from the past 24 hour(s))   Glucose by meter   Result Value Ref Range    GLUCOSE BY METER POCT 123 (H) 70 - 99 mg/dL   Head CT w/o contrast    Addendum: 11/7/2022    Findings of unenhanced CT were discussed over the phone with Dr. Roldan on 11/07/2022 at 1801 CST.          Narrative    EXAM: CTA HEAD NECK W CONTRAST, CT HEAD W/O CONTRAST  LOCATION: [facility]  DATE/TIME: 11/7/2022 5:42 PM    INDICATION: Left-sided weakness and left facial droop.  COMPARISON: None.  CONTRAST: 75 ml Isovue 370  TECHNIQUE: Head and neck CT angiogram with IV contrast. Noncontrast head CT followed by axial helical CT images of the head and neck vessels obtained during the arterial phase of intravenous contrast administration. Axial 2D reconstructed images and   multiplanar 3D MIP reconstructed images of the head and neck vessels were performed by the technologist. Dose reduction techniques were used. All stenosis measurements made according to NASCET criteria unless otherwise specified.    FINDINGS:   NONCONTRAST HEAD CT:   INTRACRANIAL CONTENTS: No acute intracranial hemorrhage, extraaxial collection, or mass effect.  Scattered hypodensities within the bilateral periventricular, deep, and subcortical cerebral white matter, nonspecific although may be related to the chronic   microvascular ischemia. These limits the assessment for a small acute infarct. No evidence of an acute large vascular distribution transcortical infarct. Encephalomalacia within the right middle frontal gyrus, consistent with a chronic infarct. Multiple   bilateral punctate gyral calcifications. Mild generalized parenchymal volume loss. No acute hydrocephalus.     VISUALIZED  ORBITS/SINUSES/MASTOIDS: No acute intraorbital abnormality. No significant paranasal sinus or mastoid mucosal disease.     BONES/SOFT TISSUES: No acute abnormality. Degenerative changes of the left temporomandibular joint.    HEAD CTA:  ANTERIOR CIRCULATION: No high-grade stenosis/occlusion, aneurysm, or high flow vascular malformation. A 2 mm right posterior communicating artery origin outpouching, likely an infundibulum. Standard [place] anatomy.    POSTERIOR CIRCULATION: No high-grade stenosis/occlusion, aneurysm, or high flow vascular malformation. Dominant left and smaller right vertebral artery contribute to a normal basilar artery.     DURAL VENOUS SINUSES: Expected enhancement of the major dural venous sinuses.    NECK CTA:  RIGHT CAROTID: No measurable stenosis or dissection.    LEFT CAROTID: No measurable stenosis or dissection.    VERTEBRAL ARTERIES: No focal high-grade stenosis or dissection. Balanced vertebral arteries.    AORTIC ARCH: Classic aortic arch anatomy with no significant stenosis at the origin of the great vessels.    NONVASCULAR STRUCTURES: A right upper lobe pulmonary cyst and a calcified granuloma.      Impression    IMPRESSION:   HEAD CT:  1.  No acute intracranial hemorrhage, extra-axial fluid collection, or mass effect.  2.  Bilateral cerebral white matter hypodensities. While they may represent the sequela of chronic microvascular ischemia, they remain age-indeterminate in the absence of prior exams for comparison and may mask a small acute infarct. An MRI of the brain   may be helpful for further evaluation, as clinically indicated.  3.  Chronic changes, as above.    HEAD CTA:   1.  No large vessel occlusion, high-grade stenosis, or aneurysm.    NECK CTA:  1.  No carotid or vertebral artery hemodynamically significant stenosis, dissection, or pseudoaneurysm.   CTA Head Neck with Contrast    Addendum: 11/7/2022    Findings of unenhanced CT were discussed over the phone with  Dr. Roldan on 11/07/2022 at 1801 CST.          Narrative    EXAM: CTA HEAD NECK W CONTRAST, CT HEAD W/O CONTRAST  LOCATION: [facility]  DATE/TIME: 11/7/2022 5:42 PM    INDICATION: Left-sided weakness and left facial droop.  COMPARISON: None.  CONTRAST: 75 ml Isovue 370  TECHNIQUE: Head and neck CT angiogram with IV contrast. Noncontrast head CT followed by axial helical CT images of the head and neck vessels obtained during the arterial phase of intravenous contrast administration. Axial 2D reconstructed images and   multiplanar 3D MIP reconstructed images of the head and neck vessels were performed by the technologist. Dose reduction techniques were used. All stenosis measurements made according to NASCET criteria unless otherwise specified.    FINDINGS:   NONCONTRAST HEAD CT:   INTRACRANIAL CONTENTS: No acute intracranial hemorrhage, extraaxial collection, or mass effect.  Scattered hypodensities within the bilateral periventricular, deep, and subcortical cerebral white matter, nonspecific although may be related to the chronic   microvascular ischemia. These limits the assessment for a small acute infarct. No evidence of an acute large vascular distribution transcortical infarct. Encephalomalacia within the right middle frontal gyrus, consistent with a chronic infarct. Multiple   bilateral punctate gyral calcifications. Mild generalized parenchymal volume loss. No acute hydrocephalus.     VISUALIZED ORBITS/SINUSES/MASTOIDS: No acute intraorbital abnormality. No significant paranasal sinus or mastoid mucosal disease.     BONES/SOFT TISSUES: No acute abnormality. Degenerative changes of the left temporomandibular joint.    HEAD CTA:  ANTERIOR CIRCULATION: No high-grade stenosis/occlusion, aneurysm, or high flow vascular malformation. A 2 mm right posterior communicating artery origin outpouching, likely an infundibulum. Standard [place] anatomy.    POSTERIOR CIRCULATION: No  high-grade stenosis/occlusion, aneurysm, or high flow vascular malformation. Dominant left and smaller right vertebral artery contribute to a normal basilar artery.     DURAL VENOUS SINUSES: Expected enhancement of the major dural venous sinuses.    NECK CTA:  RIGHT CAROTID: No measurable stenosis or dissection.    LEFT CAROTID: No measurable stenosis or dissection.    VERTEBRAL ARTERIES: No focal high-grade stenosis or dissection. Balanced vertebral arteries.    AORTIC ARCH: Classic aortic arch anatomy with no significant stenosis at the origin of the great vessels.    NONVASCULAR STRUCTURES: A right upper lobe pulmonary cyst and a calcified granuloma.      Impression    IMPRESSION:   HEAD CT:  1.  No acute intracranial hemorrhage, extra-axial fluid collection, or mass effect.  2.  Bilateral cerebral white matter hypodensities. While they may represent the sequela of chronic microvascular ischemia, they remain age-indeterminate in the absence of prior exams for comparison and may mask a small acute infarct. An MRI of the brain   may be helpful for further evaluation, as clinically indicated.  3.  Chronic changes, as above.    HEAD CTA:   1.  No large vessel occlusion, high-grade stenosis, or aneurysm.    NECK CTA:  1.  No carotid or vertebral artery hemodynamically significant stenosis, dissection, or pseudoaneurysm.   CBC with Platelets & Differential    Narrative    The following orders were created for panel order CBC with Platelets & Differential.  Procedure                               Abnormality         Status                     ---------                               -----------         ------                     CBC with platelets and d...[[id number]]  Abnormal            Final result                 Please view results for these tests on the individual orders.   Basic metabolic panel   Result Value Ref Range    Sodium 135 (L) 136 - 145 mmol/L    Potassium 3.9 3.4 - 5.3 mmol/L    Chloride 102 98 - 107  mmol/L    Carbon Dioxide (CO2) 22 22 - 29 mmol/L    Anion Gap 11 7 - 15 mmol/L    Urea Nitrogen 21.4 8.0 - 23.0 mg/dL    Creatinine 1.02 0.67 - 1.17 mg/dL    Calcium 7.9 (L) 8.8 - 10.2 mg/dL    Glucose 128 (H) 70 - 99 mg/dL    GFR Estimate 75 >60 mL/min/1.73m2   INR   Result Value Ref Range    INR 1.33 (H) 0.85 - 1.15   Partial thromboplastin time   Result Value Ref Range    aPTT 31 22 - 38 Seconds   Troponin T, High Sensitivity   Result Value Ref Range    Troponin T, High Sensitivity 35 (H) <=22 ng/L   CBC with platelets and differential   Result Value Ref Range    WBC Count 17.4 (H) 4.0 - 11.0 10e3/uL    RBC Count 3.13 (L) 4.40 - 5.90 10e6/uL    Hemoglobin 10.0 (L) 13.3 - 17.7 g/dL    Hematocrit 29.5 (L) 40.0 - 53.0 %    MCV 94 78 - 100 fL    MCH 31.9 26.5 - 33.0 pg    MCHC 33.9 31.5 - 36.5 g/dL    RDW 13.2 10.0 - 15.0 %    Platelet Count 165 150 - 450 10e3/uL    % Neutrophils 88 %    % Lymphocytes 3 %    % Monocytes 9 %    % Eosinophils 0 %    % Basophils 0 %    % Immature Granulocytes 0 %    NRBCs per 100 WBC 0 <1 /100    Absolute Neutrophils 15.1 (H) 1.6 - 8.3 10e3/uL    Absolute Lymphocytes 0.6 (L) 0.8 - 5.3 10e3/uL    Absolute Monocytes 1.6 (H) 0.0 - 1.3 10e3/uL    Absolute Eosinophils 0.0 0.0 - 0.7 10e3/uL    Absolute Basophils 0.0 0.0 - 0.2 10e3/uL    Absolute Immature Granulocytes 0.1 <=0.4 10e3/uL    Absolute NRBCs 0.0 10e3/uL   Hepatic panel   Result Value Ref Range    Protein Total 5.8 (L) 6.4 - 8.3 g/dL    Albumin 3.2 (L) 3.5 - 5.2 g/dL    Bilirubin Total 1.1 <=1.2 mg/dL    Alkaline Phosphatase 72 40 - 129 U/L    AST 27 10 - 50 U/L    ALT 16 10 - 50 U/L    Bilirubin Direct 0.32 (H) 0.00 - 0.30 mg/dL   Alcohol level blood   Result Value Ref Range    Alcohol ethyl <0.01 (H) <=0.00 g/dL   Ammonia (on ice)   Result Value Ref Range    Ammonia 13 (L) 16 - 60 umol/L   Troponin T, High Sensitivity   Result Value Ref Range    Troponin T, High Sensitivity 36 (H) <=22 ng/L   XR Abdomen 1 View    Narrative     EXAM: XR ABDOMEN 1 VIEW  LOCATION: [facility]  DATE/TIME: 11/7/2022 7:27 PM    INDICATION: encephalopathy Pre MRI screening  COMPARISON: None.      Impression    IMPRESSION: Negative abdomen. Bowel gas pattern is normal. Nothing for obstruction or free air. Contrast present throughout the urinary collecting system. No metallic foreign body identified.   XR Chest 1 View    Narrative    EXAM: XR CHEST 1 VIEW  LOCATION: [facility]  DATE/TIME: 11/7/2022 7:28 PM    INDICATION: encephalopathy  COMPARISON: None.      Impression    IMPRESSION: Low lung volumes. No definite focal pneumonia. Heart size likely upper limits of normal with heavy calcification of mitral annulus. Mild central hilar congestion may relate to the low lung volumes.  No metallic foreign body.   MR Brain w/o & w Contrast   Result Value Ref Range    Radiologist flags Acute infarcts (AA)     Narrative    EXAM: MR BRAIN W/O and W CONTRAST  LOCATION: [facility]  DATE/TIME: 11/7/2022 8:24 PM    INDICATION: Encephalopathy  COMPARISON:  Earlier same day CTs.  CONTRAST: Gadavist 8 mL  TECHNIQUE: Routine multiplanar multisequence head MRI without and with intravenous contrast.    FINDINGS:  INTRACRANIAL CONTENTS: Numerous punctate scattered bilateral supratentorial and infratentorial acute infarcts, specifically involving the right hippocampus (series 3.2, image 13). No mass, acute hemorrhage, or extra-axial fluid collections. Scattered   nonspecific T2/FLAIR hyperintensities within the cerebral white matter most consistent with mild chronic microvascular ischemic change. Mild generalized cerebral atrophy. No hydrocephalus. Normal position of the cerebellar tonsils. No pathologic contrast   enhancement.    SELLA: No abnormality accounting for technique.    OSSEOUS STRUCTURES/SOFT TISSUES: Normal marrow signal. The major intracranial vascular flow voids are maintained.     ORBITS:  No abnormality accounting for technique.     SINUSES/MASTOIDS: No paranasal sinus mucosal disease. No middle ear or mastoid effusion.       Impression    IMPRESSION:  1.  Numerous punctate scattered supratentorial and infratentorial acute infarcts, specifically involving the right hippocampus. This distribution is suspicious for a thromboembolic etiology. No significant mass effect or hemorrhagic conversion.       [Critical Result: Acute infarcts]    Finding was identified on 11/7/2022 8:24 PM.     Dr. Nick Roldan was contacted by me on 11/7/2022 8:33 PM and verbalized understanding of the critical result.        For the majority of the shift this patient's behavior was Green     Cardiac Rhythm: Normal Sinus and Bradycardia  Pt needs tele? Yes  Skin/wound Issues: None    Code Status: Full Code    Pain control: good    Nausea control: good    Abnormal labs/tests/findings requiring intervention: Stroke, symptoms are confusion    Patient tested for COVID 19 prior to admission: YES     OBS brochure/video discussed/provided to patient/family: N/A     Family present during ED course? No     Family Comments/Social Situation comments: Can call wife and leave voicemails    Tasks needing completion: None    Zuhair Ling, RN

## 2022-11-08 NOTE — PROGRESS NOTES
MRI results came back showing bilateral anterior and posterior circulation acute/ subacute embolic appearing ischemic strokes. 3 T sign    Not candidate for acute treatment   Plan:  Admit   Start aspirin 325 mg daily, will eventually need to transition to DOAC/coumadin base don embolic etiology  SBP< 180  Complete rest of stroke workup TTE, TSH, LDL, A1c  PT OT speech  Please obtain PAN CT Chest abdomen pelvis as 3 T sign is a high suspicious sign for internal malignancy

## 2022-11-08 NOTE — PROGRESS NOTES
Impression:  Pt seen by ST due to diagnosis of stroke. Swallow function is WNL for a regular consistency diet.  Pt presents with cognitive-linguistic deficits and short verbal responses for communication.  Recommend continue ST for speech/language deficits.       Speech Therapy Evaluation  11/08/22    Appointment Info   Signing Clinician's Name / Credentials (SLP) Jailyn Stevenson MA, CCC/SLP   General Information   Onset of Illness/Injury or Date of Surgery 11/07/22   Referring Physician Tita Salas MD   Patient/Family Therapy Goal Statement (SLP) Pt did not state when asked.  Pt confused.   Pertinent History of Current Problem Leon Alvarado is a 77 year old male who presents for stroke symptoms.  Initial history obtained from EMS.  They report that they were called for altered mental status that started several hours prior to their arrival and they were concerned for left-sided weakness and left facial droop.  Today: pt is able to answer some basic questions.  Not oriented to place or time.   General Observations Pt is seen bedside for ST assessment and is pleasant and alert.   Pain Assessment   Patient Currently in Pain No   Type of Evaluation   Type of Evaluation Swallow Evaluation;Speech, Language, Cognition   Oral Motor   Oral Musculature generally intact   Structural Abnormalities none present   Dentition (Oral Motor)   Dentition (Oral Motor) natural dentition;adequate dentition   General Swallowing Observations   Past History of Dysphagia No   Swallowing Evaluation Clinical swallow evaluation   Clinical Swallow Evaluation   Feeding Assistance set up only required   Clinical Swallow Evaluation Textures Trialed thin liquids;extremely thick liquids;solid foods   Clinical Swallow Eval: Thin Liquid Texture Trial   Mode of Presentation, Thin Liquids cup   Oral Phase of Swallow WFL   Pharyngeal Phase of Swallow intact   Diagnostic Statement WNL   Clinical Swallow Eval: Extremely Thick Liquids   Mode of  Presentation spoon;self-fed   Oral Phase WFL   Pharyngeal Phase intact   Diagnostic Statement WNL   Clinical Swallow Evaluation: Solid Food Texture Trial   Mode of Presentation self-fed   Volume Presented cracker   Oral Phase WFL   Pharyngeal Phase intact   Diagnostic Statement WNL   Esophageal Phase of Swallow   Patient reports or presents with symptoms of esophageal dysphagia No   Swallowing Recommendations   Diet Consistency Recommendations regular diet   Comment, Swallowing Recommendations Oral and pharyngeal swallow WNL for a regular consistency diet.   Auditory Comprehension   Follows Commands (Auditory Comprehension) 1-step command;2-step commands;WFL   Yes/No Questions (Auditory Comprehension) WFL;simple/factual questions   1 Step, Follows Commands (Auditory Comprehension) intact   2 Step, Follows Commands (Auditory Comprehension) intact   Simple/Factual Questions (Auditory Comprehension) intact   Verbal Expression   Comment, Assesment (Verbal Expression) Delayed responses at times.  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  Mostly word  and phrase level responses. Pt attempted all tasks.   Confrontational Naming (Verbal Expression) WFL;body parts;objects   Automatic Speech (Verbal Expression) counting;days of the week   Counting, Automatic Speech (Verbal Expression) intact   Days of the Week, Automatic Speech (Verbal Expression) impaired;achieved in unison  (5/7 days)   Objects, Confrontational Naming (Verbal Expression) intact   Body Parts, Confrontational Naming (Verbal Expression) intact   Pragmatic Language   Nonverbal Skills (Pragmatic Language) WFL;eye contact;facial affect   Reading Comprehension   Comment, Assessment (Reading Comprehension) DNT   Written Language   Comment, Assessment (Written Language) DNT   Cognition   Cognitive Function memory deficit;unable/difficult to assess   Cognitive Status Pt alert.   Additional cognitive-linguistic evaluation indicated  Recommended   Cognitive  Status Exam Comments Pt confused and only oriented to self.   Orientation Status (Cognition) oriented to;person;verbal cues/prompts needed for orientation   Affect/Mental Status (Cognition) WFL   Follows Commands (Cognition) follows one-step commands;follows two-step commands   Memory Deficit (Cognition) short-term memory   General Therapy Interventions   Planned Therapy Interventions Cognitive Treatment;Language   Cognitive treatment External memory strategy training   Language Verbal expression   Intervention Comments Further assessment for speech/language deficits recommended.   Clinical Impression   Criteria for Skilled Therapeutic Interventions Met (SLP Eval) Yes, treatment indicated   SLP Diagnosis Cognitive linguistic deficits   Risks & Benefits of therapy have been explained evaluation/treatment results reviewed;patient   Clinical Impression Comments Pt seen by ST due to diagnosis of stroke. Swallow function is WNL for a regular consistency diet.  Pt presents with cognitive-linguistic deficits and short verbal responses for communication.  Recommend continue ST for speech/language deficits.   SLP Total Evaluation Time   Eval: oral/pharyngeal swallow function, clinical swallow Minutes (66600) 15   Eval: Sound production with lang comprehension and expression Minutes (36363) 15   SLP Goals   Therapy Frequency (SLP Eval) 5 times/wk   SLP Goals Communication   SLP: Communicate basic wants and needs minimal assist;verbally   SLP Discharge Planning   SLP Plan speech,language, cognitive linguistic goals.   SLP Brief overview of current status  Recommend continue ST upon discharge.   Total Session Time   Total Session Time (sum of timed and untimed services) 30

## 2022-11-08 NOTE — PROGRESS NOTES
Skin affirmation note    Admitting nurse completed full skin assessment, Juan A score and Juan A interventions. This writer agrees with the initial skin assessment findings.

## 2022-11-08 NOTE — PROVIDER NOTIFICATION
"Provider Nikki paged: \"Bed 4 M. Jenny: Critically high trop at 298, improved from prior at 366. FYI I talked to the wife, pt is normally orientated, no confusion. Justa DAMON\"  "

## 2022-11-08 NOTE — PROGRESS NOTES
"   11/08/22 1100   Appointment Info   Signing Clinician's Name / Credentials (OT) Divine Vargason, OTR/L   Living Environment   People in Home spouse   Current Living Arrangements apartment   Home Accessibility no concerns   Self-Care   Fall history within last six months no  (per pt, however unreliable historian.)   Activity/Exercise/Self-Care Comment Per pt: independent with ADLs and functional mobility at baseline. Has Cane at home but does not use at baseline.   General Information   Onset of Illness/Injury or Date of Surgery 11/08/22   Referring Physician Tita Salas MD   Patient/Family Therapy Goal Statement (OT) None stated, however open to working with therapy. Currently pt appears to be confused.   Additional Occupational Profile Info/Pertinent History of Current Problem Acute bilateral CVA involving multiple territories   General Observations and Info sleeping upon arrival and does appear somewhat sleepy during evaluation.   Cognitive Status Examination   Orientation Status person   Behavioral Issues withdrawn   Affect/Mental Status (Cognitive) confused   Follows Commands follows one-step commands;25-49% accuracy   Cognitive Status Comments Unable to state correct place, year or month. States \"[place]\" for place. Attempted to re-orient pt to place and year. Unable to recall place after 8 minutes and states \"1028\" for year.   Pain Assessment   Patient Currently in Pain No   Range of Motion Comprehensive   Comment, General Range of Motion B UE ROM: WNL   Strength Comprehensive (MMT)   Comment, General Manual Muscle Testing (MMT) Assessment Difficulty to assess as pt is unable to follow commands for MMT.   Coordination   Coordination Comments Difficulty to assess accurately as pt is not consistently following commands. L UE does appear dysmetric with difficulty with finger to nose and to accurately touch therapists hand.   Transfers   Transfer Comments CGA for sit to stand. Unsteady. no walker present " in pt's room, ICU floor or med surg floor. Needs handhold assist for standing balance and CGA.   Balance   Balance Comments unsteady- would benefit from a walker at this time.   Activities of Daily Living   BADL Assessment/Intervention lower body dressing   Lower Body Dressing Assessment/Training   Comment, (Lower Body Dressing) Unsteady in sitting while reaching down to socks.   [place] Level (Lower Body Dressing) contact guard assist   Clinical Impression   Criteria for Skilled Therapeutic Interventions Met (OT) Yes, treatment indicated   OT Diagnosis decreased independence with ADLs and functional mobility   OT Problem List-Impairments impacting ADL balance;cognition;activity tolerance impaired;motor control;coordination;strength   Assessment of Occupational Performance 5 or more Performance Deficits   Identified Performance Deficits dressing, toileting, showering, functional mobility, home management tasks   Planned Therapy Interventions (OT) ADL retraining;cognition;strengthening;progressive activity/exercise   Clinical Decision Making Complexity (OT) low complexity   Risk & Benefits of therapy have been explained patient;participants included;participants voiced agreement with care plan;current/potential barriers reviewed;risks/benefits reviewed;care plan/treatment goals reviewed;evaluation/treatment results reviewed   OT Total Evaluation Time   OT Eval, Low Complexity Minutes (23861) 10   OT Goals   Therapy Frequency (OT) Daily   Interventions   Interventions Quick Adds Self-Care/Home Management;Therapeutic Procedures/Exercise   Self-Care/Home Management   Self-Care/Home Mgmt/ADL, Compensatory, Meal Prep Minutes (29254) 10   Symptoms Noted During/After Treatment (Meal Preparation/Planning Training) fatigue   Treatment Detail/Skilled Intervention OT: CGA for sit to stand. Needs handhold assist from therapist. stands x1 minute with CGA and handhold assist. Marches in place x30 seconds. Then takes 1 step  backwards towards chair. Min A to control stand to sit and cues to reach back to chair prior to sitting. Pt left seated up in chair with all needs wihtin reach and tabs alarm on.   Therapeutic Procedures/Exercise   Therapeutic Procedure: strength, endurance, ROM, flexibillity minutes (33550) 10   Symptoms Noted During/After Treatment none   Treatment Detail/Skilled Intervention OT: Pt completes 3 B UE exercises x10 reps. exercises include: shoulder FF, horizontal abd/add and forward punches. Coordination exercises of pt using L UE to touch therapists hand in different planes/positions with focus on accuracy.   OT Discharge Planning   OT Plan standing g/h, toilet transfer, UE coordination exercises.   OT Discharge Recommendation (DC Rec) Transitional Care Facility;Acute Rehab Center-Motivated patient will benefit from intensive, interdisciplinary therapy.  Anticipate will be able to tolerate 3 hours of therapy per day   OT Rationale for DC Rec Pt may be appropraite for ARU as was independent prior to admission and presents with impairment in ADLs, functional mobiltiy and cognition. Would likely benefit from 3hours of therapy/day.   OT Brief overview of current status CGA for sit to stand. Unsteady needign handhold assist and CGA for standing. CGA for lower body dressing as pt is unsteady in sitting.   Total Session Time   Timed Code Treatment Minutes 20   Total Session Time (sum of timed and untimed services) 30

## 2022-11-08 NOTE — ED PROVIDER NOTES
Emergency Department Patient Sign-out       Brief HPI:  This is a 77 year old male signed out to me by Dr. KAYLA Roldan at 11.20pm at shift change. See Dr. Roldan's ED note for additional details of care prior to shift change and handoff.  Briefly by report in summary at shift change and handoff is a 77-year-old male who was found to have multiple subacute/acute appearing ischemic strokes involving the bilateral anterior and posterior circulation and MRI.  After stroke neurology consultation patient is boarding the ED in need of inpatient care on 3 to 5 mg aspirin daily with plan for a.m. TTE for further stroke work-up and care.  Okay to admit to [facility] if a bed becomes available.       Significant Events prior to my assuming care:   Spoke with Dr. ARDEN Salas at 12.25AM-admitting provider who agreed to accept patient for inpatient admission after reviewing history as relayed to me at the time of shift change and handoff.  We discussed diagnostic work-up prior to shift change and handoff and preliminary recommendations by the stroke neurology consult team.       ED to Inpatient Handoff:    Discussed with Dr ARDEN Salas at 12.25AM  Patient accepted for Inpatient Stay  Pending studies include TTE in the AM. Stroke Neurology consult follow-up  Code Status: Per discussion with initial treating provider.       Updated patient about admission to [facility] at 12:30 AM.    Exam:   Patient Vitals for the past 24 hrs:   BP Temp Temp src Pulse Resp SpO2 Weight   11/07/22 2330 108/60 -- -- 85 23 97 % --   11/07/22 2300 99/82 -- -- 81 22 97 % --   11/07/22 2230 108/50 -- -- 76 -- -- --   11/07/22 1900 112/60 -- -- 75 28 97 % --   11/07/22 1800 117/61 -- -- 80 11 97 % --   11/07/22 1746 117/65 98.7  F (37.1  C) Oral 89 -- 97 % 79.4 kg (175 lb)       ED RESULTS:   Results for orders placed or performed during the hospital encounter of 11/07/22 (from the past 24 hour(s))   Glucose by meter     Status: Abnormal    Collection Time: 11/07/22   5:41 PM   Result Value Ref Range    GLUCOSE BY METER POCT 123 (H) 70 - 99 mg/dL   Head CT w/o contrast     Status: None    Collection Time: 11/07/22  5:42 PM    Addendum: 11/7/2022    Findings of unenhanced CT were discussed over the phone with Dr. Roldan on 11/07/2022 at 1801 CST.          Narrative    EXAM: CTA HEAD NECK W CONTRAST, CT HEAD W/O CONTRAST  LOCATION: [facility]  DATE/TIME: 11/7/2022 5:42 PM    INDICATION: Left-sided weakness and left facial droop.  COMPARISON: None.  CONTRAST: 75 ml Isovue 370  TECHNIQUE: Head and neck CT angiogram with IV contrast. Noncontrast head CT followed by axial helical CT images of the head and neck vessels obtained during the arterial phase of intravenous contrast administration. Axial 2D reconstructed images and   multiplanar 3D MIP reconstructed images of the head and neck vessels were performed by the technologist. Dose reduction techniques were used. All stenosis measurements made according to NASCET criteria unless otherwise specified.    FINDINGS:   NONCONTRAST HEAD CT:   INTRACRANIAL CONTENTS: No acute intracranial hemorrhage, extraaxial collection, or mass effect.  Scattered hypodensities within the bilateral periventricular, deep, and subcortical cerebral white matter, nonspecific although may be related to the chronic   microvascular ischemia. These limits the assessment for a small acute infarct. No evidence of an acute large vascular distribution transcortical infarct. Encephalomalacia within the right middle frontal gyrus, consistent with a chronic infarct. Multiple   bilateral punctate gyral calcifications. Mild generalized parenchymal volume loss. No acute hydrocephalus.     VISUALIZED ORBITS/SINUSES/MASTOIDS: No acute intraorbital abnormality. No significant paranasal sinus or mastoid mucosal disease.     BONES/SOFT TISSUES: No acute abnormality. Degenerative changes of the left temporomandibular joint.    HEAD CTA:  ANTERIOR  CIRCULATION: No high-grade stenosis/occlusion, aneurysm, or high flow vascular malformation. A 2 mm right posterior communicating artery origin outpouching, likely an infundibulum. Standard [place] anatomy.    POSTERIOR CIRCULATION: No high-grade stenosis/occlusion, aneurysm, or high flow vascular malformation. Dominant left and smaller right vertebral artery contribute to a normal basilar artery.     DURAL VENOUS SINUSES: Expected enhancement of the major dural venous sinuses.    NECK CTA:  RIGHT CAROTID: No measurable stenosis or dissection.    LEFT CAROTID: No measurable stenosis or dissection.    VERTEBRAL ARTERIES: No focal high-grade stenosis or dissection. Balanced vertebral arteries.    AORTIC ARCH: Classic aortic arch anatomy with no significant stenosis at the origin of the great vessels.    NONVASCULAR STRUCTURES: A right upper lobe pulmonary cyst and a calcified granuloma.      Impression    IMPRESSION:   HEAD CT:  1.  No acute intracranial hemorrhage, extra-axial fluid collection, or mass effect.  2.  Bilateral cerebral white matter hypodensities. While they may represent the sequela of chronic microvascular ischemia, they remain age-indeterminate in the absence of prior exams for comparison and may mask a small acute infarct. An MRI of the brain   may be helpful for further evaluation, as clinically indicated.  3.  Chronic changes, as above.    HEAD CTA:   1.  No large vessel occlusion, high-grade stenosis, or aneurysm.    NECK CTA:  1.  No carotid or vertebral artery hemodynamically significant stenosis, dissection, or pseudoaneurysm.   CTA Head Neck with Contrast     Status: None    Collection Time: 11/07/22  5:42 PM    Addendum: 11/7/2022    Findings of unenhanced CT were discussed over the phone with Dr. Roldan on 11/07/2022 at 1801 CST.          Narrative    EXAM: CTA HEAD NECK W CONTRAST, CT HEAD W/O CONTRAST  LOCATION: [facility]  DATE/TIME: 11/7/2022 5:42  PM    INDICATION: Left-sided weakness and left facial droop.  COMPARISON: None.  CONTRAST: 75 ml Isovue 370  TECHNIQUE: Head and neck CT angiogram with IV contrast. Noncontrast head CT followed by axial helical CT images of the head and neck vessels obtained during the arterial phase of intravenous contrast administration. Axial 2D reconstructed images and   multiplanar 3D MIP reconstructed images of the head and neck vessels were performed by the technologist. Dose reduction techniques were used. All stenosis measurements made according to NASCET criteria unless otherwise specified.    FINDINGS:   NONCONTRAST HEAD CT:   INTRACRANIAL CONTENTS: No acute intracranial hemorrhage, extraaxial collection, or mass effect.  Scattered hypodensities within the bilateral periventricular, deep, and subcortical cerebral white matter, nonspecific although may be related to the chronic   microvascular ischemia. These limits the assessment for a small acute infarct. No evidence of an acute large vascular distribution transcortical infarct. Encephalomalacia within the right middle frontal gyrus, consistent with a chronic infarct. Multiple   bilateral punctate gyral calcifications. Mild generalized parenchymal volume loss. No acute hydrocephalus.     VISUALIZED ORBITS/SINUSES/MASTOIDS: No acute intraorbital abnormality. No significant paranasal sinus or mastoid mucosal disease.     BONES/SOFT TISSUES: No acute abnormality. Degenerative changes of the left temporomandibular joint.    HEAD CTA:  ANTERIOR CIRCULATION: No high-grade stenosis/occlusion, aneurysm, or high flow vascular malformation. A 2 mm right posterior communicating artery origin outpouching, likely an infundibulum. Standard [place] of [place] anatomy.    POSTERIOR CIRCULATION: No high-grade stenosis/occlusion, aneurysm, or high flow vascular malformation. Dominant left and smaller right vertebral artery contribute to a normal basilar artery.     DURAL VENOUS SINUSES:  Expected enhancement of the major dural venous sinuses.    NECK CTA:  RIGHT CAROTID: No measurable stenosis or dissection.    LEFT CAROTID: No measurable stenosis or dissection.    VERTEBRAL ARTERIES: No focal high-grade stenosis or dissection. Balanced vertebral arteries.    AORTIC ARCH: Classic aortic arch anatomy with no significant stenosis at the origin of the great vessels.    NONVASCULAR STRUCTURES: A right upper lobe pulmonary cyst and a calcified granuloma.      Impression    IMPRESSION:   HEAD CT:  1.  No acute intracranial hemorrhage, extra-axial fluid collection, or mass effect.  2.  Bilateral cerebral white matter hypodensities. While they may represent the sequela of chronic microvascular ischemia, they remain age-indeterminate in the absence of prior exams for comparison and may mask a small acute infarct. An MRI of the brain   may be helpful for further evaluation, as clinically indicated.  3.  Chronic changes, as above.    HEAD CTA:   1.  No large vessel occlusion, high-grade stenosis, or aneurysm.    NECK CTA:  1.  No carotid or vertebral artery hemodynamically significant stenosis, dissection, or pseudoaneurysm.   CBC with Platelets & Differential     Status: Abnormal    Collection Time: 11/07/22  5:51 PM    Narrative    The following orders were created for panel order CBC with Platelets & Differential.  Procedure                               Abnormality         Status                     ---------                               -----------         ------                     CBC with platelets and d...[[id number]]  Abnormal            Final result                 Please view results for these tests on the individual orders.   Basic metabolic panel     Status: Abnormal    Collection Time: 11/07/22  5:51 PM   Result Value Ref Range    Sodium 135 (L) 136 - 145 mmol/L    Potassium 3.9 3.4 - 5.3 mmol/L    Chloride 102 98 - 107 mmol/L    Carbon Dioxide (CO2) 22 22 - 29 mmol/L    Anion Gap 11 7 - 15  mmol/L    Urea Nitrogen 21.4 8.0 - 23.0 mg/dL    Creatinine 1.02 0.67 - 1.17 mg/dL    Calcium 7.9 (L) 8.8 - 10.2 mg/dL    Glucose 128 (H) 70 - 99 mg/dL    GFR Estimate 75 >60 mL/min/1.73m2   INR     Status: Abnormal    Collection Time: 11/07/22  5:51 PM   Result Value Ref Range    INR 1.33 (H) 0.85 - 1.15   Partial thromboplastin time     Status: Normal    Collection Time: 11/07/22  5:51 PM   Result Value Ref Range    aPTT 31 22 - 38 Seconds   Troponin T, High Sensitivity     Status: Abnormal    Collection Time: 11/07/22  5:51 PM   Result Value Ref Range    Troponin T, High Sensitivity 35 (H) <=22 ng/L   CBC with platelets and differential     Status: Abnormal    Collection Time: 11/07/22  5:51 PM   Result Value Ref Range    WBC Count 17.4 (H) 4.0 - 11.0 10e3/uL    RBC Count 3.13 (L) 4.40 - 5.90 10e6/uL    Hemoglobin 10.0 (L) 13.3 - 17.7 g/dL    Hematocrit 29.5 (L) 40.0 - 53.0 %    MCV 94 78 - 100 fL    MCH 31.9 26.5 - 33.0 pg    MCHC 33.9 31.5 - 36.5 g/dL    RDW 13.2 10.0 - 15.0 %    Platelet Count 165 150 - 450 10e3/uL    % Neutrophils 88 %    % Lymphocytes 3 %    % Monocytes 9 %    % Eosinophils 0 %    % Basophils 0 %    % Immature Granulocytes 0 %    NRBCs per 100 WBC 0 <1 /100    Absolute Neutrophils 15.1 (H) 1.6 - 8.3 10e3/uL    Absolute Lymphocytes 0.6 (L) 0.8 - 5.3 10e3/uL    Absolute Monocytes 1.6 (H) 0.0 - 1.3 10e3/uL    Absolute Eosinophils 0.0 0.0 - 0.7 10e3/uL    Absolute Basophils 0.0 0.0 - 0.2 10e3/uL    Absolute Immature Granulocytes 0.1 <=0.4 10e3/uL    Absolute NRBCs 0.0 10e3/uL   Hepatic panel     Status: Abnormal    Collection Time: 11/07/22  5:51 PM   Result Value Ref Range    Protein Total 5.8 (L) 6.4 - 8.3 g/dL    Albumin 3.2 (L) 3.5 - 5.2 g/dL    Bilirubin Total 1.1 <=1.2 mg/dL    Alkaline Phosphatase 72 40 - 129 U/L    AST 27 10 - 50 U/L    ALT 16 10 - 50 U/L    Bilirubin Direct 0.32 (H) 0.00 - 0.30 mg/dL   Alcohol level blood     Status: Abnormal    Collection Time: 11/07/22  6:57 PM    Result Value Ref Range    Alcohol ethyl <0.01 (H) <=0.00 g/dL   Ammonia (on ice)     Status: Abnormal    Collection Time: 11/07/22  6:57 PM   Result Value Ref Range    Ammonia 13 (L) 16 - 60 umol/L   Troponin T, High Sensitivity     Status: Abnormal    Collection Time: 11/07/22  6:57 PM   Result Value Ref Range    Troponin T, High Sensitivity 36 (H) <=22 ng/L   XR Abdomen 1 View     Status: None    Collection Time: 11/07/22  7:27 PM    Narrative    EXAM: XR ABDOMEN 1 VIEW  LOCATION: [facility]  DATE/TIME: 11/7/2022 7:27 PM    INDICATION: encephalopathy Pre MRI screening  COMPARISON: None.      Impression    IMPRESSION: Negative abdomen. Bowel gas pattern is normal. Nothing for obstruction or free air. Contrast present throughout the urinary collecting system. No metallic foreign body identified.   XR Chest 1 View     Status: None    Collection Time: 11/07/22  7:28 PM    Narrative    EXAM: XR CHEST 1 VIEW  LOCATION: [facility]  DATE/TIME: 11/7/2022 7:28 PM    INDICATION: encephalopathy  COMPARISON: None.      Impression    IMPRESSION: Low lung volumes. No definite focal pneumonia. Heart size likely upper limits of normal with heavy calcification of mitral annulus. Mild central hilar congestion may relate to the low lung volumes.  No metallic foreign body.   MR Brain w/o & w Contrast     Status: Abnormal    Collection Time: 11/07/22  8:24 PM   Result Value Ref Range    Radiologist flags Acute infarcts (AA)     Narrative    EXAM: MR BRAIN W/O and W CONTRAST  LOCATION: [facility]  DATE/TIME: 11/7/2022 8:24 PM    INDICATION: Encephalopathy  COMPARISON:  Earlier same day CTs.  CONTRAST: Gadavist 8 mL  TECHNIQUE: Routine multiplanar multisequence head MRI without and with intravenous contrast.    FINDINGS:  INTRACRANIAL CONTENTS: Numerous punctate scattered bilateral supratentorial and infratentorial acute infarcts, specifically involving  the right hippocampus (series 3.2, image 13). No mass, acute hemorrhage, or extra-axial fluid collections. Scattered   nonspecific T2/FLAIR hyperintensities within the cerebral white matter most consistent with mild chronic microvascular ischemic change. Mild generalized cerebral atrophy. No hydrocephalus. Normal position of the cerebellar tonsils. No pathologic contrast   enhancement.    SELLA: No abnormality accounting for technique.    OSSEOUS STRUCTURES/SOFT TISSUES: Normal marrow signal. The major intracranial vascular flow voids are maintained.     ORBITS: No abnormality accounting for technique.     SINUSES/MASTOIDS: No paranasal sinus mucosal disease. No middle ear or mastoid effusion.       Impression    IMPRESSION:  1.  Numerous punctate scattered supratentorial and infratentorial acute infarcts, specifically involving the right hippocampus. This distribution is suspicious for a thromboembolic etiology. No significant mass effect or hemorrhagic conversion.       [Critical Result: Acute infarcts]    Finding was identified on 11/7/2022 8:24 PM.     Dr. Nick Roldan was contacted by me on 11/7/2022 8:33 PM and verbalized understanding of the critical result.        ED MEDICATIONS:   Medications   amLODIPine (NORVASC) tablet 10 mg (has no administration in time range)   hydrochlorothiazide (MICROZIDE) capsule 12.5 mg (has no administration in time range)   lisinopril (ZESTRIL) tablet 40 mg (has no administration in time range)   metoprolol succinate ER (TOPROL XL) 24 hr tablet 100 mg (has no administration in time range)   iopamidol (ISOVUE-370) solution 75 mL (75 mLs Intravenous Given 11/7/22 1737)   sodium chloride 0.9 % bag 500mL for CT scan flush use (100 mLs As instructed Given 11/7/22 1738)   gadobutrol (GADAVIST) injection 8 mL (8 mLs Intravenous Given 11/7/22 2023)   aspirin (ASA) tablet 325 mg (325 mg Oral Given 11/7/22 2110)         Impression:    ICD-10-CM    1. Ischemic stroke (H)  I63.9            Plan:    Admit to medicine in need of inpatient care for acute acute/subacute thromboembolic stroke involving bilateral anterior and posterior circulation on MRI. Further stroke care and work-up.  AM TTE pending.  Aspirin 325 daily.  Stroke neurology consulted and following.    MD Elie Simms Ebenezer Tope, MD  11/08/22 9477

## 2022-11-08 NOTE — PROGRESS NOTES
Patient has rested/slept overnight with no complaints of pain or discomfort.  His speech is steady, but he has hesitancy with answers  and is mostly agreeable to what is suggested to him.

## 2022-11-08 NOTE — CONSULTS
[facility]    Stroke Consult Note    Reason for Consult:  stroke    Chief Complaint: Stroke Symptoms and Altered Mental Status       HPI  Leon Alvarado is a 77 year old male with PMH aortic valve stenosis, HTN with acute onset LUE weakness, L facial droop. MRI bilateral anterior and posterior circulation acute/ subacute small embolic appearing ischemic strokes. CTA showed no LVO, joshua ICA athero calcificaiton without significant stenosis. EKG showed atrial fibrillation which is a new diagnosis. Troponin also elevated. Several prominent mediastinal lymph nodes noted on CT CAP, otherwise no evidence of malignancy as underlying source for hypercoagulability.    On current exam he is alert, oriented to hospital and month, short term recall is impaired, has difficulty following multi-step commands, mild L facial weakness, no upper extremity drift but per RN L hand grasp slightly weaker, BLE drift.    Stroke Evaluation Summarized    MRI/Head CT MRI brain: numerous punctate scattered supra and infratentorial acute infarcts   Intracranial Vasculature CTA head: no hemodynamically significant stenosis, no LVO   Cervical Vasculature CTA neck: joshua ICA athero calcificaiton without significant stenosis     Echocardiogram pending   EKG/Telemetry Atrial fibrillation   Other Testing CT CAP: 1.  Several prominent to mildly enlarged nonspecific mediastinal lymph nodes.  2.  Atherosclerotic vascular disease, including at least moderate coronary artery atherosclerosis.  3.  Cholelithiasis without evidence of acute cholecystitis.  4.  Colonic diverticulosis without signs of diverticulitis. Mild circumferential urinary bladder wall thickening and tiny foci of gas in the urinary bladder lumen, which can be seen with cystitis.  Consider correlation with urinalysis if clinically indicated.  5.  Nonspecific trace free pelvic fluid.     LDL  11/8/2022: 69 mg/dL   A1C  11/8/2022: 5.0 %   Troponin 11/8/2022:  "298 ng/L       Impression  Acute ischemic stroke of scattered multifocal supra and infratentorial due to cardioembolism (new atrial fibrillation noted on EKG).      Recommendations  - Neurochecks and Vital Signs every 4 hours   - Initiate oral anticoagulation for secondary stroke prevention, would prefer Eliquis. After discussion with the hospitalist today I will hold off on ordering as she was going to discuss with cardiology whether to start heparin given elevated troponin and concern for ACS.  - From a stroke prevention anticoagulation without antiplatelet therapy is sufficient.   - Consider cardiology evaluation given new diagnosis of atrial fibrillation and elevated troponin  - Hold home antihypertensive for now- SBP has been 100-110s. Limited permissive HTN; goal SBP < 180. Long term outpatient BP goal <130/80.  - Statin: LDL 69, goal 40-70, atorvastatin dose 10 mg  - TTE pending  - Telemetry, EKG  - Bedside Glucose Monitoring  - Nutrition: per nursing  - PT/OT/SLP. May benefit from AIR. Given cognitive issues noted on exam today he should not be driving for the time being.  Recommend OT driving evaluation be completed.  - Stroke Education  - Euthermia, Euglycemia    Per patient request I called and updated his wife Luba.    Patient Follow-up    - in the next 1-2 week(s) with PCP  - in 6-8 weeks with general neurology ([phone])    Thank you for this consult. Will follow for echo result.    Tri Weller PA-C  Vascular Neurology    11/08/2022 1:33 PM  To page me or covering stroke neurology team member, click here: [facility]   Choose \"On Call\" tab at top, then search dropdown box for \"Neurology Adult\", select location, press Enter, then look for stroke/neuro ICU/telestroke.    _____________________________________________________    Clinically Significant Risk Factors Present on Admission         # Hyponatremia: Lowest Na = 135 mmol/L (Ref range: 136-145) in last 2 days, will monitor as appropriate  # " Hypocalcemia: Lowest Ca = 7.9 mg/dL (Ref range: 8.5 - 10.1 mg/dL) in last 2 days, will monitor and replace as appropriate     # Hypoalbuminemia: Lowest albumin = 3.2 g/dL (Ref range: 3.5-5.2) at 11/7/2022  5:51 PM, will monitor as appropriate  # Coagulation Defect: INR = 1.33 (Ref range: 0.85 - 1.15) and/or PTT = 31 Seconds (Ref range: 22 - 38 Seconds), will monitor for bleeding        Past Medical History   No past medical history on file.  Past Surgical History   No past surgical history on file.  Medications   Home Meds  Prior to Admission medications    Medication Sig Start Date End Date Taking? Authorizing Provider   amLODIPine (NORVASC) 10 MG tablet Take 1 tablet by mouth daily 9/19/22  Yes Reported, Patient   B Complex-C (SUPER B COMPLEX PO) Take 1 tablet by mouth daily   Yes Reported, Patient   hydrochlorothiazide (HYDRODIURIL) 12.5 MG tablet Take 1 tablet by mouth daily 3/11/22  Yes Reported, Patient   lisinopril (ZESTRIL) 40 MG tablet Take 1 tablet by mouth daily 6/24/22  Yes Reported, Patient   metoprolol succinate ER (TOPROL XL) 100 MG 24 hr tablet Take 1 tablet by mouth daily 10/13/22  Yes Reported, Patient   sildenafil (VIAGRA) 100 MG tablet Take 50 mg by mouth daily as needed 9/19/22  Yes Reported, Patient   VITAMIN K PO Take 1 tablet by mouth daily   Yes Reported, Patient       Scheduled Meds    [Held by provider] amLODIPine  10 mg Oral Daily     aspirin  325 mg Oral Daily     [START ON 11/9/2022] atorvastatin  10 mg Oral Daily     [Held by provider] hydrochlorothiazide  12.5 mg Oral Daily     [Held by provider] lisinopril  40 mg Oral QPM     [Held by provider] metoprolol succinate ER  100 mg Oral Daily     sodium chloride (PF)  3 mL Intracatheter Q8H       Infusion Meds    - MEDICATION INSTRUCTIONS -       - MEDICATION INSTRUCTIONS -         PRN Meds  labetalol, lidocaine 4%, lidocaine (buffered or not buffered), - MEDICATION INSTRUCTIONS -, - MEDICATION INSTRUCTIONS -, ondansetron **OR**  ondansetron, sodium chloride (PF)    Allergies   No Known Allergies  Family History   No family history on file.  Social History        Review of Systems   The 10 point Review of Systems is negative other than noted in the HPI or here.       PHYSICAL EXAMINATION   Temp:  [97.8  F (36.6  C)-98.7  F (37.1  C)] 98.5  F (36.9  C)  Pulse:  [71-92] 76  Resp:  [11-28] 16  BP: ()/(50-82) 101/62  SpO2:  [97 %-100 %] 98 %    General Exam  General:  patient sitting in the chair without any acute distress    HEENT:  normocephalic/atraumatic  Pulmonary:  no respiratory distress  Skin:  intact     Neuro Exam  Mental Status:  speech clear and fluent, naming and repetition normal, alert, oriented to person/place/month, impaired short term recall, difficulty following multi-step commands  Cranial Nerves:  visual fields intact (tested by nurse), EOMI with normal smooth pursuit, facial sensation intact and symmetric (tested by nurse), hearing not formally tested but intact to conversation, no dysarthria, L facial droop  Motor:  no abnormal movements, no pronator drift, per RN L hand grasp slightly weaker, BLE drift   Reflexes:  unable to test (telestroke)  Sensory:  light touch sensation intact and symmetric throughout upper and lower extremities (assessed by nurse), no extinction on double simultaneous stimulation (assessed by nurse)  Coordination:  normal finger-to-nose and heel-to-shin bilaterally without dysmetria, rapid alternating movements symmetric  Station/Gait:  unable to test due to telestroke    Dysphagia Screen  Per Nursing    Stroke Scales    NIHSS  1a. Level of Consciousness 0-->Alert, keenly responsive   1b. LOC Questions 1-->Answers one question correctly   1c. LOC Commands 0-->Performs both tasks correctly   2.   Best Gaze 0-->Normal   3.   Visual 0-->No visual loss   4.   Facial Palsy 1-->Minor paralysis (flattened nasolabial fold, asymmetry on smiling)   5a. Motor Arm, Left 0-->No drift, limb holds 90 (or 45)  degrees for full 10 secs   5b. Motor Arm, Right 0-->No drift, limb holds 90 (or 45) degrees for full 10 secs   6a. Motor Leg, Left 1-->Drift, leg falls by the end of the 5-sec period but does not hit bed   6b. Motor Leg, right 1-->Drift, leg falls by the end of the 5-sec period but does not hit bed   7.   Limb Ataxia 0-->Absent   8.   Sensory 0-->Normal, no sensory loss   9.   Best Language 0-->No aphasia, normal   10. Dysarthria 0-->Normal   11. Extinction and Inattention  0-->No abnormality   Total 4 (11/08/22 1300)       Imaging  I personally reviewed all imaging; relevant findings per HPI.    Labs Data   CBC  Recent Labs   Lab 11/08/22 0302 11/07/22  1751   WBC 14.3* 17.4*   RBC 3.32* 3.13*   HGB 10.5* 10.0*   HCT 31.4* 29.5*    165     Basic Metabolic Panel   Recent Labs   Lab 11/08/22  1146 11/08/22  0827 11/07/22  1751   NA  --   --  135*   POTASSIUM  --   --  3.9   CHLORIDE  --   --  102   CO2  --   --  22   BUN  --   --  21.4   CR  --   --  1.02   * 108* 128*   GENESIS  --   --  7.9*     Liver Panel  Recent Labs   Lab 11/07/22  1751   PROTTOTAL 5.8*   ALBUMIN 3.2*   BILITOTAL 1.1   ALKPHOS 72   AST 27   ALT 16     INR    Recent Labs   Lab Test 11/07/22  1751   INR 1.33*      Lipid Profile    Recent Labs   Lab Test 11/08/22  0302   CHOL 135   HDL 53   LDL 69   TRIG 66     A1C    Recent Labs   Lab Test 11/08/22  0302   A1C 5.0     Troponin    Recent Labs   Lab 11/08/22  0910 11/08/22  0536 11/08/22  0302   CTROPT 298* 366* 374*          Stroke Consult Data Data   Telestroke Service Details  (for non-emergent stroke consult with tele)  Video start time 11/08/22   1258   Video end time 11/08/22   1322   Type of service telemedicine diagnostic assessment of acute neurological changes   Reason telemedicine is appropriate patient requires assessment with a specialist for diagnosis and treatment of neurological symptoms   Mode of transmission secure interactive audio and video communication per Olivia    Originating site (patient location) [facility]    Distant site (provider location) [facility]     I have personally spent a total of 70 minutes providing care today, time spent in reviewing medical records and reviewing tests, examining the patient and obtaining history, coordination of care, and discussion with the patient and/or family regarding diagnostic results, prognosis, symptom management, risks and benefits of management options, and development of plan of care. Greater than 50% was spent in counseling and coordination of care.

## 2022-11-08 NOTE — PROGRESS NOTES
[place] [facility] [place]    Patient admitted to room ICU 4 at approximately 0120 via cart from emergency room. Patient was accompanied by transport tech.     Verbal SBAR report received from ED RN prior to patient arrival.     Patient trasferred to bed via air shiloh. Patient alert and oriented X 1. The patient is not having any pain.  . Admission vital signs: Blood pressure 114/66, pulse 82, temperature 97.8  F (36.6  C), temperature source Oral, resp. rate 18, height 1.753 m (5' 9\"), weight 80.2 kg (176 lb 12.9 oz), SpO2 98 %. Patient was oriented to plan of care, call light, bed controls, tv, telephone, bathroom and visiting hours.     Risk Assessment    The following safety risks were identified during admission: fall. Yellow risk band applied: YES.     Skin Initial Assessment    This writer admitted this patient and completed a full skin assessment and Juan A score in the Adult PCS flowsheet. Appropriate interventions initiated as needed.     Secondary skin check completed by Santiago DAMON.    Juan A Risk Assessment  Sensory Perception: 4-->no impairment  Moisture: 3-->occasionally moist  Activity: 3-->walks occasionally  Mobility: 3-->slightly limited  Nutrition: 3-->adequate  Friction and Shear: 3-->no apparent problem  Juan A Score: 19  Mattress: [employer] (Low air loss)  Bed Frame: Standard width and length    Education    Patient has a [place] to Observation order: No  Observation education completed and documented: N/A      Christina Crowder RN    "

## 2022-11-08 NOTE — CONSULTS
Care Management Initial Consult    General Information  Assessment completed with:  Amita Verduzco  Type of CM/SW Visit: Offer D/C Planning    Primary Care Provider verified and updated as needed: Yes   Readmission within the last 30 days: no previous admission in last 30 days         Advance Care Planning: Advance Care Planning Reviewed: no concerns identified          Communication Assessment  Patient's communication style: spoken language (English or Bilingual)    Hearing Difficulty or Deaf: no   Wear Glasses or Blind: no    Cognitive  Cognitive/Neuro/Behavioral: .WDL except  Level of Consciousness: confused  Arousal Level: opens eyes spontaneously  Orientation: disoriented to, place, time, situation  Mood/Behavior: calm  Best Language: 0 - No aphasia  Speech: hesitant    Living Environment:   People in home: spouse     Current living Arrangements: house      Able to return to prior arrangements: no       Family/Social Support:  Care provided by: self  Provides care for: no one  Marital Status:   Wife, Sibling(s)  Luba       Description of Support System: Supportive, Involved    Support Assessment: Adequate family and caregiver support    Current Resources:   Patient receiving home care services: No  Community Resources: None  Equipment currently used at home: none  Supplies currently used at home: None    Employment/Financial:  Employment Status: retired        Financial Concerns: No concerns identified           Lifestyle & Psychosocial Needs:  Social Determinants of Health     Tobacco Use: Not on file   Alcohol Use: Not on file   Financial Resource Strain: Not on file   Food Insecurity: Not on file   Transportation Needs: Not on file   Physical Activity: Not on file   Stress: Not on file   Social Connections: Not on file   Intimate Partner Violence: Not on file   Depression: Not on file   Housing Stability: Not on file       Functional Status:  Prior to admission patient needed assistance:   Dependent  ADLs:: Independent, Ambulation-no assistive device  Dependent IADLs:: Independent  Assesssment of Functional Status: Not at  functional baseline    Mental Health Status:  Mental Health Status: No Current Concerns       Chemical Dependency Status:  Chemical Dependency Status: No Current Concerns             Values/Beliefs:  Spiritual, Cultural Beliefs, Episcopal Practices, Values that affect care: no               Additional Information:    Spoke with the Pt's wife , Luba as the Pt was confused this morning for discharge planning.  Consulted due to a stroke.    Pt lives with Luba independently in the community in a home.  He has been  to Luba for a couple years.  At baseline he is alert/oriented.  Independent with ADLs/IADLs.  Ambulates independently.  He drives.  Pt lives in a house on [place] acres and does the yard work. He does not have children.  He has 10 siblings that are supportive.    Discussed ARU and TCU.  Referrals placed for ARU and TCU.    ARU referrals:    [facility] [phone] - Spoke with Silvana.  Pt is appropriate for ARU.  Update Silvana 11/9 regarding family support following ARU stay.    [facility] [phone] - Left [facility] P) [phone] F) [phone] - Pending    TCU referrals:    Service Provider Request Status Selected Services Address Phone Fax Patient Preferred   [facility] ()  Pending - Request Sent N/A [address] [id number] [phone] --   [facility] [address] - REFERRAL ONLY (SNF)  Pending - Request Sent N/A [id number] [facility] [id number] [phone] --   [facility] ()  Pending - Request Sent N/A [id number] [facility] [id number] [phone] --       [phone] --     Plan: Acute Rehab vs TCU      Erica Armijo RN

## 2022-11-08 NOTE — PROGRESS NOTES
Internal Medicine Progress Note     Service Date: 11/08/2022  [facility] - Admission Date: 11/7/2022     Problems:     Patient Active Problem List   Diagnosis     Ischemic stroke (H)       Assessments and Plans:     A 76 yo male with a h/o HTN and aortic stenosis who confusion with speech disturbance, and family concern that he may have left sided weakness and a left facial droop, found to have acute multifocal ischemic CVA and a-fib.    Acute, multifocal ischemic CVA 2/2 embolism related to a-fib: CT head showed b/l cerebral white matter hypodensities which may represent the sequela of chronic microvascular ischemia, but they remain age-indeterminate in the absence of prior exams for comparison and may mask a small acute infarct. CTA head neg for large vessel occlusion, high-grade stenosis, or aneurysm. CTA neck showed no e/o carotid or vertebral artery hemodynamically significant stenosis, dissection, or pseudoaneurysm. MRI brain showed numerous punctate scattered supratentorial and infratentorial acute infarcts, specifically involving the right hippocampus; this distribution is suspicious for a thromboembolic etiology; no significant mass effect or hemorrhagic conversion. CT C/A/P was obtained given 3 T sign was suspicious for  possible underlying malignancy and showed non-specific CHANDLER as below. EKG showed rate controlled a-fib. Echo showed preserved EF, mild LVH, no LV thrombus, neg bubble study, moderate to severe MR, moderate MS, severe aortic stenosis, and mild pulmonary HTN with PAP 35-45mmHg. Total cholesterol 135, TG 66 HDL 53, LDL 69, A1c 5.0%.   -Stroke neurology consult appreciated. Recommended limited permissive HTN with goal SBP <180, continue lipitor at 10mg daily, start eliquis, and no need for ASA if starting AC.   -Telemetry  -Stop ASA, start eliquis  -Cont lipitor  -Holding PTA antihypertensives to allow permissive HTN (goal SBP <180), labetalol prn  -Neuro  checks  -PT, OT, SLP following  -Will need f/u with neurology in 6-8 weeks ([phone])     A-fib, new diagnosis: Rate controlled.  -Spoke with cardiology again after echo resulted and showed MS given possible need for warfarin instead of NOAC. Per cardiology, given that the mitral valve is calcified, NOAC is fine. If this was non-calcified / rheumatic MS, then patient would need to be on warfarin.   -Started eliquis as above    Elevated troponin, likely 2/2 demand ischemia / type II NSTEMI: EKG w/o ischemic changes. Trop peaked at 374, then trended down. Denies CP or SOB, but patient is not a reliable historian given suspected expressive aphasia.   -Spoke with cardiology at the [facility] given complexity of the situation due to patient's inability to give reliable history. Cardiology agreed that no ischemic changes on EKG. Advised that this is likely demand ischemia, but recommended treating as an NSTEMI with starting a heparin gtt if there were WMA on echo or LV thrombus. If neither or these were noted, no need for heparin gtt and recommended starting eliquis as above.   -Given echo w/o WMA or LV thrombus, no indication for heparin gtt    Acute metabolic encephalopathy vs expressive aphasia (latter is suspected): Ammonia, TSH and B12 all okay. UA neg for infection. CT/MRI brain as above. CXR w/o infiltrate.  -Management CVA as above  -Monitor mental status    Mediastinal lymphadenopathy: CT C/A/P notable for several prominent to mildly enlarged nonspecific mediastinal lymph nodes.  -Will need repeat CT to re-evaluate as an o/p and if the CHANDLER persists / worsens, will need to consider biopsy    Mild hyponatremia:  -Trend na    HTN:  -Holding PTA hydrochlorothiazide, toprol, lisinopril, amlodipine to allow permissive HTN as above    Chronic anemia: H/H is somewhat below prior baseline, but no labs available since 2021 and Hb at that time was 12. No e/o active bleeding. B12 and folate both WNL. Iron studies are  "consistent with GILDA.  -Check stool guaiac  -Hold off on venofer given that this can occasionally cause significant HTN and patient has had an acute CVA as above  -Start ferrous sulfate  -Trend H/H    Aortic stenosis: Echo as above.  -Will need o/p cardiology f/u    DVT Prophylaxis: Eliquis    Code Status: Full      Subjective:      Patient reports feeling fine. Denies CP, SOB, cough, abd pain, N/V/D, urinary symptoms, rash, HA. States that he cannot recall if he has left sided weakness.     Objective:      Vitals: BP 96/61 (BP Location: Left arm)   Pulse 86   Temp 98.5  F (36.9  C) (Oral)   Resp 18   Ht 1.753 m (5' 9\")   Wt 80.2 kg (176 lb 12.9 oz)   SpO2 99%   BMI 26.11 kg/m  Temp (24hrs), Av.3  F (36.8  C), Min:97.8  F (36.6  C), Max:98.7  F (37.1  C)    Gen: Alert, unable to answer questions pertaining to orientation but not clear if this may simply be related to expressive aphasia, no acute distress  Eyes: No scleral icterus  Neck: No JVD  ENT: MMM  Heart: RRR, clear S1S2, no rubs or gallops, +systolic murmur  Lungs: CTA all fields, no wheezes, rales or rhonchi  Abdomen: Normal bowel sounds, soft, no tenderness to palpation  Extremities: No lower extremity edema  Skin: No rash  Neuro: Mild left facial droop, reduced left hand , speech is slow, unclear if patient may have an element of expressive aphasia  Psych: Appropriate affect / mood    I have reviewed all labs, imaging and other investigations.     Labs/Imaging:     Results for orders placed or performed during the hospital encounter of 22 (from the past 24 hour(s))   Glucose by meter   Result Value Ref Range    GLUCOSE BY METER POCT 123 (H) 70 - 99 mg/dL   Head CT w/o contrast    Addendum: 2022    Findings of unenhanced CT were discussed over the phone with Dr. Roldan on 2022 at 1801 CST.          Narrative    EXAM: CTA HEAD NECK W CONTRAST, CT HEAD W/O CONTRAST  LOCATION: [facility]  DATE/TIME: " 11/7/2022 5:42 PM    INDICATION: Left-sided weakness and left facial droop.  COMPARISON: None.  CONTRAST: 75 ml Isovue 370  TECHNIQUE: Head and neck CT angiogram with IV contrast. Noncontrast head CT followed by axial helical CT images of the head and neck vessels obtained during the arterial phase of intravenous contrast administration. Axial 2D reconstructed images and   multiplanar 3D MIP reconstructed images of the head and neck vessels were performed by the technologist. Dose reduction techniques were used. All stenosis measurements made according to NASCET criteria unless otherwise specified.    FINDINGS:   NONCONTRAST HEAD CT:   INTRACRANIAL CONTENTS: No acute intracranial hemorrhage, extraaxial collection, or mass effect.  Scattered hypodensities within the bilateral periventricular, deep, and subcortical cerebral white matter, nonspecific although may be related to the chronic   microvascular ischemia. These limits the assessment for a small acute infarct. No evidence of an acute large vascular distribution transcortical infarct. Encephalomalacia within the right middle frontal gyrus, consistent with a chronic infarct. Multiple   bilateral punctate gyral calcifications. Mild generalized parenchymal volume loss. No acute hydrocephalus.     VISUALIZED ORBITS/SINUSES/MASTOIDS: No acute intraorbital abnormality. No significant paranasal sinus or mastoid mucosal disease.     BONES/SOFT TISSUES: No acute abnormality. Degenerative changes of the left temporomandibular joint.    HEAD CTA:  ANTERIOR CIRCULATION: No high-grade stenosis/occlusion, aneurysm, or high flow vascular malformation. A 2 mm right posterior communicating artery origin outpouching, likely an infundibulum. Standard [place] anatomy.    POSTERIOR CIRCULATION: No high-grade stenosis/occlusion, aneurysm, or high flow vascular malformation. Dominant left and smaller right vertebral artery contribute to a normal basilar artery.     DURAL  VENOUS SINUSES: Expected enhancement of the major dural venous sinuses.    NECK CTA:  RIGHT CAROTID: No measurable stenosis or dissection.    LEFT CAROTID: No measurable stenosis or dissection.    VERTEBRAL ARTERIES: No focal high-grade stenosis or dissection. Balanced vertebral arteries.    AORTIC ARCH: Classic aortic arch anatomy with no significant stenosis at the origin of the great vessels.    NONVASCULAR STRUCTURES: A right upper lobe pulmonary cyst and a calcified granuloma.      Impression    IMPRESSION:   HEAD CT:  1.  No acute intracranial hemorrhage, extra-axial fluid collection, or mass effect.  2.  Bilateral cerebral white matter hypodensities. While they may represent the sequela of chronic microvascular ischemia, they remain age-indeterminate in the absence of prior exams for comparison and may mask a small acute infarct. An MRI of the brain   may be helpful for further evaluation, as clinically indicated.  3.  Chronic changes, as above.    HEAD CTA:   1.  No large vessel occlusion, high-grade stenosis, or aneurysm.    NECK CTA:  1.  No carotid or vertebral artery hemodynamically significant stenosis, dissection, or pseudoaneurysm.   CTA Head Neck with Contrast    Addendum: 11/7/2022    Findings of unenhanced CT were discussed over the phone with Dr. Roldan on 11/07/2022 at 1801 CST.          Narrative    EXAM: CTA HEAD NECK W CONTRAST, CT HEAD W/O CONTRAST  LOCATION: [facility]  DATE/TIME: 11/7/2022 5:42 PM    INDICATION: Left-sided weakness and left facial droop.  COMPARISON: None.  CONTRAST: 75 ml Isovue 370  TECHNIQUE: Head and neck CT angiogram with IV contrast. Noncontrast head CT followed by axial helical CT images of the head and neck vessels obtained during the arterial phase of intravenous contrast administration. Axial 2D reconstructed images and   multiplanar 3D MIP reconstructed images of the head and neck vessels were performed by the technologist. Dose  reduction techniques were used. All stenosis measurements made according to NASCET criteria unless otherwise specified.    FINDINGS:   NONCONTRAST HEAD CT:   INTRACRANIAL CONTENTS: No acute intracranial hemorrhage, extraaxial collection, or mass effect.  Scattered hypodensities within the bilateral periventricular, deep, and subcortical cerebral white matter, nonspecific although may be related to the chronic   microvascular ischemia. These limits the assessment for a small acute infarct. No evidence of an acute large vascular distribution transcortical infarct. Encephalomalacia within the right middle frontal gyrus, consistent with a chronic infarct. Multiple   bilateral punctate gyral calcifications. Mild generalized parenchymal volume loss. No acute hydrocephalus.     VISUALIZED ORBITS/SINUSES/MASTOIDS: No acute intraorbital abnormality. No significant paranasal sinus or mastoid mucosal disease.     BONES/SOFT TISSUES: No acute abnormality. Degenerative changes of the left temporomandibular joint.    HEAD CTA:  ANTERIOR CIRCULATION: No high-grade stenosis/occlusion, aneurysm, or high flow vascular malformation. A 2 mm right posterior communicating artery origin outpouching, likely an infundibulum. Standard [place] anatomy.    POSTERIOR CIRCULATION: No high-grade stenosis/occlusion, aneurysm, or high flow vascular malformation. Dominant left and smaller right vertebral artery contribute to a normal basilar artery.     DURAL VENOUS SINUSES: Expected enhancement of the major dural venous sinuses.    NECK CTA:  RIGHT CAROTID: No measurable stenosis or dissection.    LEFT CAROTID: No measurable stenosis or dissection.    VERTEBRAL ARTERIES: No focal high-grade stenosis or dissection. Balanced vertebral arteries.    AORTIC ARCH: Classic aortic arch anatomy with no significant stenosis at the origin of the great vessels.    NONVASCULAR STRUCTURES: A right upper lobe pulmonary cyst and a calcified granuloma.       Impression    IMPRESSION:   HEAD CT:  1.  No acute intracranial hemorrhage, extra-axial fluid collection, or mass effect.  2.  Bilateral cerebral white matter hypodensities. While they may represent the sequela of chronic microvascular ischemia, they remain age-indeterminate in the absence of prior exams for comparison and may mask a small acute infarct. An MRI of the brain   may be helpful for further evaluation, as clinically indicated.  3.  Chronic changes, as above.    HEAD CTA:   1.  No large vessel occlusion, high-grade stenosis, or aneurysm.    NECK CTA:  1.  No carotid or vertebral artery hemodynamically significant stenosis, dissection, or pseudoaneurysm.   CBC with Platelets & Differential    Narrative    The following orders were created for panel order CBC with Platelets & Differential.  Procedure                               Abnormality         Status                     ---------                               -----------         ------                     CBC with platelets and d...[[id number]]  Abnormal            Final result                 Please view results for these tests on the individual orders.   Basic metabolic panel   Result Value Ref Range    Sodium 135 (L) 136 - 145 mmol/L    Potassium 3.9 3.4 - 5.3 mmol/L    Chloride 102 98 - 107 mmol/L    Carbon Dioxide (CO2) 22 22 - 29 mmol/L    Anion Gap 11 7 - 15 mmol/L    Urea Nitrogen 21.4 8.0 - 23.0 mg/dL    Creatinine 1.02 0.67 - 1.17 mg/dL    Calcium 7.9 (L) 8.8 - 10.2 mg/dL    Glucose 128 (H) 70 - 99 mg/dL    GFR Estimate 75 >60 mL/min/1.73m2   INR   Result Value Ref Range    INR 1.33 (H) 0.85 - 1.15   Partial thromboplastin time   Result Value Ref Range    aPTT 31 22 - 38 Seconds   Troponin T, High Sensitivity   Result Value Ref Range    Troponin T, High Sensitivity 35 (H) <=22 ng/L   CBC with platelets and differential   Result Value Ref Range    WBC Count 17.4 (H) 4.0 - 11.0 10e3/uL    RBC Count 3.13 (L) 4.40 - 5.90 10e6/uL    Hemoglobin  10.0 (L) 13.3 - 17.7 g/dL    Hematocrit 29.5 (L) 40.0 - 53.0 %    MCV 94 78 - 100 fL    MCH 31.9 26.5 - 33.0 pg    MCHC 33.9 31.5 - 36.5 g/dL    RDW 13.2 10.0 - 15.0 %    Platelet Count 165 150 - 450 10e3/uL    % Neutrophils 88 %    % Lymphocytes 3 %    % Monocytes 9 %    % Eosinophils 0 %    % Basophils 0 %    % Immature Granulocytes 0 %    NRBCs per 100 WBC 0 <1 /100    Absolute Neutrophils 15.1 (H) 1.6 - 8.3 10e3/uL    Absolute Lymphocytes 0.6 (L) 0.8 - 5.3 10e3/uL    Absolute Monocytes 1.6 (H) 0.0 - 1.3 10e3/uL    Absolute Eosinophils 0.0 0.0 - 0.7 10e3/uL    Absolute Basophils 0.0 0.0 - 0.2 10e3/uL    Absolute Immature Granulocytes 0.1 <=0.4 10e3/uL    Absolute NRBCs 0.0 10e3/uL   Vitamin B12   Result Value Ref Range    Vitamin B12 260 232 - 1,245 pg/mL   Hepatic panel   Result Value Ref Range    Protein Total 5.8 (L) 6.4 - 8.3 g/dL    Albumin 3.2 (L) 3.5 - 5.2 g/dL    Bilirubin Total 1.1 <=1.2 mg/dL    Alkaline Phosphatase 72 40 - 129 U/L    AST 27 10 - 50 U/L    ALT 16 10 - 50 U/L    Bilirubin Direct 0.32 (H) 0.00 - 0.30 mg/dL   Alcohol level blood   Result Value Ref Range    Alcohol ethyl <0.01 (H) <=0.00 g/dL   Folate   Result Value Ref Range    Folic Acid 19.6 4.6 - 34.8 ng/mL   Ammonia (on ice)   Result Value Ref Range    Ammonia 13 (L) 16 - 60 umol/L   Troponin T, High Sensitivity   Result Value Ref Range    Troponin T, High Sensitivity 36 (H) <=22 ng/L   XR Abdomen 1 View    Narrative    EXAM: XR ABDOMEN 1 VIEW  LOCATION: [facility]  DATE/TIME: 11/7/2022 7:27 PM    INDICATION: encephalopathy Pre MRI screening  COMPARISON: None.      Impression    IMPRESSION: Negative abdomen. Bowel gas pattern is normal. Nothing for obstruction or free air. Contrast present throughout the urinary collecting system. No metallic foreign body identified.   XR Chest 1 View    Narrative    EXAM: XR CHEST 1 VIEW  LOCATION: [facility]  DATE/TIME: 11/7/2022 7:28  PM    INDICATION: encephalopathy  COMPARISON: None.      Impression    IMPRESSION: Low lung volumes. No definite focal pneumonia. Heart size likely upper limits of normal with heavy calcification of mitral annulus. Mild central hilar congestion may relate to the low lung volumes.  No metallic foreign body.   MR Brain w/o & w Contrast   Result Value Ref Range    Radiologist flags Acute infarcts (AA)     Narrative    EXAM: MR BRAIN W/O and W CONTRAST  LOCATION: [facility]  DATE/TIME: 11/7/2022 8:24 PM    INDICATION: Encephalopathy  COMPARISON:  Earlier same day CTs.  CONTRAST: Gadavist 8 mL  TECHNIQUE: Routine multiplanar multisequence head MRI without and with intravenous contrast.    FINDINGS:  INTRACRANIAL CONTENTS: Numerous punctate scattered bilateral supratentorial and infratentorial acute infarcts, specifically involving the right hippocampus (series 3.2, image 13). No mass, acute hemorrhage, or extra-axial fluid collections. Scattered   nonspecific T2/FLAIR hyperintensities within the cerebral white matter most consistent with mild chronic microvascular ischemic change. Mild generalized cerebral atrophy. No hydrocephalus. Normal position of the cerebellar tonsils. No pathologic contrast   enhancement.    SELLA: No abnormality accounting for technique.    OSSEOUS STRUCTURES/SOFT TISSUES: Normal marrow signal. The major intracranial vascular flow voids are maintained.     ORBITS: No abnormality accounting for technique.     SINUSES/MASTOIDS: No paranasal sinus mucosal disease. No middle ear or mastoid effusion.       Impression    IMPRESSION:  1.  Numerous punctate scattered supratentorial and infratentorial acute infarcts, specifically involving the right hippocampus. This distribution is suspicious for a thromboembolic etiology. No significant mass effect or hemorrhagic conversion.       [Critical Result: Acute infarcts]    Finding was identified on 11/7/2022 8:24 PM.       Nick Roldan was contacted by me on 11/7/2022 8:33 PM and verbalized understanding of the critical result.    Asymptomatic COVID-19 Virus (Coronavirus) by PCR Nasopharyngeal    Specimen: Nasopharyngeal; Swab   Result Value Ref Range    SARS CoV2 PCR Negative Negative    Narrative    Testing was performed using the Xpert Xpress SARS-CoV-2 Assay on the  Cepheid Gene-Xpert Instrument Systems. Additional information about  this Emergency Use Authorization (EUA) assay can be found via the Lab  Guide. This test should be ordered for the detection of SARS-CoV-2 in  individuals who meet SARS-CoV-2 clinical and/or epidemiological  criteria. Test performance is unknown in asymptomatic patients. This  test is for in vitro diagnostic use under the FDA EUA for  laboratories certified under CLIA to perform high complexity testing.  This test has not been FDA cleared or approved. A negative result  does not rule out the presence of PCR inhibitors in the specimen or  target RNA in concentration below the limit of detection for the  assay. The possibility of a false negative should be considered if  the patient's recent exposure or clinical presentation suggests  COVID-19. This test was validated by the [facility]. This laboratory is certified under  the Clinical Laboratory Improvement Amendments of 1988 (CLIA-88) as  qualified to perform high complexity laboratory testing.     CBC with Platelets & Differential    Narrative    The following orders were created for panel order CBC with Platelets & Differential.  Procedure                               Abnormality         Status                     ---------                               -----------         ------                     CBC with platelets and d...[[id number]]  Abnormal            Final result                 Please view results for these tests on the individual orders.   Troponin T, High Sensitivity   Result Value Ref  Range    Troponin T, High Sensitivity 374 (HH) <=22 ng/L   Hemoglobin A1c   Result Value Ref Range    Hemoglobin A1C 5.0 <5.7 %   Lipid panel reflex to direct LDL: Non-fasting   Result Value Ref Range    Cholesterol 135 <200 mg/dL    Triglycerides 66 <150 mg/dL    Direct Measure HDL 53 >=40 mg/dL    LDL Cholesterol Calculated 69 <=100 mg/dL    Non HDL Cholesterol 82 <130 mg/dL    Narrative    Cholesterol  Desirable:  <200 mg/dL    Triglycerides  Normal:  Less than 150 mg/dL  Borderline High:  150-199 mg/dL  High:  200-499 mg/dL  Very High:  Greater than or equal to 500 mg/dL    Direct Measure HDL  Female:  Greater than or equal to 50 mg/dL   Male:  Greater than or equal to 40 mg/dL    LDL Cholesterol  Desirable:  <100mg/dL  Above Desirable:  100-129 mg/dL   Borderline High:  130-159 mg/dL   High:  160-189 mg/dL   Very High:  >= 190 mg/dL    Non HDL Cholesterol  Desirable:  130 mg/dL  Above Desirable:  130-159 mg/dL  Borderline High:  160-189 mg/dL  High:  190-219 mg/dL  Very High:  Greater than or equal to 220 mg/dL   TSH   Result Value Ref Range    TSH 0.87 0.30 - 4.20 uIU/mL   Iron and iron binding capacity   Result Value Ref Range    Iron 14 (L) 61 - 157 ug/dL    Iron Sat Index 7 (L) 15 - 46 %    Iron Binding Capacity 204 (L) 240 - 430 ug/dL   Ferritin   Result Value Ref Range    Ferritin 278 31 - 409 ng/mL   CBC with platelets and differential   Result Value Ref Range    WBC Count 14.3 (H) 4.0 - 11.0 10e3/uL    RBC Count 3.32 (L) 4.40 - 5.90 10e6/uL    Hemoglobin 10.5 (L) 13.3 - 17.7 g/dL    Hematocrit 31.4 (L) 40.0 - 53.0 %    MCV 95 78 - 100 fL    MCH 31.6 26.5 - 33.0 pg    MCHC 33.4 31.5 - 36.5 g/dL    RDW 13.3 10.0 - 15.0 %    Platelet Count 170 150 - 450 10e3/uL    % Neutrophils 73 %    % Lymphocytes 16 %    % Monocytes 11 %    % Eosinophils 0 %    % Basophils 0 %    % Immature Granulocytes 0 %    NRBCs per 100 WBC 0 <1 /100    Absolute Neutrophils 10.4 (H) 1.6 - 8.3 10e3/uL    Absolute Lymphocytes 2.3 0.8  - 5.3 10e3/uL    Absolute Monocytes 1.5 (H) 0.0 - 1.3 10e3/uL    Absolute Eosinophils 0.0 0.0 - 0.7 10e3/uL    Absolute Basophils 0.0 0.0 - 0.2 10e3/uL    Absolute Immature Granulocytes 0.1 <=0.4 10e3/uL    Absolute NRBCs 0.0 10e3/uL   Troponin T, High Sensitivity   Result Value Ref Range    Troponin T, High Sensitivity 366 (HH) <=22 ng/L   UA with Microscopic reflex to Culture    Specimen: Urine, Ortega Catheter   Result Value Ref Range    Color Urine Yellow Colorless, Straw, Light Yellow, Yellow    Appearance Urine Clear Clear    Glucose Urine Negative Negative mg/dL    Bilirubin Urine Small (A) Negative    Ketones Urine 15 (A) Negative mg/dL    Specific Gravity Urine 1.025 1.003 - 1.035    Blood Urine Trace (A) Negative    pH Urine 5.0 5.0 - 7.0    Protein Albumin Urine 30 (A) Negative mg/dL    Urobilinogen Urine Normal Normal, 2.0 mg/dL    Nitrite Urine Negative Negative    Leukocyte Esterase Urine Negative Negative    Mucus Urine Present (A) None Seen /LPF    RBC Urine 7 (H) <=2 /HPF    WBC Urine 3 <=5 /HPF    Narrative    Urine Culture not indicated   Glucose by meter   Result Value Ref Range    GLUCOSE BY METER POCT 108 (H) 70 - 99 mg/dL   Troponin T, High Sensitivity   Result Value Ref Range    Troponin T, High Sensitivity 298 (HH) <=22 ng/L   CT Chest/Abdomen/Pelvis w Contrast    Narrative    CT CHEST/ABDOMEN/PELVIS WITH CONTRAST 11/8/2022 9:45 AM    CLINICAL HISTORY: Evaluate malignancy; patient with thromboembolic  CVA.    TECHNIQUE: CT scan of the chest, abdomen, and pelvis was performed  following injection of IV contrast. Multiplanar reformats were  obtained. Dose reduction techniques were used.     CONTRAST: 78 mL Isovue 370    COMPARISON: None.    FINDINGS:   LUNGS AND PLEURA: Evaluation of the lung parenchyma is limited by  respiratory motion artifact. A benign densely calcified subpleural  granuloma is seen in the posterior aspect of the right upper lobe. No  suspicious appearing pulmonary nodules  or masses. No airspace  consolidation or pleural fluid. Mild bibasilar atelectasis. Central  airways are patent.    MEDIASTINUM/AXILLAE: There are several prominent and mildly enlarged  mediastinal lymph nodes present. Largest example is seen in the  precarinal region measuring 15 mm in short axis (2-45). No enlarged  axillary or hilar lymph nodes. Heart size is mildly enlarged. No  pericardial effusion. Thoracic aorta is normal in course and caliber.  Mild to moderate thoracic aortic atherosclerosis is present. Moderate  three-vessel coronary artery atherosclerosis is seen. Coarse  calcifications of the mitral annulus are noted.    HEPATOBILIARY: Peripherally calcified gallstones are seen within the  gallbladder lumen. No gallbladder wall thickening or pericholecystic  fluid. No bile duct dilation. Nonmass-like low density is seen in the  anterior aspect of the liver near the ligament of teres, favoring  fatty infiltration. No follow-up is necessary. Liver contour is  smooth.    PANCREAS: Normal.    SPLEEN: Normal.    ADRENAL GLANDS: Normal.    KIDNEYS/BLADDER: Low-attenuation subcentimeter renal lesion(s). These  are compatible with small benign cysts and no specific imaging  evaluation or follow-up is recommended. No nephrolithiasis or  hydronephrosis. Urinary bladder is partially decompressed. There is  mild urinary bladder wall thickening. A couple tiny foci of gas are  seen in the urinary bladder lumen.    BOWEL: Colonic diverticulosis is present without findings of  diverticulitis. Large and small bowel loops are nonobstructed and  noninflamed. Appendix is normal-appearing.    PELVIC ORGANS: There is trace nonspecific free pelvic fluid present.  No cystic or solid pelvic mass.    ADDITIONAL FINDINGS: Moderate to severe atherosclerosis of the  abdominal aorta and iliac arteries is present. No aneurysmal dilation.  Small fat-containing noninflamed right inguinal hernia. No enlarged  abdominal or pelvic lymph  nodes.    MUSCULOSKELETAL: Multilevel hypertrophic and degenerative changes of  the spine are present. There is mild superior endplate compression  deformity of the T9 vertebral body. Several remote, healed left  anterior rib fractures. No acute osseous abnormality.      Impression    IMPRESSION:  1.  Several prominent to mildly enlarged nonspecific mediastinal lymph  nodes.  2.  Atherosclerotic vascular disease, including at least moderate  coronary artery atherosclerosis.  3.  Cholelithiasis without evidence of acute cholecystitis.  4.  Colonic diverticulosis without signs of diverticulitis. Mild  circumferential urinary bladder wall thickening and tiny foci of gas  in the urinary bladder lumen, which can be seen with cystitis.  Consider correlation with urinalysis if clinically indicated.  5.  Nonspecific trace free pelvic fluid.    DULCE POLANCO MD         SYSTEM ID:  [id number]   Glucose by meter   Result Value Ref Range    GLUCOSE BY METER POCT 128 (H) 70 - 99 mg/dL   Echocardiogram Complete   Result Value Ref Range    LVEF  65-70%     Narrative    [id number]  [id number]  [id number]  [id number]^GAGE^HARRY^ARGENIS     [facility]  Echocardiography Laboratory  [address] [place] [address]     Name: RADHIKA OSULLIVAN  MRN: [id number]  : 1945  Study Date: 2022 02:13 PM  Age: 77 yrs  Gender: Male  Patient Location: [facility]  Reason For Study: CVA  Ordering Physician: HARRY ALMONTE  Referring Physician: Saleem Jones  Performed By: Jodi Long RDCS     BSA: 2.0 m2  Height: 69 in  Weight: 176 lb  HR: 81  BP: 112/60 mmHg  ______________________________________________________________________________  Procedure  Complete Portable Bubble Echo Adult. Optison (NDC #[id number]) given  intravenously.  ______________________________________________________________________________  Interpretation Summary     There is mild concentric left ventricular hypertrophy.  There is no thrombus seen in  the left ventricle.  The left atrium is moderately dilated.  A contrast injection (Bubble Study) was performed that was negative for flow  across the interatrial septum.  The mitral valve leaflets are moderately thickened.  There is severe mitral annular calcification.  There is moderate to mod-severe (2-3+) mitral regurgitation.  There is moderate mitral stenosis.  The mean mitral valve gradient is (at afib HR 85) 9-11mmHg.  Mild (35-45mmHg) pulmonary hypertension is present.  Severe valvular aortic stenosis.  ______________________________________________________________________________  Left Ventricle  The left ventricle is normal in size. There is mild concentric left  ventricular hypertrophy. The visual ejection fraction is 65-70%. Diastolic  function not assessed due to atrial fibrillation. Normal left ventricular wall  motion. There is no thrombus seen in the left ventricle.     Right Ventricle  The right ventricle is normal in size and function.     Atria  The left atrium is moderately dilated. Right atrial size is normal. A contrast  injection (Bubble Study) was performed that was negative for flow across the  interatrial septum.     Mitral Valve  The mitral valve leaflets are moderately thickened. There is severe mitral  annular calcification. There is moderate to mod-severe (2-3+) mitral  regurgitation. There is moderate mitral stenosis. The mean mitral valve  gradient is (at afib HR 85) 9-11mmHg.     Tricuspid Valve  There is trace to mild tricuspid regurgitation. The right ventricular systolic  pressure is approximated at 28.7 mmHg plus the right atrial pressure. Mild  (35-45mmHg) pulmonary hypertension is present. IVC diameter >2.1 cm collapsing  <50% with sniff suggests a high RA pressure estimated at 15 mmHg or greater.     Aortic Valve  The aortic valve is not well visualized. Severe valvular aortic stenosis. The  mean AoV pressure gradient is 43.9 mmHg.     Pulmonic Valve  The pulmonic valve is not  well seen, but is grossly normal.     Vessels  The aortic root is not well visualized.     Pericardium  The pericardium appears normal.     Rhythm  The rhythm was atrial fibrillation with controlled ventricular rate at rest.  ______________________________________________________________________________  MMode/2D Measurements & Calculations  IVSd: 1.1 cm     LVIDd: 4.1 cm  LVIDs: 2.8 cm  LVPWd: 1.3 cm  FS: 31.6 %  LV mass(C)d: 172.0 grams  LV mass(C)dI: 87.9 grams/m2  Ao root diam: 3.7 cm  LA dimension: 4.3 cm  asc Aorta Diam: 3.4 cm  LA/Ao: 1.2  LVOT diam: 2.2 cm  LVOT area: 3.7 cm2  LA Volume (BP): 103.0 ml  LA Volume Index (BP): 52.6 ml/m2  RWT: 0.64     Doppler Measurements & Calculations  MV max P.6 mmHg  MV mean PG: 10.5 mmHg  MV V2 VTI: 58.3 cm  MVA(VTI): 1.1 cm2  Ao V2 max: 436.4 cm/sec  Ao max P.0 mmHg  Ao V2 mean: 306.3 cm/sec  Ao mean P.9 mmHg  Ao V2 VTI: 74.0 cm  KARAN(I,D): 0.89 cm2  KARAN(V,D): 0.85 cm2  LV V1 max P.1 mmHg  LV V1 max: 100.6 cm/sec  LV V1 VTI: 17.8 cm  SV(LVOT): 66.0 ml  SI(LVOT): 33.7 ml/m2  TR max naeem: 267.6 cm/sec  TR max P.7 mmHg  AV Naeem Ratio (DI): 0.23  KARAN Index (cm2/m2): 0.46     ______________________________________________________________________________  Report approved by: Zita Chino 2022 03:23 PM               Eden Jordan MD  2022 4:46 PM

## 2022-11-08 NOTE — H&P
"[facility]    History and Physical - Hospitalist Service       Date of Admission:  11/7/2022    Assessment & Plan    Acute bilateral CVA involving multiple territories  -concerning for thromboembolic process, possible occult malignancy  -ASA 325mg daily, Lipitor 40mg daily for now  -Echo pending  -PT/OT/SLP  -A1c, TSH, lipid panel pending  -CT chest/abd/pelvis to eval occult malignacy  -Neuro consult    HTN  -cont home meds    -aortic stenosis  -Echo pending    Leukocytosis  -UA ordered; no other obvious infection source  -repeat in AM    Anemia  -b12, folate, iron panel, ferritin  -repeat in AM; no bleeding    Elevated trop  -trop 35-->36-->374-->366   -no chest pain  -EKG without ischemic change  -cont ASA and statin; avoid IV heparin for now in setting of acute CVAs pending further discussion with Neuro  -Echo pending      Diet: NPO for Medical/Clinical Reasons Except for: Meds    DVT Prophylaxis: scds  Ortega Catheter: Not present  Central Lines: None  Code Status:  full    Clinically Significant Risk Factors Present on Admission     # Coagulation Defect: INR = 1.33 (Ref range: 0.85 - 1.15) and/or PTT = 31 Seconds (Ref range: 22 - 38 Seconds), will monitor for bleeding    # Hypertension: home medication list includes antihypertensive(s)     # Overweight: Estimated body mass index is 26.11 kg/m  as calculated from the following:    Height as of this encounter: 1.753 m (5' 9\").    Weight as of this encounter: 80.2 kg (176 lb 12.9 oz).           Disposition Plan      Expected Discharge Date: 11/09/2022                The patient's care was discussed with the stephen CASTAÑEDA MD  [facility]  Securely message with the Vocera Web Console (learn more here)  Text page via [facility] Paging/Directory      Visit/Communication Style   Virtual (Video) communication was used to evaluate Leon.  Leon consented to the use of video communication: yes  Video " START time: 0230, 11/8/2022  Video STOP time: 0245, 11/8/2022   Patient's location: [facility]   Provider's location during the visit: [facility] Tele-medicine site        ______________________________________________________________________    Chief Complaint   Left-side weakness    History of Present Illness    The history is obtained from the chart.  The patient cannot provide much history.  Unclear if this is his baseline.    77yoM with HTN and aortic stenosis presented to the ED with left-side weakness, left facial droop and difficulty speaking which his wife first noticed around 2pm on 11/7.    He current has no complaints.    Review of Systems    Cannot obtain    Past Medical History    HTN  Aortic stenosis    Past Surgical History   Cannot obtain    Social History   Former smoker, quit 1990    Family History   Cannot obtain    Prior to Admission Medications   Prior to Admission Medications   Prescriptions Last Dose Informant Patient Reported? Taking?   B Complex-C (SUPER B COMPLEX PO) Unknown Spouse/Significant Other, Self Yes Yes   Sig: Take 1 tablet by mouth daily   VITAMIN K PO Unknown Spouse/Significant Other, Self Yes Yes   Sig: Take 1 tablet by mouth daily   amLODIPine (NORVASC) 10 MG tablet 11/7/2022 at am Self, Spouse/Significant Other Yes Yes   Sig: Take 1 tablet by mouth daily   hydrochlorothiazide (HYDRODIURIL) 12.5 MG tablet 11/7/2022 at am Self, Spouse/Significant Other Yes Yes   Sig: Take 1 tablet by mouth daily   lisinopril (ZESTRIL) 40 MG tablet 11/7/2022 at am Self, Spouse/Significant Other Yes Yes   Sig: Take 1 tablet by mouth daily   metoprolol succinate ER (TOPROL XL) 100 MG 24 hr tablet 11/7/2022 at am Self, Spouse/Significant Other Yes Yes   Sig: Take 1 tablet by mouth daily   sildenafil (VIAGRA) 100 MG tablet 11/6/2022 Self, Spouse/Significant Other Yes Yes   Sig: Take 50 mg by mouth daily as needed      Facility-Administered Medications: None      Allergies   No Known Allergies    Physical Exam   Vital Signs: Temp: 97.8  F (36.6  C) Temp src: Oral BP: 114/61 Pulse: 71   Resp: 18 SpO2: 99 % O2 Device: None (Room air)    Weight: 176 lbs 12.94 oz    Gen:   in no acute distress, lying semi-supine in hospital stretcher  HEENT:  Anicteric sclera, PER, hard of hearing   Resp:  No accessory muscle use, breath sounds clear; no wheezes no rales no rhonchi  Card:  Systolic murmur at RUSB   Abd:  Soft per RN exam, no TTP, non-distended, normoactive bowel sounds are present  Musc:  Normal strength and movement of the major muscle groups without obvious deformity  Psych:  Oriented to self only, not anxious, not agitated  Neuro:no facial droop, equal strength bilaterally per RN; answers questions with few words only    Data     Recent Labs   Lab 11/07/22  1751 11/07/22  1741   WBC 17.4*  --    HGB 10.0*  --    MCV 94  --      --    INR 1.33*  --    *  --    POTASSIUM 3.9  --    CHLORIDE 102  --    CO2 22  --    BUN 21.4  --    CR 1.02  --    ANIONGAP 11  --    GENESIS 7.9*  --    * 123*   ALBUMIN 3.2*  --    PROTTOTAL 5.8*  --    BILITOTAL 1.1  --    ALKPHOS 72  --    ALT 16  --    AST 27  --          Recent Results (from the past 24 hour(s))   Head CT w/o contrast    Addendum: 11/7/2022    Findings of unenhanced CT were discussed over the phone with Dr. Roldan on 11/07/2022 at 1801 CST.          Narrative    EXAM: CTA HEAD NECK W CONTRAST, CT HEAD W/O CONTRAST  LOCATION: [facility]  DATE/TIME: 11/7/2022 5:42 PM    INDICATION: Left-sided weakness and left facial droop.  COMPARISON: None.  CONTRAST: 75 ml Isovue 370  TECHNIQUE: Head and neck CT angiogram with IV contrast. Noncontrast head CT followed by axial helical CT images of the head and neck vessels obtained during the arterial phase of intravenous contrast administration. Axial 2D reconstructed images and   multiplanar 3D MIP reconstructed images of the head and neck  vessels were performed by the technologist. Dose reduction techniques were used. All stenosis measurements made according to NASCET criteria unless otherwise specified.    FINDINGS:   NONCONTRAST HEAD CT:   INTRACRANIAL CONTENTS: No acute intracranial hemorrhage, extraaxial collection, or mass effect.  Scattered hypodensities within the bilateral periventricular, deep, and subcortical cerebral white matter, nonspecific although may be related to the chronic   microvascular ischemia. These limits the assessment for a small acute infarct. No evidence of an acute large vascular distribution transcortical infarct. Encephalomalacia within the right middle frontal gyrus, consistent with a chronic infarct. Multiple   bilateral punctate gyral calcifications. Mild generalized parenchymal volume loss. No acute hydrocephalus.     VISUALIZED ORBITS/SINUSES/MASTOIDS: No acute intraorbital abnormality. No significant paranasal sinus or mastoid mucosal disease.     BONES/SOFT TISSUES: No acute abnormality. Degenerative changes of the left temporomandibular joint.    HEAD CTA:  ANTERIOR CIRCULATION: No high-grade stenosis/occlusion, aneurysm, or high flow vascular malformation. A 2 mm right posterior communicating artery origin outpouching, likely an infundibulum. Standard [place] anatomy.    POSTERIOR CIRCULATION: No high-grade stenosis/occlusion, aneurysm, or high flow vascular malformation. Dominant left and smaller right vertebral artery contribute to a normal basilar artery.     DURAL VENOUS SINUSES: Expected enhancement of the major dural venous sinuses.    NECK CTA:  RIGHT CAROTID: No measurable stenosis or dissection.    LEFT CAROTID: No measurable stenosis or dissection.    VERTEBRAL ARTERIES: No focal high-grade stenosis or dissection. Balanced vertebral arteries.    AORTIC ARCH: Classic aortic arch anatomy with no significant stenosis at the origin of the great vessels.    NONVASCULAR STRUCTURES: A right upper  lobe pulmonary cyst and a calcified granuloma.      Impression    IMPRESSION:   HEAD CT:  1.  No acute intracranial hemorrhage, extra-axial fluid collection, or mass effect.  2.  Bilateral cerebral white matter hypodensities. While they may represent the sequela of chronic microvascular ischemia, they remain age-indeterminate in the absence of prior exams for comparison and may mask a small acute infarct. An MRI of the brain   may be helpful for further evaluation, as clinically indicated.  3.  Chronic changes, as above.    HEAD CTA:   1.  No large vessel occlusion, high-grade stenosis, or aneurysm.    NECK CTA:  1.  No carotid or vertebral artery hemodynamically significant stenosis, dissection, or pseudoaneurysm.   CTA Head Neck with Contrast    Addendum: 11/7/2022    Findings of unenhanced CT were discussed over the phone with Dr. Roldan on 11/07/2022 at 1801 CST.          Narrative    EXAM: CTA HEAD NECK W CONTRAST, CT HEAD W/O CONTRAST  LOCATION: [facility]  DATE/TIME: 11/7/2022 5:42 PM    INDICATION: Left-sided weakness and left facial droop.  COMPARISON: None.  CONTRAST: 75 ml Isovue 370  TECHNIQUE: Head and neck CT angiogram with IV contrast. Noncontrast head CT followed by axial helical CT images of the head and neck vessels obtained during the arterial phase of intravenous contrast administration. Axial 2D reconstructed images and   multiplanar 3D MIP reconstructed images of the head and neck vessels were performed by the technologist. Dose reduction techniques were used. All stenosis measurements made according to NASCET criteria unless otherwise specified.    FINDINGS:   NONCONTRAST HEAD CT:   INTRACRANIAL CONTENTS: No acute intracranial hemorrhage, extraaxial collection, or mass effect.  Scattered hypodensities within the bilateral periventricular, deep, and subcortical cerebral white matter, nonspecific although may be related to the chronic   microvascular ischemia. These  limits the assessment for a small acute infarct. No evidence of an acute large vascular distribution transcortical infarct. Encephalomalacia within the right middle frontal gyrus, consistent with a chronic infarct. Multiple   bilateral punctate gyral calcifications. Mild generalized parenchymal volume loss. No acute hydrocephalus.     VISUALIZED ORBITS/SINUSES/MASTOIDS: No acute intraorbital abnormality. No significant paranasal sinus or mastoid mucosal disease.     BONES/SOFT TISSUES: No acute abnormality. Degenerative changes of the left temporomandibular joint.    HEAD CTA:  ANTERIOR CIRCULATION: No high-grade stenosis/occlusion, aneurysm, or high flow vascular malformation. A 2 mm right posterior communicating artery origin outpouching, likely an infundibulum. Standard [place] anatomy.    POSTERIOR CIRCULATION: No high-grade stenosis/occlusion, aneurysm, or high flow vascular malformation. Dominant left and smaller right vertebral artery contribute to a normal basilar artery.     DURAL VENOUS SINUSES: Expected enhancement of the major dural venous sinuses.    NECK CTA:  RIGHT CAROTID: No measurable stenosis or dissection.    LEFT CAROTID: No measurable stenosis or dissection.    VERTEBRAL ARTERIES: No focal high-grade stenosis or dissection. Balanced vertebral arteries.    AORTIC ARCH: Classic aortic arch anatomy with no significant stenosis at the origin of the great vessels.    NONVASCULAR STRUCTURES: A right upper lobe pulmonary cyst and a calcified granuloma.      Impression    IMPRESSION:   HEAD CT:  1.  No acute intracranial hemorrhage, extra-axial fluid collection, or mass effect.  2.  Bilateral cerebral white matter hypodensities. While they may represent the sequela of chronic microvascular ischemia, they remain age-indeterminate in the absence of prior exams for comparison and may mask a small acute infarct. An MRI of the brain   may be helpful for further evaluation, as clinically  indicated.  3.  Chronic changes, as above.    HEAD CTA:   1.  No large vessel occlusion, high-grade stenosis, or aneurysm.    NECK CTA:  1.  No carotid or vertebral artery hemodynamically significant stenosis, dissection, or pseudoaneurysm.   XR Abdomen 1 View    Narrative    EXAM: XR ABDOMEN 1 VIEW  LOCATION: [facility]  DATE/TIME: 11/7/2022 7:27 PM    INDICATION: encephalopathy Pre MRI screening  COMPARISON: None.      Impression    IMPRESSION: Negative abdomen. Bowel gas pattern is normal. Nothing for obstruction or free air. Contrast present throughout the urinary collecting system. No metallic foreign body identified.   XR Chest 1 View    Narrative    EXAM: XR CHEST 1 VIEW  LOCATION: [facility]  DATE/TIME: 11/7/2022 7:28 PM    INDICATION: encephalopathy  COMPARISON: None.      Impression    IMPRESSION: Low lung volumes. No definite focal pneumonia. Heart size likely upper limits of normal with heavy calcification of mitral annulus. Mild central hilar congestion may relate to the low lung volumes.  No metallic foreign body.   MR Brain w/o & w Contrast   Result Value    Radiologist flags Acute infarcts (AA)    Narrative    EXAM: MR BRAIN W/O and W CONTRAST  LOCATION: [facility]  DATE/TIME: 11/7/2022 8:24 PM    INDICATION: Encephalopathy  COMPARISON:  Earlier same day CTs.  CONTRAST: Gadavist 8 mL  TECHNIQUE: Routine multiplanar multisequence head MRI without and with intravenous contrast.    FINDINGS:  INTRACRANIAL CONTENTS: Numerous punctate scattered bilateral supratentorial and infratentorial acute infarcts, specifically involving the right hippocampus (series 3.2, image 13). No mass, acute hemorrhage, or extra-axial fluid collections. Scattered   nonspecific T2/FLAIR hyperintensities within the cerebral white matter most consistent with mild chronic microvascular ischemic change. Mild generalized cerebral atrophy. No  hydrocephalus. Normal position of the cerebellar tonsils. No pathologic contrast   enhancement.    SELLA: No abnormality accounting for technique.    OSSEOUS STRUCTURES/SOFT TISSUES: Normal marrow signal. The major intracranial vascular flow voids are maintained.     ORBITS: No abnormality accounting for technique.     SINUSES/MASTOIDS: No paranasal sinus mucosal disease. No middle ear or mastoid effusion.       Impression    IMPRESSION:  1.  Numerous punctate scattered supratentorial and infratentorial acute infarcts, specifically involving the right hippocampus. This distribution is suspicious for a thromboembolic etiology. No significant mass effect or hemorrhagic conversion.       [Critical Result: Acute infarcts]    Finding was identified on 11/7/2022 8:24 PM.     Dr. Nick Roldan was contacted by me on 11/7/2022 8:33 PM and verbalized understanding of the critical result.

## 2022-11-09 NOTE — PLAN OF CARE
Assumed care [id number]  ]Neuro: A&Ox1. Only orientated to self  Cardiac: Afib. VSS. No PRN's needed for blood pressure, echo completed this shift.   Respiratory: Sating adequately on RA.  GI/: Pt unable to urinate throughout the day, straight cathed this evening.  Diet/appetite: Speech visited pt this AM, pt tolerating regular diet.  Activity:  Assist of 1, up to chair with assist of 1, gait belt and walker.  Pain: At acceptable level on current regimen.   Skin: No new deficits noted.  LDA's: PIV    Plan: Continue with POC. Notify primary team with changes.

## 2022-11-09 NOTE — PHARMACY-CONSULT NOTE
Pharmacy Consult to evaluate for medication related stroke core measures    Leon Alvarado, 77 year old male admitted with ischemic stroke on 11/7/2022.    Thrombolytic was not given because of Time from onset contraindications    VTE Prophylaxis Eliquis given on 11/8/22 as appropriate prior to end of hospital day 2.    Antithrombotic: apixaban started on 11/8/22, as appropriate by end of hospital day 2. Continue antithrombotic therapy on discharge to meet quality measures, unless contraindicated.    Anticoagulation if history of A-fib/flutter: Patient on apixaban (Eliquis); continue anticoagulation on discharge to meet quality measures, unless contraindicated.    LDL Cholesterol Calculated   Date Value Ref Range Status   11/08/2022 69 <=100 mg/dL Final       Patient currently receiving Lipitor (atorvastatin) continue statin on discharge to meet quality measures, unless contraindicated.    Recommendations: None at this time    Thank you for the consult.    Paramjit Rojas [facility] 11/9/2022 3:26 PM

## 2022-11-09 NOTE — PROGRESS NOTES
Patient alert and oriented x1. Pleasant and cooperative with staff. Orders placed for foly catheter. Ortega in place and collecting dark concentrated urine. Patient had difficult time falling asleep. PRN melatonin effective. Attempted to get out of bed x1 during NOC. Bed alarm on for safety.    no tenderness

## 2022-11-09 NOTE — PROGRESS NOTES
Spoke with wife Luba who called for update. She wanted to know what discharge plan was and place he will be going. Advised she will be notified of decision as of time we spoke it was pending. Transferred her into patient room to talk.    Perlita Starkey RN on 11/9/2022 at 9:39 AM

## 2022-11-09 NOTE — PROGRESS NOTES
Internal Medicine Progress Note     Service Date: 11/09/2022  [facility] - Admission Date: 11/7/2022     Problems:     Patient Active Problem List   Diagnosis     Ischemic stroke (H)       Assessments and Plans:     A 76 yo male with a h/o HTN and aortic stenosis who confusion with speech disturbance, and family concern that he may have left sided weakness and a left facial droop, found to have acute multifocal ischemic CVA and a-fib.    Acute, multifocal ischemic CVA 2/2 embolism related to a-fib: CT head showed b/l cerebral white matter hypodensities which may represent the sequela of chronic microvascular ischemia, but they remain age-indeterminate in the absence of prior exams for comparison and may mask a small acute infarct. CTA head neg for large vessel occlusion, high-grade stenosis, or aneurysm. CTA neck showed no e/o carotid or vertebral artery hemodynamically significant stenosis, dissection, or pseudoaneurysm. MRI brain showed numerous punctate scattered supratentorial and infratentorial acute infarcts, specifically involving the right hippocampus; this distribution is suspicious for a thromboembolic etiology; no significant mass effect or hemorrhagic conversion. CT C/A/P was obtained given 3 T sign was suspicious for  possible underlying malignancy and showed non-specific CHANDLER as below. EKG showed rate controlled a-fib. Echo showed preserved EF, mild LVH, no LV thrombus, neg bubble study, moderate to severe MR, moderate MS, severe aortic stenosis, and mild pulmonary HTN with PAP 35-45mmHg. Total cholesterol 135, TG 66 HDL 53, LDL 69, A1c 5.0%.   -Stroke neurology consult appreciated. Recommended limited permissive HTN with goal SBP <180, continue lipitor at 10mg daily, starting eliquis, and no need for ASA if starting AC.   -Telemetry  -Cont eliquis  -Cont lipitor  -Holding PTA antihypertensives to allow permissive HTN (goal SBP <180), labetalol prn  -Neuro checks  -PT, OT,  SLP following  -Will need f/u with neurology in 6-8 weeks ([phone])     A-fib, new diagnosis: Rate controlled.  -Spoke with cardiology 11/8 after echo resulted and showed MS given possible need for warfarin instead of NOAC. Per cardiology, given that the mitral valve is calcified, NOAC is fine. If this was non-calcified / rheumatic MS, then patient would need to be on warfarin.   -Cont eliquis    Elevated troponin, likely 2/2 demand ischemia / type II NSTEMI: EKG w/o ischemic changes. Trop peaked at 374, then trended down. Denies CP or SOB, but patient is not a reliable historian given suspected expressive aphasia.   -Spoke with cardiology at the [facility] 11/8 given complexity of the situation due to patient's inability to give reliable history. Cardiology agreed that there were no ischemic changes on EKG. Advised that this is likely demand ischemia, but recommended treating as an NSTEMI with starting a heparin gtt if there were WMA on echo or LV thrombus. If neither or these were noted, no need for heparin gtt and recommended starting eliquis as above. Given echo w/o WMA or LV thrombus, no indication for heparin gtt.    Acute metabolic encephalopathy vs expressive aphasia (latter is suspected): Ammonia, TSH and B12 all okay. UA neg for infection. CT/MRI brain as above. CXR w/o infiltrate.  -Management CVA as above  -Monitor mental status, improving this AM    Leukocytosis: Likely reactive. Afebrile. No e/o infection noted on UA, CXR or CT C/A/P.  -Trend WBC and fever curve    Mediastinal lymphadenopathy: CT C/A/P notable for several prominent to mildly enlarged nonspecific mediastinal lymph nodes.  -Will need repeat CT to re-evaluate as an o/p and if the CHANDLER persists / worsens, will need to consider biopsy    Mild hyponatremia:  -Resolved on labs this AM  -Trend na    HTN:  -Holding PTA hydrochlorothiazide, toprol, lisinopril, amlodipine to allow permissive HTN as above    Chronic anemia: H/H is somewhat below  "prior baseline, but no labs available since  and Hb at that time was 12. No e/o active bleeding. B12 and folate both WNL. Iron studies are consistent with GILDA. H/H improving.  -Check stool guaiac  -Hold off on venofer given that this can occasionally cause significant HTN and patient has had an acute CVA as above  -Cont ferrous sulfate  -Trend H/H    Aortic stenosis: Echo as above.  -Will need o/p cardiology f/u    DVT Prophylaxis: Eliquis    Code Status: Full    Dispo: ARU once medically stable       Subjective:      Patient reports feeling okay. Denies CP, SOB, cough, abd pain, N/V/D, urinary symptoms, rash, HA, or left sided weakness.     Objective:      Vitals: /73   Pulse 91   Temp 98  F (36.7  C) (Oral)   Resp 18   Ht 1.753 m (5' 9\")   Wt 80.2 kg (176 lb 12.9 oz)   SpO2 98%   BMI 26.11 kg/m  Temp (24hrs), Av.3  F (36.8  C), Min:97.8  F (36.6  C), Max:98.7  F (37.1  C)    Gen: Alert + oriented X 1, no acute distress  Eyes: No scleral icterus  Neck: No JVD  ENT: MMM  Heart: RRR, clear S1S2, no rubs or gallops, +systolic murmur  Lungs: CTA all fields, no wheezes, rales or rhonchi  Abdomen: Normal bowel sounds, soft, no tenderness to palpation  Extremities: No lower extremity edema  Skin: No rash  Neuro: Mild left facial droop, reduced left hand , speech is slow but has improved since yesterday   Psych: Appropriate affect / mood    I have reviewed all labs, imaging and other investigations.     Labs/Imaging:     Results for orders placed or performed during the hospital encounter of 22 (from the past 24 hour(s))   Echocardiogram Complete   Result Value Ref Range    LVEF  65-70%     Narrative    [id number]  [id number]  [id number]  [id number]^HIEB^HARRY^ARGENIS     [facility]  Echocardiography Laboratory  [address] [place] [address]     Name: RADHIKA OSULLIVAN  MRN: [id number]  : 1945  Study Date: 2022 02:13 PM  Age: 77 yrs  Gender: Male  Patient " Location: [facility]  Reason For Study: CVA  Ordering Physician: HARRY ALMONTE  Referring Physician: Saleem Jones  Performed By: Jodi Long RDCS     BSA: 2.0 m2  Height: 69 in  Weight: 176 lb  HR: 81  BP: 112/60 mmHg  ______________________________________________________________________________  Procedure  Complete Portable Bubble Echo Adult. [employer]ison (NDC #[id number]) given  intravenously.  ______________________________________________________________________________  Interpretation Summary     There is mild concentric left ventricular hypertrophy.  There is no thrombus seen in the left ventricle.  The left atrium is moderately dilated.  A contrast injection (Bubble Study) was performed that was negative for flow  across the interatrial septum.  The mitral valve leaflets are moderately thickened.  There is severe mitral annular calcification.  There is moderate to mod-severe (2-3+) mitral regurgitation.  There is moderate mitral stenosis.  The mean mitral valve gradient is (at afib HR 85) 9-11mmHg.  Mild (35-45mmHg) pulmonary hypertension is present.  Severe valvular aortic stenosis.  ______________________________________________________________________________  Left Ventricle  The left ventricle is normal in size. There is mild concentric left  ventricular hypertrophy. The visual ejection fraction is 65-70%. Diastolic  function not assessed due to atrial fibrillation. Normal left ventricular wall  motion. There is no thrombus seen in the left ventricle.     Right Ventricle  The right ventricle is normal in size and function.     Atria  The left atrium is moderately dilated. Right atrial size is normal. A contrast  injection (Bubble Study) was performed that was negative for flow across the  interatrial septum.     Mitral Valve  The mitral valve leaflets are moderately thickened. There is severe mitral  annular calcification. There is moderate to mod-severe (2-3+) mitral  regurgitation. There is moderate mitral  stenosis. The mean mitral valve  gradient is (at afib HR 85) 9-11mmHg.     Tricuspid Valve  There is trace to mild tricuspid regurgitation. The right ventricular systolic  pressure is approximated at 28.7 mmHg plus the right atrial pressure. Mild  (35-45mmHg) pulmonary hypertension is present. IVC diameter >2.1 cm collapsing  <50% with sniff suggests a high RA pressure estimated at 15 mmHg or greater.     Aortic Valve  The aortic valve is not well visualized. Severe valvular aortic stenosis. The  mean AoV pressure gradient is 43.9 mmHg.     Pulmonic Valve  The pulmonic valve is not well seen, but is grossly normal.     Vessels  The aortic root is not well visualized.     Pericardium  The pericardium appears normal.     Rhythm  The rhythm was atrial fibrillation with controlled ventricular rate at rest.  ______________________________________________________________________________  MMode/2D Measurements & Calculations  IVSd: 1.1 cm     LVIDd: 4.1 cm  LVIDs: 2.8 cm  LVPWd: 1.3 cm  FS: 31.6 %  LV mass(C)d: 172.0 grams  LV mass(C)dI: 87.9 grams/m2  Ao root diam: 3.7 cm  LA dimension: 4.3 cm  asc Aorta Diam: 3.4 cm  LA/Ao: 1.2  LVOT diam: 2.2 cm  LVOT area: 3.7 cm2  LA Volume (BP): 103.0 ml  LA Volume Index (BP): 52.6 ml/m2  RWT: 0.64     Doppler Measurements & Calculations  MV max P.6 mmHg  MV mean PG: 10.5 mmHg  MV V2 VTI: 58.3 cm  MVA(VTI): 1.1 cm2  Ao V2 max: 436.4 cm/sec  Ao max P.0 mmHg  Ao V2 mean: 306.3 cm/sec  Ao mean P.9 mmHg  Ao V2 VTI: 74.0 cm  KARAN(I,D): 0.89 cm2  KARAN(V,D): 0.85 cm2  LV V1 max P.1 mmHg  LV V1 max: 100.6 cm/sec  LV V1 VTI: 17.8 cm  SV(LVOT): 66.0 ml  SI(LVOT): 33.7 ml/m2  TR max naeem: 267.6 cm/sec  TR max P.7 mmHg  AV Naeem Ratio (DI): 0.23  KARAN Index (cm2/m2): 0.46     ______________________________________________________________________________  Report approved by: Zita Chino 2022 03:23 PM         Glucose by meter   Result Value Ref Range    GLUCOSE BY  METER POCT 161 (H) 70 - 99 mg/dL   CBC with platelets   Result Value Ref Range    WBC Count 14.7 (H) 4.0 - 11.0 10e3/uL    RBC Count 3.53 (L) 4.40 - 5.90 10e6/uL    Hemoglobin 11.1 (L) 13.3 - 17.7 g/dL    Hematocrit 33.2 (L) 40.0 - 53.0 %    MCV 94 78 - 100 fL    MCH 31.4 26.5 - 33.0 pg    MCHC 33.4 31.5 - 36.5 g/dL    RDW 13.2 10.0 - 15.0 %    Platelet Count 202 150 - 450 10e3/uL   Basic metabolic panel   Result Value Ref Range    Sodium 136 136 - 145 mmol/L    Potassium 3.8 3.4 - 5.3 mmol/L    Chloride 102 98 - 107 mmol/L    Carbon Dioxide (CO2) 24 22 - 29 mmol/L    Anion Gap 10 7 - 15 mmol/L    Urea Nitrogen 16.0 8.0 - 23.0 mg/dL    Creatinine 0.84 0.67 - 1.17 mg/dL    Calcium 8.8 8.8 - 10.2 mg/dL    Glucose 137 (H) 70 - 99 mg/dL    GFR Estimate 89 >60 mL/min/1.73m2   Glucose by meter   Result Value Ref Range    GLUCOSE BY METER POCT 119 (H) 70 - 99 mg/dL   Glucose by meter   Result Value Ref Range    GLUCOSE BY METER POCT 130 (H) 70 - 99 mg/dL         Eden Jordan MD  11/09/2022 12:56 PM

## 2022-11-10 NOTE — PROGRESS NOTES
"    Reason for Admission: Ischemic Stroke         Neuro: Alert to person and situation this evening - oriented x 4 at lunch and only oriented to self at breakfast.    GI/: Ortega in place. 250ml dark/tea colored urine. Patient has a very poor oral intake even with encouragement.     Resp:     CV: LS clear. Cough noted this evening - infrequent and dry.    Skin: scab left shin.    Activity: A1 with walker and gait belt    Lines/Drains: SL    Vitals:/84   Pulse 87   Temp 99.4  F (37.4  C) (Oral)   Resp 18   Ht 1.753 m (5' 9\")   Wt 80.2 kg (176 lb 12.9 oz)   SpO2 98%   BMI 26.11 kg/m      Pain: denies  .   "

## 2022-11-10 NOTE — PROGRESS NOTES
Internal Medicine Progress Note     Service Date: 11/10/2022  [facility] - Admission Date: 11/7/2022     Problems:     Patient Active Problem List   Diagnosis     Ischemic stroke (H)       Assessments and Plans:     A 78 yo male with a h/o HTN and aortic stenosis who confusion with speech disturbance, and family concern that he may have left sided weakness and a left facial droop, found to have acute multifocal ischemic CVA and a-fib.    Acute, multifocal ischemic CVA 2/2 embolism related to a-fib: CT head showed b/l cerebral white matter hypodensities which may represent the sequela of chronic microvascular ischemia, but they remain age-indeterminate in the absence of prior exams for comparison and may mask a small acute infarct. CTA head neg for large vessel occlusion, high-grade stenosis, or aneurysm. CTA neck showed no e/o carotid or vertebral artery hemodynamically significant stenosis, dissection, or pseudoaneurysm. MRI brain showed numerous punctate scattered supratentorial and infratentorial acute infarcts, specifically involving the right hippocampus; this distribution is suspicious for a thromboembolic etiology; no significant mass effect or hemorrhagic conversion. CT C/A/P was obtained given 3 T sign was suspicious for  possible underlying malignancy and showed non-specific CHANDLER as below. EKG showed rate controlled a-fib. Echo showed preserved EF, mild LVH, no LV thrombus, neg bubble study, moderate to severe MR, moderate MS, severe aortic stenosis, and mild pulmonary HTN with PAP 35-45mmHg. Total cholesterol 135, TG 66 HDL 53, LDL 69, A1c 5.0%.   -Stroke neurology consult appreciated. Recommended limited permissive HTN with goal SBP <180, continue lipitor at 10mg daily, starting eliquis, and no need for ASA if starting AC.   -Telemetry  -Cont eliquis  -Cont lipitor  -Holding PTA antihypertensives for now given patient's BP is normotensive to borderline low intermittently,  labetalol prn. Outside window to need permissive HTN now.   -Neuro checks  -PT, OT, SLP following  -Will need f/u with neurology in 6-8 weeks ([phone])     A-fib, new diagnosis: Rate controlled.  -Spoke with cardiology 11/8 after echo resulted and showed MS given possible need for warfarin instead of NOAC. Per cardiology, given that the mitral valve is calcified, NOAC is fine. If this was non-calcified / rheumatic MS, then patient would need to be on warfarin.   -Cont eliquis    Sepsis 2/2 pneumonia, possibly aspiration given admission with CVA as above: Patient with reported fever of 103 overnight and tachycardic. CXR with increased interstitial and hazy opacities in both lungs (L>R); non-specific, but  but could be infectious or inflammatory in etiology; pulmonary edema is another possibility. BNP elevated but patient is not clinically volume OL. PCT 0.55. CT chest obtained to further clarify CXR and this showed LLL pneumonia and persistent mediastinal CHANDLER as below, but no e/o volume OL. No leukocytosis. Started on ceftriaxone and azithromycin by overnight hospitalist.  -Cont ceftriaxone, azithromycin  -Start IVF  -Start duonebs, prn albuterol  -Start mucinex, prn tessalon  -F/u urine legionalla/strep pneumo ag, RVP  -Sputum cx if able to expectorate  -F/u BCx: NGTD  -Trend WBC and fever curve    Elevated troponin, likely 2/2 demand ischemia / type II NSTEMI: EKG w/o ischemic changes. Trop peaked at 374, then trended down. Denies CP or SOB, but patient is not a reliable historian given suspected expressive aphasia.   -Spoke with cardiology at the [facility] 11/8 given complexity of the situation due to patient's inability to give reliable history. Cardiology agreed that there were no ischemic changes on EKG. Advised that this is likely demand ischemia, but recommended treating as an NSTEMI with starting a heparin gtt if there were WMA on echo or LV thrombus. If neither or these were noted, no need for heparin  "gtt and recommended starting eliquis as above. Given echo w/o WMA or LV thrombus, no indication for heparin gtt.    Acute metabolic encephalopathy vs expressive aphasia (latter is suspected): Ammonia, TSH and B12 all okay. UA neg for infection. CT/MRI brain as above. CXR w/o infiltrate.  -Resolved, patient now fully oriented this AM  -Management CVA as above  -Monitor mental status    Mediastinal lymphadenopathy: CT C/A/P notable for several prominent to mildly enlarged nonspecific mediastinal lymph nodes.  -Will need repeat CT to re-evaluate as an o/p and if the CHANDLER persists / worsens, will need to consider biopsy    Mild hyponatremia:  -Resolved    -Trend na    HTN: BP currently normotensive to borderline low.   -Holding PTA hydrochlorothiazide, toprol, lisinopril, amlodipine as above    Chronic anemia: H/H is somewhat below prior baseline, but no labs available since  and Hb at that time was 12. No e/o active bleeding. B12 and folate both WNL. Iron studies are consistent with GILDA. H/H stable.  -Check stool guaiac  -Hold off on venofer given that this can occasionally cause significant HTN and patient has had an acute CVA as above  -Cont ferrous sulfate  -Trend H/H    Aortic stenosis: Echo as above.  -Will need o/p cardiology f/u    DVT Prophylaxis: Eliquis    Code Status: Full    Dispo: ARU once medically stable       Subjective:      Patient reports feeling SOB this AM and has a new cough. Also has reported chills, but no diaphoresis. Denies CP, abd pain, N/V/D, urinary symptoms, rash, HA, or left sided weakness.     Objective:      Vitals: /60   Pulse 89   Temp 97.8  F (36.6  C) (Oral)   Resp 20   Ht 1.753 m (5' 9\")   Wt 80.2 kg (176 lb 12.9 oz)   SpO2 96%   BMI 26.11 kg/m  Temp (24hrs), Av.3  F (36.8  C), Min:97.8  F (36.6  C), Max:98.7  F (37.1  C)    Gen: Alert + oriented X 3, no acute distress  Eyes: No scleral icterus  Neck: No JVD  ENT: MMM  Heart: RRR, clear S1S2, no rubs or gallops, " +systolic murmur  Lungs: Scattered rhonchi  Abdomen: Normal bowel sounds, soft, no tenderness to palpation  Extremities: No lower extremity edema  Skin: No rash  Neuro: ?Mild left facial droop, reduced left hand  (improving), speech improved   Psych: Appropriate affect / mood    I have reviewed all labs, imaging and other investigations.     Labs/Imaging:     Results for orders placed or performed during the hospital encounter of 11/07/22 (from the past 24 hour(s))   Glucose by meter   Result Value Ref Range    GLUCOSE BY METER POCT 130 (H) 70 - 99 mg/dL   Glucose by meter   Result Value Ref Range    GLUCOSE BY METER POCT 161 (H) 70 - 99 mg/dL   Glucose by meter   Result Value Ref Range    GLUCOSE BY METER POCT 127 (H) 70 - 99 mg/dL   Glucose by meter   Result Value Ref Range    GLUCOSE BY METER POCT 114 (H) 70 - 99 mg/dL   CBC with platelets   Result Value Ref Range    WBC Count 9.9 4.0 - 11.0 10e3/uL    RBC Count 3.50 (L) 4.40 - 5.90 10e6/uL    Hemoglobin 11.0 (L) 13.3 - 17.7 g/dL    Hematocrit 33.5 (L) 40.0 - 53.0 %    MCV 96 78 - 100 fL    MCH 31.4 26.5 - 33.0 pg    MCHC 32.8 31.5 - 36.5 g/dL    RDW 13.2 10.0 - 15.0 %    Platelet Count 190 150 - 450 10e3/uL   Extra Tube    Narrative    The following orders were created for panel order Extra Tube.  Procedure                               Abnormality         Status                     ---------                               -----------         ------                     Extra Green Top (Lithium...[[id number]]                      Final result                 Please view results for these tests on the individual orders.   Extra Green Top (Lithium Heparin) Tube   Result Value Ref Range    Hold Specimen JIC    XR Chest Port 1 View    Narrative    EXAM: CHEST SINGLE VIEW PORTABLE  LOCATION: [facility]  DATE/TIME: 11/10/2022 2:19 AM    INDICATION: Fever.  COMPARISON: 11/8/2022 - CT chest, abdomen and pelvis.    FINDINGS: Mildly increased  interstitial and hazy opacities in both lungs, left side more prominent than right. Unchanged cardiac silhouette. Atherosclerotic calcification in the thoracic aorta.      Impression    IMPRESSION: Mildly increased interstitial and hazy opacities in both lungs, left side more prominent than right. These are nonspecific, but could be infectious or inflammatory in etiology. Pulmonary edema is another possibility. These findings are new   since the comparison CT scan dated 11/8/2022.    Glucose by meter   Result Value Ref Range    GLUCOSE BY METER POCT 108 (H) 70 - 99 mg/dL   Procalcitonin   Result Value Ref Range    Procalcitonin 0.55 (H) <0.05 ng/mL   Nt probnp inpatient   Result Value Ref Range    N terminal Pro BNP Inpatient 7,049 (H) 0 - 1,800 pg/mL   CT Chest w Contrast    Narrative    CT CHEST W CONTRAST 11/10/2022 10:17 AM    CLINICAL HISTORY: SOB, CXR with opacities ?infection vs volume OL, BNP  elevated but not clincally volume OL. Need CT to further eval.  TECHNIQUE: CT chest with IV contrast. Multiplanar reformats were  obtained. Dose reduction techniques were used.    CONTRAST: 86mL isovue 370    COMPARISON: Chest radiograph 11/10/2022 and chest CT 11/8/2022.    FINDINGS:   LUNGS AND PLEURA: New mild patchy nodular opacities in the left lower  lobe consistent with pneumonia. Lungs are otherwise clear. The central  airways are patent.No pleural effusion.    MEDIASTINUM/AXILLAE: Mild mediastinal lymphadenopathy has minimally  changed since recent comparison. The largest left paratracheal lymph  node measures 1.4 cm. No aortic aneurysm. Aortic annulus and dense  mitral annulus calcifications.    CORONARY ARTERY CALCIFICATION: Moderate.    UPPER ABDOMEN: Cholelithiasis.    MUSCULOSKELETAL: Old left rib fractures.      Impression    IMPRESSION:   1.  Mild left lower lobe pneumonia.  2.  Mild nonspecific mediastinal lymphadenopathy without significant  change since 11/8/2022.  3.  Cholelithiasis.    ARABELLA COLLADO  MD ОЛЬГА         SYSTEM ID:  [id number]         Eden Jordan MD  11/10/2022 10:45 AM

## 2022-11-10 NOTE — PROGRESS NOTES
Care Management Follow Up    Length of Stay (days): 3    Expected Discharge Date: 11/11/2022     Concerns to be Addressed:       Patient plan of care discussed at interdisciplinary rounds: Yes    Anticipated Discharge Disposition: Acute Rehab     Anticipated Discharge Services: Transportation Services  Anticipated Discharge DME:      Patient/family educated on Medicare website which has current facility and service quality ratings: yes  Education Provided on the Discharge Plan:    Patient/Family in Agreement with the Plan: yes    Referrals Placed by CM/SW: External Care Coordination, Post Acute Facilities, Transportation  Private pay costs discussed: Not applicable    Additional Information:    11:50- spoke w/ wife, Luba and provided an update on discharge plan. Per MD rounds, patient is not medically ready to discharge, likely will need a few more days.       Acute Rehab referrals:     [facility] [phone] -  Updated intake team that patient is not medically ready. Intake will continue to follow. Pt's are accepted over the weekend.     [facility] [phone] - Left  w/ update.  Pt's are not accepted over the weekend.     [facility] P) [phone] F) [phone] - 11/09- Declined, not appropriate due to poor short term rehab, making physical improvement and not requiring 3 plus hours of therapy.     Plan:  ARU when medically ready, patient will need transport arranged.       NAEEM Gray  Care Management, [facility]  [phone]

## 2022-11-10 NOTE — PROGRESS NOTES
Pts fever lowered after medications.  Pt does not have any changes in mentation since.  Pt is resting comfortably while maintaining SpO2.

## 2022-11-10 NOTE — PLAN OF CARE
Goal Outcome Evaluation:  Pt's RR 40 with loose cough and coarse lungs joshua. Temp 103.1 oral. Placed Ice packs and cool cloth. Pt's neuro status is equal to earlier assessment. He says Yes to every question asked. He awakens easily but lethargic, cooperative. Messaged tele eagle with new symptoms and alerted to urine output also.  Received orders.

## 2022-11-10 NOTE — PROGRESS NOTES
End Of Shift Note    Situation: ischemic stroke- new onset of fever last night- pneumonia.  Now positive for Influenza A and Influenza A H3    Plan: Continue with antibiotics and pulmonary toileting    Subjective/Objective:    Neuro: Pt is now A&Ox4, pleasant with staff and follows commands. Equal strength to the BLE and his RUE is only slightly weaker than the LUE.Cardiac: VSS and afebrile this shift    Resp: Has a frequent dry cough. Mucinex was added to his med regimen per Dr. Jordan. Writer educated the pt on the importance of deep breathing and he verbalized understanding. Pt has coarse lung sounds to all fields.    GI/: UA/UC and Legionella done, both are negative. Pt has an adequate urine output, urine continues to be tea colored.      MSK:  Pt is able to ambulate with his walker and gait belt with a SBA.    Skin: scab to left shin.    LDAs: Pt has NS running at 50 ml/hr.

## 2022-11-10 NOTE — PROGRESS NOTES
Pt's wife called twice to get an update about her  and approx how long he will remain in the hospital. Writer explained that he was dx'd with pneumonia last night and started on 2 new antibiotics, and he would most likely be in the hospital a few more days. Pt's wife stated that she would like to speak to the  because she hasn't today. Writer spoke with the  Odilia to contact the wife, Odilia stated she would call her. Per Janneth note she spoke with the wife Luba at 11:50, per Luba she said she did not speak to Odilia.

## 2022-11-10 NOTE — SIGNIFICANT EVENT
Significant Event Note    Pt developed a fever overnight 103.1, noted to have dry cough, increased RR; 02 sat 96% RA  Xray done-bilateral opacities-infectious vs edema  Pt seen via video, sleeping but wakes easily; no respiratory distress  Respiratory panel, blood cultures ordered  Start ceftriaxone and azithromycin for probable bilateral pneumonia

## 2022-11-11 NOTE — PROGRESS NOTES
Pt sitting up in chair. He denies pain or nausea, headache or dizziness.. He looks tired. Temp orally is now 101.6. He takes Tylenol and other scheduled meds without difficulty. Lungs coarse/diminished, on roomair, he denies soa. He has bilateral strength on hand grasps and foot push pulls .he says he has some tingling in his feet, which he doesn't remember having before. Monitor temp. Bed alarm is on for safety.

## 2022-11-11 NOTE — PROGRESS NOTES
Patient here for Ischemic stroke, which sx's seem to have resolved. Pt also has LLL pneumonia and Influenza A and AH3. He has had a positive BC for Gram + cocci in clusters, Veregene panel is pending. Vanco started. He is also getting Rocephin and Zithromax. PT is A and O times 4, forgetful. He does not call much for anything. He has coarse lungs non prod cough, Now on O2 via NC at 2 lpm. He says the neb and the Oxygen have helped him feel better. T max was 101.6 He has a charles for urine retention, urine is tea colored. Pt needs reminders to drink water. IVF NS at 50/hr.Pt up with Walker and asst 1. Bed alarm is on for safety.

## 2022-11-11 NOTE — CARE PLAN
Provider Notification    MD notified: [facility]    Time: 0742    Reason: blood culture species resulted

## 2022-11-11 NOTE — CARE PLAN
End Of Shift Note    Situation: 76 yo male here for ischemic CVA with A fib, Influenza A +    Plan: Telemetry, Anticoag, Neuro checks, Droplet precautions,     Subjective/Objective:    Neuro: [place], AO x 4, forgetful, Afebrile    Cardiac: A fib, on elequis,     Resp: Coarse, Exp. Wheezes, on room air    GI/: [place]te[place], no BM since 11/8    MSK: SBA w/walker    Skin: Scab on shin    LDAs: PIV  NS at 75 mL/hr

## 2022-11-11 NOTE — PROGRESS NOTES
[facility] called to report Blood culture result from 11/10 right arm is showing Gram positive Cocci in clusters.  [facility] [facility] GP panel in process. Pt currently on Azithromycin and Rocephin. Will call stefanie FARNSWORTH with result.

## 2022-11-11 NOTE — PROGRESS NOTES
"Patient asled how he is doing and his response was \"Not very good\". Loud expiratory wheezes heard throughout lungs. RR 24. O2 sat 92% on room air. RT called for neb treatment.  "

## 2022-11-11 NOTE — PROGRESS NOTES
Care Management Follow Up    Length of Stay (days): 4    Expected Discharge Date: 11/11/2022     Concerns to be Addressed: discharge planning      Patient plan of care discussed at interdisciplinary rounds: Yes    Anticipated Discharge Disposition: Acute Rehab     Anticipated Discharge Services: Transportation Services    Patient/family educated on Medicare website which has current facility and service quality ratings: yes  Education Provided on the Discharge Plan: yes   Patient/Family in Agreement with the Plan: yes    Referrals Placed by CM/SW: External Care Coordination, Post Acute Facilities, Transportation  Private pay costs discussed: transportation costs    Additional Information:    Per IDT rounds today, MD team states that patient is not medically stable for discharge today. Possible discharge Sunday.     [phone], they are able to accept patient for cares on Sunday if medically ready.    Addendum: Spoke with NELSON Zelaya in admissions at [facility]. She requests that OT re-evaluate all ADLs to help determine if patient still meets [facility] criteria.   IP therapy team updated on request.     PLAN: [facility].        LITA SIMON RN

## 2022-11-11 NOTE — PROGRESS NOTES
Antimicrobial Stewardship Team Note    Antimicrobial Stewardship Program - A joint venture between [facility] and  Physicians to optimize antibiotic management.  NOT a formal consult - Restricted Antimicrobial Review     Patient: Leon Alvarado  MRN: [id number]  Allergies: Patient has no known allergies.    Brief Summary:   Leon Alvarado is a 77 year old male with a PMHx of aortic stenosis and HTN admitted to the ED on 11/7 for left-side weakness, left facial droop, and difficulty speaking.    History of present illness:  The patient was unable to provide significant history. His wife noted previously described symptoms around 2pm on 11/7, and the patient was admitted a few hours later. Initial history obtained from EMS, who report that they were called for altered mental status that started several hours prior to their arrival and they were concerned for left-sided weakness and left facial droop. Glucose at the time was normal. The patient was activated for stroke prior to arrival and a level 1 stroke code was called when the patient's ambulance arrived. Patient was brought immediately for CT scan, obtaining some basic history and exam while he was being transferred to the CT scanner. Once able to provide a history, the patient noted he was not sure when symptoms started, and he seemed confused and was unable to give any consistent history. Later in the evening, the patient denied headache, nausea, vomiting, chest pain, shortness of breath, cough, abdominal pain, diarrhea, or rash. Physical exam on admission was significant for confusion.    On admission, patient was afebrile, normotensive, pulse 89, RR 11, SPO2 97% on room air. Initial labs showed WBC 17.4, ANC 15.1, creatinine 1.02 (unsure of baseline). Initial imaging was mostly unremarkable for an infectious process, as the focus seemed to be stroke management. The patient was COVID-19 negative on admission, and no antibiotics were  initiated.    Of note, on 11/10 the patient significantly worsened, becoming febrile (max temp 103.1F), tachycardia (p 105), and was noted to have a dry cough, and is currently on 2L NC. CXR on 11/10 showed mildly increased interstitial and hazy opacities in both lungs, left side more prominent than right, saying that these are nonspecific, but could be infectious or inflammatory in etiology. CT chest w/ contrast showed mild left lower lobe pneumonia. Blood cultures grew Gram-positive cocci in clusters in 1/4 bottles, Verigene detected Staphylococcus epidermidis, a UA was non-inflammatory. A respiratory panel was positive for Influenza A and AH3, and Streptococcus pneumoniae and Legionella urine antigen test were both negative. The patient was started on azithromycin, ceftriaxone, vancomycin, and oseltamivir which he remains on today.       Active Anti-infective Medications   (From admission, onward)                 Start     Stop    11/11/22 1430  vancomycin (VANCOCIN) injection  750 mg,   Intravenous,   EVERY 12 HOURS        Bacteremia        --    11/10/22 2000  oseltamivir  75 mg,   Oral,   2 TIMES DAILY        Influenza        --    11/10/22 0230  azithromycin (ZITHROMAX) 500 mg vial to attach to  mL bag  500 mg,   Intravenous,   EVERY 24 HOURS        Community Acquired Pneumonia        --    11/10/22 0200  cefTRIAXone  1 g,   Intravenous,   EVERY 24 HOURS        Community Acquired Pneumonia        --                  Assessment: Influenza A pneumonia vs bacterial pneumonia  Patient developed a concerning clinical picture for infection after hospitalized for over 48 hours, which may give concern for a hospital-acquired pneumonia, since the patient became febrile, tachycardic, required supplemental oxygen, and developed a dry cough. However, radiographic findings are not conclusive for a typical bacterial infection, lacking dense lobar consolidation, and CXR shows haziness in both lungs, which may suggest  either an atypical or viral pneumonia. Strep pneumoniae urine antigen is negative.  The patient's WBC also declined before receiving antibiotics, making a superimposed bacterial infection less likely.  Recommend discontinuing ceftriaxone today. Given respiratory panel findings and negative Legionella urine antigen, an atypical bacterial pneumonia is less likely, however, Mycoplasma pneumonia can present on imaging like this, which azithromycin will cover.  It is reasonable to continue azithromycin for a total duration of 3 days (given he is on 500 mg), end date of 11/12. Azithromycin has an additional mechanism that reduces inflammation, which may help symptoms of viral pneumonia. A positive Influenza A/AH3 on respiratory panel gives more evidence of a viral etiology, despite receiving an annual influenza vaccination on 10/13/22.    Recent meta-analysis of oseltamivir use found that it reduced the length of symptoms in patients when compared to placebo, but increased risk for nausea and vomiting. It is reasonable to continue oseltamivir for a total duration of 5 days, but the risk of side effects should be weighed against the benefit of shortening symptom duration.    Given that Staph. epidermidis grew in 1/4 bottles, did not result positive until roughly 24 hours, and is normal skin migdalia, this is likely a contaminant. Agree with discontinuing vancomycin.    Recommendations:  1) Discontinue ceftriaxone today  2) Continue azithromycin for a total of 3 days (end date 11/12)  3) Reasonable to continue oseltamivir for a total of 5 days (end date 11/14)  4) Agree with discontinuing vancomycin    Pharmacy took the following actions:   Called/paged provider, Electronic note created.    Discussed with ID Staff  Nestor Chow MD, Chantel Dowd, PharmD, MPH     Casey Sim, PharmD candidate 2023    Source:  DONNA Betancourt, SANDRA Barreto., Patrick S., & Karen, A. S. (2015). Oseltamivir treatment for influenza in adults: a  meta-analysis of randomised controlled trials. Lancet (Veliz, Yen), 385(2564), 4138-3147. https://doi.org/10.6776/-9503(23)[email or web address]    Vital Signs/Clinical Features:  Vitals         11/09 0700  11/10 0659 11/10 0700 11/11 0659 11/11 0700 11/11 1341   Most Recent      Temp ( F) 97.8 -  103.1    97.8 -  101.6    98.7 -  98.9     98.7 (37.1) 11/11 1227    Pulse 79 -  105    89 -  104    82 -  93     82 11/11 1227    Resp 18 -  40    18 -  20    20 -  22     22 11/11 1227    /62 -  123/73    105/60 -  124/80    122/69 -  125/97     125/97 11/11 1227    SpO2 (%) 88 -  98    92 -  96    91 -  96     96 11/11 1227            Labs  Estimated Creatinine Clearance: 83.5 mL/min (based on SCr of 0.84 mg/dL).  Recent Labs   Lab Test 11/07/22  1751 11/09/22  0603   CR 1.02 0.84       Recent Labs   Lab Test 11/07/22  1751 11/08/22  0302 11/09/22  0603 11/10/22  0212 11/11/22  0526   WBC 17.4* 14.3* 14.7* 9.9 9.2   HGB 10.0* 10.5* 11.1* 11.0* 9.5*   HCT 29.5* 31.4* 33.2* 33.5* 28.4*   MCV 94 95 94 96 94    170 202 190 178       Recent Labs   Lab Test 11/07/22  1751   BILITOTAL 1.1   ALKPHOS 72   ALBUMIN 3.2*   AST 27   ALT 16       Recent Labs   Lab Test 11/10/22  0838   PCAL 0.55*             Culture Results:  7-Day Micro Results       Procedure Component Value Units Date/Time    Legionella pneumophila antigen urine [[id number]]  (Normal) Collected: 11/10/22 1010    Order Status: Completed Lab Status: Final result Updated: 11/10/22 1505    Specimen: Urine, Ortega Catheter      Legionella pneumophila serogroup 1 urinary antigen Negative     Comment: Suggests no recent or current infection. Infection due to Legionella cannot be ruled out, since other serogroups and species may cause disease, antigen may not be present in urine in early infection, and the level of antigen present in the urine may be below detectable limits of the test.       Streptococcus pneumoniae antigen [[id number]]  (Normal) Collected:  11/10/22 1010    Order Status: Completed Lab Status: Final result Updated: 11/10/22 1504    Specimen: Urine, Ortega Catheter      Streptococcus pneumoniae antigen Negative     Comment: A negative Streptococcus pneumoniae antigen result does not rule out infection with Streptococcus pneumoniae.       Respiratory Aerobic Bacterial Culture with Gram Stain     Order Status: Sent Lab Status: No result     Specimen: Sputum from Expectorate     Respiratory Panel PCR [[id number]]  (Abnormal) Collected: 11/10/22 0256    Order Status: Completed Lab Status: Final result Updated: 11/10/22 1634    Specimen: Swab from Nasopharyngeal      Adenovirus Not Detected     Coronavirus Not Detected     Comment: This test detects Coronavirus 229E, HKU1, NL63 and OC43 but does not distinguish between them. It does not detect MERS ( Respiratory Syndrome), SARS (Severe Acute Respiratory Syndrome) or 2019-nCoV (Novel 2019) Coronavirus.        Human Metapneumovirus Not Detected     Human Rhin/Enterovirus Not Detected     Influenza A Detected     Influenza A, H1 Not Detected     Influenza A 2009 H1N1 Not Detected     Influenza A, H3 Detected     Influenza B Not Detected     Parainfluenza Virus 1 Not Detected     Parainfluenza Virus 2 Not Detected     Parainfluenza Virus 3 Not Detected     Parainfluenza Virus 4 Not Detected     Respiratory Syncytial Virus A Not Detected     Respiratory Syncytial Virus B Not Detected     Chlamydia Pneumoniae Not Detected     Mycoplasma Pneumoniae Not Detected    Narrative:      The ePlex Respiratory Panel is a qualitative nucleic acid, multiplex, in vitro diagnostic test for the simultaneous detection and identification of multiple respiratory viral and bacterial nucleic acids in nasopharyngeal swabs collected in viral transport media from individual exhibiting signs and symptoms of respiratory infection. The assay has received FDA approval for the testing of nasopharyngeal (NP) swabs only. The  [facility] at [facility] has validated the performance characteristics for bronchial alveolar lavage specimens. This test is used for clinical purposes and should not be regarded as investigational or for research. This laboratory is certified under the Clinical Laboratory Improvement Amendments of 1988 (CLIA-88) as qualified to perform high complexity clinical laboratory testing.     Blood Culture Arm, Right [[id number]]  (Normal) Collected: 11/10/22 0212    Order Status: Completed Lab Status: Preliminary result Updated: 11/11/22 0531    Specimen: Blood from Arm, Right      Culture No growth after 1 day    Blood Culture Arm, Right [[id number]]  (Abnormal) Collected: 11/10/22 0212    Order Status: Completed Lab Status: Preliminary result Updated: 11/11/22 0212    Specimen: Blood from Arm, Right      Culture Positive on the 1st day of incubation      Gram positive cocci in clusters     Comment: 1 of 2 bottles       Verigene GP Panel [[id number]]  (Abnormal) Collected: 11/10/22 0212    Order Status: Completed Lab Status: Final result Updated: 11/11/22 0737    Specimen: Blood from Arm, Right      Staphylococcus aureus Not Detected     Staphylococcus epidermidis Detected     Comment: Positive for Staphylococcus epidermidis and negative for the mecA gene (not resistant to methicillin) by Neoconixigene multiplex nucleic acid test. Final identification and antimicrobial susceptibility testing will be verified by standard methods.        Staphylococcus lugdunensis Not Detected     Enterococcus faecalis Not Detected     Enterococcus faecium Not Detected     Streptococcus species Not Detected     Streptococcus agalactiae Not Detected     Streptococcus anginosus group Not Detected     Streptococcus pneumoniae Not Detected     Streptococcus pyogenes Not Detected     Listeria species Not Detected    Narrative:      Specimen tested with Verigene multiplex, gram-positive blood culture nucleic  acid test for the following targets: Staphylococcus aureus, Staphylococcus epidermidis, Staphylococcus lugdunensis, other Staphylococcus species, Enterococcus faecalis, Enterococcus faecium, Streptococcus species, Streptococcus agalactiae, Streptococcus anginosus group, Streptococcus pneumoniae, Streptococcus pyogenes, Listeria species, mecA (methicillin resistance), and Amy/vanB (vancomycin resistance).            Recent Labs   Lab Test 11/08/22  0710 11/10/22  1150   URINEPH 5.0 5.0   NITRITE Negative Negative   LEUKEST Negative Negative   WBCU 3 0             Recent Labs   Lab Test 11/10/22  0256   IFLUA Detected*   FLUAH1 Not Detected   FLUAH3 Detected*   HD2477 Not Detected   IFLUB Not Detected   RSVA Not Detected   RSVB Not Detected   PIV1 Not Detected   PIV2 Not Detected   PIV3 Not Detected   HMPV Not Detected             Imaging: CT Chest/Abdomen/Pelvis w Contrast    Result Date: 11/8/2022  CT CHEST/ABDOMEN/PELVIS WITH CONTRAST 11/8/2022 9:45 AM CLINICAL HISTORY: Evaluate malignancy; patient with thromboembolic CVA. TECHNIQUE: CT scan of the chest, abdomen, and pelvis was performed following injection of IV contrast. Multiplanar reformats were obtained. Dose reduction techniques were used. CONTRAST: 78 mL Isovue 370 COMPARISON: None. FINDINGS: LUNGS AND PLEURA: Evaluation of the lung parenchyma is limited by respiratory motion artifact. A benign densely calcified subpleural granuloma is seen in the posterior aspect of the right upper lobe. No suspicious appearing pulmonary nodules or masses. No airspace consolidation or pleural fluid. Mild bibasilar atelectasis. Central airways are patent. MEDIASTINUM/AXILLAE: There are several prominent and mildly enlarged mediastinal lymph nodes present. Largest example is seen in the precarinal region measuring 15 mm in short axis (2-45). No enlarged axillary or hilar lymph nodes. Heart size is mildly enlarged. No pericardial effusion. Thoracic aorta is normal in course  and caliber. Mild to moderate thoracic aortic atherosclerosis is present. Moderate three-vessel coronary artery atherosclerosis is seen. Coarse calcifications of the mitral annulus are noted. HEPATOBILIARY: Peripherally calcified gallstones are seen within the gallbladder lumen. No gallbladder wall thickening or pericholecystic fluid. No bile duct dilation. Nonmass-like low density is seen in the anterior aspect of the liver near the ligament of teres, favoring fatty infiltration. No follow-up is necessary. Liver contour is smooth. PANCREAS: Normal. SPLEEN: Normal. ADRENAL GLANDS: Normal. KIDNEYS/BLADDER: Low-attenuation subcentimeter renal lesion(s). These are compatible with small benign cysts and no specific imaging evaluation or follow-up is recommended. No nephrolithiasis or hydronephrosis. Urinary bladder is partially decompressed. There is mild urinary bladder wall thickening. A couple tiny foci of gas are seen in the urinary bladder lumen. BOWEL: Colonic diverticulosis is present without findings of diverticulitis. Large and small bowel loops are nonobstructed and noninflamed. Appendix is normal-appearing. PELVIC ORGANS: There is trace nonspecific free pelvic fluid present. No cystic or solid pelvic mass. ADDITIONAL FINDINGS: Moderate to severe atherosclerosis of the abdominal aorta and iliac arteries is present. No aneurysmal dilation. Small fat-containing noninflamed right inguinal hernia. No enlarged abdominal or pelvic lymph nodes. MUSCULOSKELETAL: Multilevel hypertrophic and degenerative changes of the spine are present. There is mild superior endplate compression deformity of the T9 vertebral body. Several remote, healed left anterior rib fractures. No acute osseous abnormality.     IMPRESSION: 1.  Several prominent to mildly enlarged nonspecific mediastinal lymph nodes. 2.  Atherosclerotic vascular disease, including at least moderate coronary artery atherosclerosis. 3.  Cholelithiasis without  evidence of acute cholecystitis. 4.  Colonic diverticulosis without signs of diverticulitis. Mild circumferential urinary bladder wall thickening and tiny foci of gas in the urinary bladder lumen, which can be seen with cystitis. Consider correlation with urinalysis if clinically indicated. 5.  Nonspecific trace free pelvic fluid. DULCE POLANCO MD   SYSTEM ID:  [id number]    XR Chest 1 View    Result Date: 2022  EXAM: XR CHEST 1 VIEW LOCATION: [facility] DATE/TIME: 2022 7:28 PM INDICATION: encephalopathy COMPARISON: None.     IMPRESSION: Low lung volumes. No definite focal pneumonia. Heart size likely upper limits of normal with heavy calcification of mitral annulus. Mild central hilar congestion may relate to the low lung volumes. No metallic foreign body.    Echocardiogram Complete    Result Date: 2022  [id number]^GAGE^HARRY^ARGENIS  [facility] [address] [place] [address]  Name: RADHIKA OSULLIVAN MRN: [id number] : 1945 Study Date: 2022 02:13 PM Age: 77 yrs Gender: Male Patient Location: [facility] Reason For Study: CVA Ordering Physician: HARRY ALMONTE Referring Physician: Saleem Jones Performed By: Jodi Long RDCS  BSA: 2.0 m2 Height: 69 in Weight: 176 lb HR: 81 BP: 112/60 mmHg ______________________________________________________________________________ Procedure Complete Portable Bubble Echo Adult. Optison (NDC #9356-4594) given intravenously. ______________________________________________________________________________ Interpretation Summary  There is mild concentric left ventricular hypertrophy. There is no thrombus seen in the left ventricle. The left atrium is moderately dilated. A contrast injection (Bubble Study) was performed that was negative for flow across the interatrial septum. The mitral valve leaflets are moderately thickened. There is severe mitral annular  calcification. There is moderate to mod-severe (2-3+) mitral regurgitation. There is moderate mitral stenosis. The mean mitral valve gradient is (at afib HR 85) 9-11mmHg. Mild (35-45mmHg) pulmonary hypertension is present. Severe valvular aortic stenosis. ______________________________________________________________________________ Left Ventricle The left ventricle is normal in size. There is mild concentric left ventricular hypertrophy. The visual ejection fraction is 65-70%. Diastolic function not assessed due to atrial fibrillation. Normal left ventricular wall motion. There is no thrombus seen in the left ventricle.  Right Ventricle The right ventricle is normal in size and function.  Atria The left atrium is moderately dilated. Right atrial size is normal. A contrast injection (Bubble Study) was performed that was negative for flow across the interatrial septum.  Mitral Valve The mitral valve leaflets are moderately thickened. There is severe mitral annular calcification. There is moderate to mod-severe (2-3+) mitral regurgitation. There is moderate mitral stenosis. The mean mitral valve gradient is (at afib HR 85) 9-11mmHg.  Tricuspid Valve There is trace to mild tricuspid regurgitation. The right ventricular systolic pressure is approximated at 28.7 mmHg plus the right atrial pressure. Mild (35-45mmHg) pulmonary hypertension is present. IVC diameter >2.1 cm collapsing <50% with sniff suggests a high RA pressure estimated at 15 mmHg or greater.  Aortic Valve The aortic valve is not well visualized. Severe valvular aortic stenosis. The mean AoV pressure gradient is 43.9 mmHg.  Pulmonic Valve The pulmonic valve is not well seen, but is grossly normal.  Vessels The aortic root is not well visualized.  Pericardium The pericardium appears normal.  Rhythm The rhythm was atrial fibrillation with controlled ventricular rate at rest. ______________________________________________________________________________  MMode/2D Measurements & Calculations IVSd: 1.1 cm  LVIDd: 4.1 cm LVIDs: 2.8 cm LVPWd: 1.3 cm FS: 31.6 % LV mass(C)d: 172.0 grams LV mass(C)dI: 87.9 grams/m2 Ao root diam: 3.7 cm LA dimension: 4.3 cm asc Aorta Diam: 3.4 cm LA/Ao: 1.2 LVOT diam: 2.2 cm LVOT area: 3.7 cm2 LA Volume (BP): 103.0 ml LA Volume Index (BP): 52.6 ml/m2 RWT: 0.64  Doppler Measurements & Calculations MV max P.6 mmHg MV mean PG: 10.5 mmHg MV V2 VTI: 58.3 cm MVA(VTI): 1.1 cm2 Ao V2 max: 436.4 cm/sec Ao max P.0 mmHg Ao V2 mean: 306.3 cm/sec Ao mean P.9 mmHg Ao V2 VTI: 74.0 cm KARAN(I,D): 0.89 cm2 KAARN(V,D): 0.85 cm2 LV V1 max P.1 mmHg LV V1 max: 100.6 cm/sec LV V1 VTI: 17.8 cm SV(LVOT): 66.0 ml SI(LVOT): 33.7 ml/m2 TR max naeem: 267.6 cm/sec TR max P.7 mmHg AV Naeem Ratio (DI): 0.23 KARAN Index (cm2/m2): 0.46  ______________________________________________________________________________ Report approved by: Zita Chino 2022 03:23 PM       XR Chest Port 1 View    Result Date: 11/10/2022  EXAM: CHEST SINGLE VIEW PORTABLE LOCATION: [facility] DATE/TIME: 11/10/2022 2:19 AM INDICATION: Fever. COMPARISON: 2022 - CT chest, abdomen and pelvis. FINDINGS: Mildly increased interstitial and hazy opacities in both lungs, left side more prominent than right. Unchanged cardiac silhouette. Atherosclerotic calcification in the thoracic aorta.     IMPRESSION: Mildly increased interstitial and hazy opacities in both lungs, left side more prominent than right. These are nonspecific, but could be infectious or inflammatory in etiology. Pulmonary edema is another possibility. These findings are new since the comparison CT scan dated 2022.     XR Abdomen 1 View    Result Date: 2022  EXAM: XR ABDOMEN 1 VIEW LOCATION: [facility] DATE/TIME: 2022 7:27 PM INDICATION: encephalopathy Pre MRI screening COMPARISON: None.     IMPRESSION: Negative abdomen. Bowel gas pattern is  normal. Nothing for obstruction or free air. Contrast present throughout the urinary collecting system. No metallic foreign body identified.    MR Brain w/o & w Contrast    Result Date: 11/7/2022  EXAM: MR BRAIN W/O and W CONTRAST LOCATION: [facility] DATE/TIME: 11/7/2022 8:24 PM INDICATION: Encephalopathy COMPARISON:  Earlier same day CTs. CONTRAST: Gadavist 8 mL TECHNIQUE: Routine multiplanar multisequence head MRI without and with intravenous contrast. FINDINGS: INTRACRANIAL CONTENTS: Numerous punctate scattered bilateral supratentorial and infratentorial acute infarcts, specifically involving the right hippocampus (series 3.2, image 13). No mass, acute hemorrhage, or extra-axial fluid collections. Scattered nonspecific T2/FLAIR hyperintensities within the cerebral white matter most consistent with mild chronic microvascular ischemic change. Mild generalized cerebral atrophy. No hydrocephalus. Normal position of the cerebellar tonsils. No pathologic contrast  enhancement. SELLA: No abnormality accounting for technique. OSSEOUS STRUCTURES/SOFT TISSUES: Normal marrow signal. The major intracranial vascular flow voids are maintained. ORBITS: No abnormality accounting for technique. SINUSES/MASTOIDS: No paranasal sinus mucosal disease. No middle ear or mastoid effusion.     IMPRESSION: 1.  Numerous punctate scattered supratentorial and infratentorial acute infarcts, specifically involving the right hippocampus. This distribution is suspicious for a thromboembolic etiology. No significant mass effect or hemorrhagic conversion. [Critical Result: Acute infarcts] Finding was identified on 11/7/2022 8:24 PM. Dr. Nick Roldan was contacted by me on 11/7/2022 8:33 PM and verbalized understanding of the critical result.     CTA Head Neck with Contrast    Addendum Date: 11/7/2022    Findings of unenhanced CT were discussed over the phone with Dr. Roldan on 11/07/2022 at 1801 CST.     Result  Date: 11/7/2022  EXAM: CTA HEAD NECK W CONTRAST, CT HEAD W/O CONTRAST LOCATION: [facility] DATE/TIME: 11/7/2022 5:42 PM INDICATION: Left-sided weakness and left facial droop. COMPARISON: None. CONTRAST: 75 ml Isovue 370 TECHNIQUE: Head and neck CT angiogram with IV contrast. Noncontrast head CT followed by axial helical CT images of the head and neck vessels obtained during the arterial phase of intravenous contrast administration. Axial 2D reconstructed images and multiplanar 3D MIP reconstructed images of the head and neck vessels were performed by the technologist. Dose reduction techniques were used. All stenosis measurements made according to NASCET criteria unless otherwise specified. FINDINGS: NONCONTRAST HEAD CT: INTRACRANIAL CONTENTS: No acute intracranial hemorrhage, extraaxial collection, or mass effect.  Scattered hypodensities within the bilateral periventricular, deep, and subcortical cerebral white matter, nonspecific although may be related to the chronic  microvascular ischemia. These limits the assessment for a small acute infarct. No evidence of an acute large vascular distribution transcortical infarct. Encephalomalacia within the right middle frontal gyrus, consistent with a chronic infarct. Multiple  bilateral punctate gyral calcifications. Mild generalized parenchymal volume loss. No acute hydrocephalus. VISUALIZED ORBITS/SINUSES/MASTOIDS: No acute intraorbital abnormality. No significant paranasal sinus or mastoid mucosal disease. BONES/SOFT TISSUES: No acute abnormality. Degenerative changes of the left temporomandibular joint. HEAD CTA: ANTERIOR CIRCULATION: No high-grade stenosis/occlusion, aneurysm, or high flow vascular malformation. A 2 mm right posterior communicating artery origin outpouching, likely an infundibulum. Standard [place] anatomy. POSTERIOR CIRCULATION: No high-grade stenosis/occlusion, aneurysm, or high flow vascular malformation.  Dominant left and smaller right vertebral artery contribute to a normal basilar artery. DURAL VENOUS SINUSES: Expected enhancement of the major dural venous sinuses. NECK CTA: RIGHT CAROTID: No measurable stenosis or dissection. LEFT CAROTID: No measurable stenosis or dissection. VERTEBRAL ARTERIES: No focal high-grade stenosis or dissection. Balanced vertebral arteries. AORTIC ARCH: Classic aortic arch anatomy with no significant stenosis at the origin of the great vessels. NONVASCULAR STRUCTURES: A right upper lobe pulmonary cyst and a calcified granuloma.     IMPRESSION: HEAD CT: 1.  No acute intracranial hemorrhage, extra-axial fluid collection, or mass effect. 2.  Bilateral cerebral white matter hypodensities. While they may represent the sequela of chronic microvascular ischemia, they remain age-indeterminate in the absence of prior exams for comparison and may mask a small acute infarct. An MRI of the brain may be helpful for further evaluation, as clinically indicated. 3.  Chronic changes, as above. HEAD CTA: 1.  No large vessel occlusion, high-grade stenosis, or aneurysm. NECK CTA: 1.  No carotid or vertebral artery hemodynamically significant stenosis, dissection, or pseudoaneurysm.    CT Chest w Contrast    Result Date: 11/10/2022  CT CHEST W CONTRAST 11/10/2022 10:17 AM CLINICAL HISTORY: SOB, CXR with opacities ?infection vs volume OL, BNP elevated but not clincally volume OL. Need CT to further eval. TECHNIQUE: CT chest with IV contrast. Multiplanar reformats were obtained. Dose reduction techniques were used. CONTRAST: 86mL isovue 370 COMPARISON: Chest radiograph 11/10/2022 and chest CT 11/8/2022. FINDINGS: LUNGS AND PLEURA: New mild patchy nodular opacities in the left lower lobe consistent with pneumonia. Lungs are otherwise clear. The central airways are patent.No pleural effusion. MEDIASTINUM/AXILLAE: Mild mediastinal lymphadenopathy has minimally changed since recent comparison. The largest left  paratracheal lymph node measures 1.4 cm. No aortic aneurysm. Aortic annulus and dense mitral annulus calcifications. CORONARY ARTERY CALCIFICATION: Moderate. UPPER ABDOMEN: Cholelithiasis. MUSCULOSKELETAL: Old left rib fractures.     IMPRESSION: 1.  Mild left lower lobe pneumonia. 2.  Mild nonspecific mediastinal lymphadenopathy without significant change since 11/8/2022. 3.  Cholelithiasis. ARABELLA ROLON MD   SYSTEM ID:  [id number]    Head CT w/o contrast    Addendum Date: 11/7/2022    Findings of unenhanced CT were discussed over the phone with Dr. Roldan on 11/07/2022 at 1801 CST.     Result Date: 11/7/2022  EXAM: CTA HEAD NECK W CONTRAST, CT HEAD W/O CONTRAST LOCATION: [facility] DATE/TIME: 11/7/2022 5:42 PM INDICATION: Left-sided weakness and left facial droop. COMPARISON: None. CONTRAST: 75 ml Isovue 370 TECHNIQUE: Head and neck CT angiogram with IV contrast. Noncontrast head CT followed by axial helical CT images of the head and neck vessels obtained during the arterial phase of intravenous contrast administration. Axial 2D reconstructed images and multiplanar 3D MIP reconstructed images of the head and neck vessels were performed by the technologist. Dose reduction techniques were used. All stenosis measurements made according to NASCET criteria unless otherwise specified. FINDINGS: NONCONTRAST HEAD CT: INTRACRANIAL CONTENTS: No acute intracranial hemorrhage, extraaxial collection, or mass effect.  Scattered hypodensities within the bilateral periventricular, deep, and subcortical cerebral white matter, nonspecific although may be related to the chronic  microvascular ischemia. These limits the assessment for a small acute infarct. No evidence of an acute large vascular distribution transcortical infarct. Encephalomalacia within the right middle frontal gyrus, consistent with a chronic infarct. Multiple  bilateral punctate gyral calcifications. Mild generalized parenchymal volume  loss. No acute hydrocephalus. VISUALIZED ORBITS/SINUSES/MASTOIDS: No acute intraorbital abnormality. No significant paranasal sinus or mastoid mucosal disease. BONES/SOFT TISSUES: No acute abnormality. Degenerative changes of the left temporomandibular joint. HEAD CTA: ANTERIOR CIRCULATION: No high-grade stenosis/occlusion, aneurysm, or high flow vascular malformation. A 2 mm right posterior communicating artery origin outpouching, likely an infundibulum. Standard [place] anatomy. POSTERIOR CIRCULATION: No high-grade stenosis/occlusion, aneurysm, or high flow vascular malformation. Dominant left and smaller right vertebral artery contribute to a normal basilar artery. DURAL VENOUS SINUSES: Expected enhancement of the major dural venous sinuses. NECK CTA: RIGHT CAROTID: No measurable stenosis or dissection. LEFT CAROTID: No measurable stenosis or dissection. VERTEBRAL ARTERIES: No focal high-grade stenosis or dissection. Balanced vertebral arteries. AORTIC ARCH: Classic aortic arch anatomy with no significant stenosis at the origin of the great vessels. NONVASCULAR STRUCTURES: A right upper lobe pulmonary cyst and a calcified granuloma.     IMPRESSION: HEAD CT: 1.  No acute intracranial hemorrhage, extra-axial fluid collection, or mass effect. 2.  Bilateral cerebral white matter hypodensities. While they may represent the sequela of chronic microvascular ischemia, they remain age-indeterminate in the absence of prior exams for comparison and may mask a small acute infarct. An MRI of the brain may be helpful for further evaluation, as clinically indicated. 3.  Chronic changes, as above. HEAD CTA: 1.  No large vessel occlusion, high-grade stenosis, or aneurysm. NECK CTA: 1.  No carotid or vertebral artery hemodynamically significant stenosis, dissection, or pseudoaneurysm.

## 2022-11-11 NOTE — PROGRESS NOTES
Internal Medicine Progress Note     Service Date: 11/11/2022  [facility] - Admission Date: 11/7/2022     Problems:     Patient Active Problem List   Diagnosis     Ischemic stroke (H)       Assessments and Plans:     A 78 yo male with a h/o HTN and aortic stenosis who confusion with speech disturbance, and family concern that he may have left sided weakness and a left facial droop, found to have acute multifocal ischemic CVA and a-fib. Hospital course complicated by sepsis from influenza with suspected bacterial superinfection and suspected undiagnosed COPD with exacerbation.     Acute, multifocal ischemic CVA 2/2 embolism related to a-fib: CT head showed b/l cerebral white matter hypodensities which may represent the sequela of chronic microvascular ischemia, but they remain age-indeterminate in the absence of prior exams for comparison and may mask a small acute infarct. CTA head neg for large vessel occlusion, high-grade stenosis, or aneurysm. CTA neck showed no e/o carotid or vertebral artery hemodynamically significant stenosis, dissection, or pseudoaneurysm. MRI brain showed numerous punctate scattered supratentorial and infratentorial acute infarcts, specifically involving the right hippocampus; this distribution is suspicious for a thromboembolic etiology; no significant mass effect or hemorrhagic conversion. CT C/A/P was obtained given 3 T sign was suspicious for  possible underlying malignancy and showed non-specific CHANDLER as below. EKG showed rate controlled a-fib. Echo showed preserved EF, mild LVH, no LV thrombus, neg bubble study, moderate to severe MR, moderate MS, severe aortic stenosis, and mild pulmonary HTN with PAP 35-45mmHg. Total cholesterol 135, TG 66 HDL 53, LDL 69, A1c 5.0%.   -Stroke neurology consult appreciated. Recommended limited permissive HTN with goal SBP <180, continue lipitor at 10mg daily, starting eliquis, and no need for ASA if starting AC.    -Telemetry  -Cont eliquis, lipitor  -Holding PTA antihypertensives for now given patient's BP is normotensive to borderline low intermittently, labetalol prn. Outside window to need permissive HTN now.   -Neuro checks  -PT, OT, SLP consulted  -Will need f/u with neurology in 6-8 weeks ([phone])   -Plan for ARU once medically stable for discharge    A-fib, new diagnosis:    -Spoke with cardiology 11/8 after echo resulted and showed MS given possible need for warfarin instead of NOAC. Per cardiology, given that the mitral valve is calcified, NOAC is fine. If this was non-calcified / rheumatic MS, then patient would need to be on warfarin.   -Cont eliquis    Sepsis 2/2 influenza A with suspected superimposed bacterial pneumonia: Patient with reported fever of 103 (not documented as far as I can see) and tachycardic on 11/10. CXR with increased interstitial and hazy opacities in both lungs (L>R); non-specific, but could be infectious or inflammatory in etiology; pulmonary edema is another possibility. BNP elevated but patient is not clinically volume OL. PCT 0.55. CT chest obtained to further clarify CXR and this showed LLL pneumonia and persistent mediastinal CHANDLER as below, but no e/o volume OL. Strep pnemo and legionella urinary ag neg. 1/2 BCx bottles from 11/10 isolated staph epi which is a contaminate. No leukocytosis.   -Cont ceftriaxone, azithromycin  -Discontinue vancomycin  -Cont IVF  -Cont mucinex, prn tessalon  -Sputum cx if able to expectorate  -Trend WBC and fever curve    SOB: 2/2 influenza with suspected bacterial superinfection and probably undiagnosed COPD with acute exacerbation. Patient not requiring supplemental oxygen currently and has not desaturated during this admission thus far.   -Start prednisone 40mg daily  -Cont duonebs, prn albuterol  -Abx and tamiflu as above  -02 prn to maintain sats >88%    Elevated troponin, likely 2/2 demand ischemia / type II NSTEMI: EKG w/o ischemic changes.  Trop peaked at 374, then trended down. Denies CP or SOB, but patient is not a reliable historian given suspected expressive aphasia.   -Spoke with cardiology at [facility] 11/8 given complexity of the situation due to patient's inability to give reliable history. Cardiology agreed that there were no ischemic changes on EKG. Advised that this is likely demand ischemia, but recommended treating as an NSTEMI with starting a heparin gtt if there were WMA on echo or LV thrombus. If neither or these were noted, no need for heparin gtt and recommended starting eliquis as above. Given echo w/o WMA or LV thrombus, no indication for heparin gtt.    Acute metabolic encephalopathy vs expressive aphasia (latter is suspected): Ammonia, TSH and B12 all okay. UA neg for infection. CT/MRI brain as above. CXR w/o infiltrate.  -Resolved, mental status back to suspected baseline   -Monitor mental status    Mediastinal lymphadenopathy: CT C/A/P notable for several prominent to mildly enlarged nonspecific mediastinal lymph nodes.  -Will need repeat CT to re-evaluate as an o/p and if the CHANDLER persists / worsens, will need to consider biopsy    Mild hyponatremia:  -Resolved    -Trend na    HTN: BP currently normotensive to borderline low.   -Holding PTA hydrochlorothiazide, toprol, lisinopril, amlodipine as above    Chronic anemia: H/H is somewhat below prior baseline, but no labs available since 2021 and Hb at that time was 12. No e/o active bleeding. B12 and folate both WNL. Iron studies are consistent with GILDA. H/H is trending down somewhat, but remains above transfusion threshold.  -Check stool guaiac  -Hold off on venofer given that this can occasionally cause significant HTN and patient has had an acute CVA as above  -Cont ferrous sulfate  -Trend H/H    Microscopic hematuria: Likely 2/2 traumatic charles catheter placement.  -Monitor    Urinary retention: UA neg for infection.  -Cont charles catheter  -Cont flomax    Aortic stenosis:  "Echo as above.  -Will need o/p cardiology f/u    DVT Prophylaxis: Eliquis    Code Status: Full    Dispo: ARU once medically stable       Subjective:      Patient reports feeling better today. SOB has improved. Continues to have a cough. Denies CP, abd pain, N/V/D, chills, diaphoresis, rash, HA, or left sided weakness.     Objective:      Vitals: /69 (BP Location: Right arm)   Pulse 93   Temp 98.9  F (37.2  C)   Resp 20   Ht 1.753 m (5' 9\")   Wt 82.8 kg (182 lb 8.7 oz)   SpO2 92%   BMI 26.96 kg/m  Temp (24hrs), Av.3  F (36.8  C), Min:97.8  F (36.6  C), Max:98.7  F (37.1  C)    Gen: Alert + oriented X 3, no acute distress  Eyes: No scleral icterus  Neck: No JVD  ENT: MMM  Heart: RRR, clear S1S2, no rubs or gallops, +systolic murmur  Lungs: Scattered wheeze  Abdomen: Normal bowel sounds, soft, no tenderness to palpation  Extremities: No lower extremity edema  Skin: No rash  Neuro: No left facial droop noted this AM, reduced left hand  has resolved, speech normalized, power 5/5 all extremities, sensation grossly intact throughout  Psych: Appropriate affect / mood    I have reviewed all labs, imaging and other investigations.     Labs/Imaging:     Results for orders placed or performed during the hospital encounter of 22 (from the past 24 hour(s))   CT Chest w Contrast    Narrative    CT CHEST W CONTRAST 11/10/2022 10:17 AM    CLINICAL HISTORY: SOB, CXR with opacities ?infection vs volume OL, BNP  elevated but not clincally volume OL. Need CT to further eval.  TECHNIQUE: CT chest with IV contrast. Multiplanar reformats were  obtained. Dose reduction techniques were used.    CONTRAST: 86mL isovue 370    COMPARISON: Chest radiograph 11/10/2022 and chest CT 2022.    FINDINGS:   LUNGS AND PLEURA: New mild patchy nodular opacities in the left lower  lobe consistent with pneumonia. Lungs are otherwise clear. The central  airways are patent.No pleural effusion.    MEDIASTINUM/AXILLAE: Mild " mediastinal lymphadenopathy has minimally  changed since recent comparison. The largest left paratracheal lymph  node measures 1.4 cm. No aortic aneurysm. Aortic annulus and dense  mitral annulus calcifications.    CORONARY ARTERY CALCIFICATION: Moderate.    UPPER ABDOMEN: Cholelithiasis.    MUSCULOSKELETAL: Old left rib fractures.      Impression    IMPRESSION:   1.  Mild left lower lobe pneumonia.  2.  Mild nonspecific mediastinal lymphadenopathy without significant  change since 11/8/2022.  3.  Cholelithiasis.    ARABELLA ROLON MD         SYSTEM ID:  [id number]   Glucose by meter   Result Value Ref Range    GLUCOSE BY METER POCT 146 (H) 70 - 99 mg/dL   UA reflex to Microscopic and Culture    Specimen: Urine, Ortega Catheter   Result Value Ref Range    Color Urine Ketty (A) Colorless, Straw, Light Yellow, Yellow    Appearance Urine Slightly Cloudy (A) Clear    Glucose Urine Negative Negative mg/dL    Bilirubin Urine Small (A) Negative    Ketones Urine Negative Negative mg/dL    Specific Gravity Urine 1.025 1.003 - 1.035    Blood Urine Large (A) Negative    pH Urine 5.0 5.0 - 7.0    Protein Albumin Urine 100 (A) Negative mg/dL    Urobilinogen Urine 2.0 Normal, 2.0 mg/dL    Nitrite Urine Negative Negative    Leukocyte Esterase Urine Negative Negative    Mucus Urine Present (A) None Seen /LPF    RBC Urine >182 (H) <=2 /HPF    WBC Urine 0 <=5 /HPF    Narrative    Urine Culture not indicated   Glucose by meter   Result Value Ref Range    GLUCOSE BY METER POCT 126 (H) 70 - 99 mg/dL   Glucose by meter   Result Value Ref Range    GLUCOSE BY METER POCT 161 (H) 70 - 99 mg/dL   CBC with platelets   Result Value Ref Range    WBC Count 9.2 4.0 - 11.0 10e3/uL    RBC Count 3.01 (L) 4.40 - 5.90 10e6/uL    Hemoglobin 9.5 (L) 13.3 - 17.7 g/dL    Hematocrit 28.4 (L) 40.0 - 53.0 %    MCV 94 78 - 100 fL    MCH 31.6 26.5 - 33.0 pg    MCHC 33.5 31.5 - 36.5 g/dL    RDW 13.2 10.0 - 15.0 %    Platelet Count 178 150 - 450 10e3/uL   Extra  Tube    Narrative    The following orders were created for panel order Extra Tube.  Procedure                               Abnormality         Status                     ---------                               -----------         ------                     Extra Green Top (Lithium...[[id number]]                      Final result                 Please view results for these tests on the individual orders.   Extra Green Top (Lithium Heparin) Tube   Result Value Ref Range    Hold Specimen [facility]    Glucose by meter   Result Value Ref Range    GLUCOSE BY METER POCT 122 (H) 70 - 99 mg/dL         Eden Jordan MD  11/11/2022 10:14 AM

## 2022-11-11 NOTE — PHARMACY-VANCOMYCIN DOSING SERVICE
Pharmacy Vancomycin Initial Note  Date of Service 2022  Patient's  1945  77 year old, male    Indication: Bacteremia    Current estimated CrCl = Estimated Creatinine Clearance: 80.9 mL/min (based on SCr of 0.84 mg/dL).    Creatinine for last 3 days  2022:  6:03 AM Creatinine 0.84 mg/dL    Recent Vancomycin Level(s) for last 3 days  No results found for requested labs within last 72 hours.      Vancomycin IV Administrations (past 72 hours)      No vancomycin orders with administrations in past 72 hours.                Nephrotoxins and other renal medications (From now, onward)    Start     Dose/Rate Route Frequency Ordered Stop    22 1430  vancomycin (VANCOCIN) 750 mg in sodium chloride 0.9 % 250 mL intermittent infusion         750 mg  over 60-90 Minutes Intravenous EVERY 12 HOURS 22 0250      22 0300  vancomycin (VANCOCIN) 1,500 mg in sodium chloride 0.9 % 250 mL intermittent infusion         1,500 mg  over 90 Minutes Intravenous ONCE 22 0236      22 1730  [Held by provider]  lisinopril (ZESTRIL) tablet 40 mg        (Held by provider since 2022 at 0746 by Eden Jordan MD.Hold Reason: Other)    40 mg Oral EVERY EVENING 22 0302            Contrast Orders - past 72 hours (72h ago, onward)    Start     Dose/Rate Route Frequency Stop    11/10/22 1030  iopamidol (ISOVUE-370) solution 86 mL         86 mL Intravenous ONCE 11/10/22 1008    22 1500  perflutren diluted 1mL to 2mL with saline (OPTISON) diluted injection 2 mL         2 mL Intravenous ONCE 22 1445    22 1000  iopamidol (ISOVUE-370) solution 78 mL         78 mL Intravenous ONCE 22 0935          [employer] Prediction of Planned Initial Vancomycin Regimen  Loading dose: 1500 mg at 03:00 2022.  Regimen: 750 mg IV every 12 hours.  Start time: 02:50 on 2022  Exposure target: AUC24 (range)400-600 mg/L.hr   AUC24,ss: 447 mg/L.hr  Probability of AUC24 > 400: 62  %  Ctrough,ss: 14.7 mg/L  Probability of Ctrough,ss > 20: 23 %  Probability of nephrotoxicity (Lodise LEOBARDO 2009): 10 %          Plan:  1. Start vancomycin  1500 mg IV load then 750mg IV  q12h.   2. Vancomycin monitoring method: AUC  3. Vancomycin therapeutic monitoring goal: 400-600 mg*h/L  4. Pharmacy will check vancomycin levels as appropriate in 1-3 Days.    5. Serum creatinine levels will be ordered daily for the first week of therapy and at least twice weekly for subsequent weeks.      Luanne Baldwin, [facility]

## 2022-11-12 NOTE — PROGRESS NOTES
Updated provider on patients increased work of breathing and courseness.  His respiratory rate is now 40, sats were 79% on RA, improved to 91% on 6 L NC. Unable to perform IS past 500 due to work of breathing.      Per MD Xray was ordered, lasix given, and fluids held until xray resulted.

## 2022-11-12 NOTE — PROVIDER NOTIFICATION
New oncoming Provider Dr. Montenegro notified and updated on xray results, output from lasix, continued work of breathing with respiratory rate >40.  Patients heart rate also up to 170 bpm in afib with sats droping requiring increase in oxygen to oxymask at 15 L.  After updating provider patient was verbally ordered to be changed to ICU, given Cardizem, and additional lasix.

## 2022-11-12 NOTE — PROGRESS NOTES
Internal Medicine Progress Note     Service Date: 11/12/2022  [facility] - Admission Date: 11/7/2022     Problems:     Patient Active Problem List   Diagnosis     Ischemic stroke (H)       Assessments and Plans:     A 78 yo male with a h/o HTN and aortic stenosis who p/w confusion with speech disturbance, and family concern that he may have left sided weakness and a left facial droop, found to have acute multifocal ischemic CVA and a-fib. Hospital course complicated by a-fib with RVR, sepsis from influenza with suspected bacterial superinfection, and acute hypoxic respiratory failure 2/2 influenza with suspected bacterial pneumonia, pulmonary edema (likely 2/2 a-fib with RVR) and suspected undiagnosed COPD with exacerbation.     Acute, multifocal ischemic CVA 2/2 embolism related to a-fib: CT head showed b/l cerebral white matter hypodensities which may represent the sequela of chronic microvascular ischemia, but they remain age-indeterminate in the absence of prior exams for comparison and may mask a small acute infarct. CTA head neg for large vessel occlusion, high-grade stenosis, or aneurysm. CTA neck showed no e/o carotid or vertebral artery hemodynamically significant stenosis, dissection, or pseudoaneurysm. MRI brain showed numerous punctate scattered supratentorial and infratentorial acute infarcts, specifically involving the right hippocampus; this distribution is suspicious for a thromboembolic etiology; no significant mass effect or hemorrhagic conversion. CT C/A/P was obtained given 3 T sign was suspicious for  possible underlying malignancy and showed non-specific CHANDLER as below. EKG showed rate controlled a-fib. Echo showed preserved EF, mild LVH, no LV thrombus, neg bubble study, moderate to severe MR, moderate MS, severe aortic stenosis, and mild pulmonary HTN with PAP 35-45mmHg. Total cholesterol 135, TG 66 HDL 53, LDL 69, A1c 5.0%.   -Stroke neurology consult appreciated.  Recommended limited permissive HTN with goal SBP <180, continue lipitor at 10mg daily, eliquis, and no need for ASA if starting AC.   -Telemetry  -Cont eliquis, lipitor  -Outside window to need permissive HTN at this stage   -Neuro checks  -PT, OT, SLP consulted  -Will need f/u with neurology in 6-8 weeks ([phone])   -Plan for ARU once medically stable for discharge    A-fib (new diagnosis) with RVR: Patient developed RVR AM of 11/12. Patient was given cardizem 20mg IV X 1, then started on gtt at 5mg/hr.     -Telemetry  -Cardizem gtt now on hold given improved HR with lopressor (started 25mg BID this AM)  -Increase lopressor to 50mg BID (ordered extra 25mg X 1 now)  -Cont eliquis   -Keep k >4 and mg >2 (will check k and mg levels now)  -Spoke with cardiology 11/8 after echo resulted and showed MS given possible need for warfarin instead of NOAC. Per cardiology, given that the mitral valve is calcified, NOAC is fine. If this was non-calcified / rheumatic MS, then patient would need to be on warfarin.     Sepsis 2/2 influenza A with suspected superimposed bacterial pneumonia: Patient with reported fever of 103 (not documented as far as I can see) and tachycardic on 11/10. CXR with increased interstitial and hazy opacities in both lungs (L>R); non-specific, but could be infectious or inflammatory in etiology; pulmonary edema is another possibility. BNP elevated 11/10, but patient is not clinically volume OL at that time. CT chest obtained to further clarify CXR and this showed LLL pneumonia and persistent mediastinal CHANDLER as below, but no e/o volume OL. Strep pnemo and legionella urinary ag neg. 1/2 BCx bottles from 11/10 isolated staph epi which is a contaminate and vancomycin was discontinued. PCT 0.55. Afebrile past 36H.  -Cont ceftriaxone, azithromycin  -Stopped IVF this AM as below  -Cont mucinex, prn tessalon  -Sputum cx if able to expectorate  -Trend WBC and fever curve    Acute hypoxic respiratory failure:  Multifactorial and related to influenza with suspected bacterial superinfection, suspected undiagnosed COPD with acute exacerbation, and pulmonary edema. Suspect pulmonary edema developed as a result of a-fib with RVR. Stat CXR this AM for respiratory distress showed persistent LLL pneumonia and increased interstitial markings consistent with congestion and edema. ABG showed pH 7.48, pC02 34, p02 72. Patient had been up to 10L via oxy mask, now weaned to 1.5L NC.  -Changed prednisone to solumedrol given worsening wheeze and respiratory distress  -Cont duonebs, prn albuterol  -Abx and tamiflu as above  -Stopped IVF  -Cont lasix at 40mg IV BID for now  -Limited echo ordered to recheck EF  -02 prn to maintain sats >88%    Elevated troponin, likely 2/2 demand ischemia / type II NSTEMI: EKG w/o ischemic changes. Trop peaked at 374, then trended down. Denied CP or SOB earlier in hospital course, but patient was not a reliable historian at that time given suspected expressive aphasia.   -Spoke with cardiology at the [facility] 11/8 given complexity of the situation due to patient's inability to give reliable history. Cardiology agreed that there were no ischemic changes on EKG. Advised that this is likely demand ischemia, but recommended treating as an NSTEMI with starting a heparin gtt if there were WMA on echo or LV thrombus. If neither or these were noted, no need for heparin gtt and recommended starting eliquis as above. Given echo w/o WMA or LV thrombus, no indication for heparin gtt.    Acute metabolic encephalopathy vs expressive aphasia (latter suspected earlier in hospital course): Ammonia, TSH and B12 all okay. UA neg for infection. CT/MRI brain as above.   -Patient transiently confused this AM while in respiratory distress and likely 2/2 hypoxia, now back to baseline with improved respiratory status  -Monitor mental status    Mediastinal lymphadenopathy: CT C/A/P notable for several prominent to mildly enlarged  "nonspecific mediastinal lymph nodes.  -Will need repeat CT to re-evaluate as an o/p and if the CHANDLER persists / worsens, will need to consider biopsy    Mild hyponatremia:  -Resolved    -Trend na    HTN: BP currently normotensive to borderline low.   -Started lopressor 50mg BID as above  -Holding PTA hydrochlorothiazide, toprol, lisinopril, amlodipine for now    Chronic anemia: H/H is somewhat below prior baseline, but no labs available since  and Hb at that time was 12. No e/o active bleeding. B12 and folate both WNL. Iron studies are consistent with GILDA. H/H is trending down somewhat, but remains above transfusion threshold.  -Check stool guaiac  -Hold off on venofer given that this can occasionally cause significant HTN and patient has had an acute CVA as above  -Cont ferrous sulfate  -Trend H/H - CBC from this AM is awaited    Microscopic hematuria: Likely 2/2 traumatic charles catheter placement.  -Monitor    Urinary retention: UA neg for infection.  -Cont charles catheter  -Cont flomax    Aortic stenosis: Echo as above.  -Will need o/p cardiology f/u    DVT Prophylaxis: Eliquis    Code Status: Full    Dispo: Plan for ARU once medically stable       Subjective:      Patient reports feeling better okay currently. Reports having significant SOB earlier, but this is much better now. Continues to have a cough. Denies CP, abd pain, N/V/D, chills, diaphoresis, rash, HA, or left sided weakness.     Objective:      Vitals: /75   Pulse 104   Temp 97.7  F (36.5  C) (Oral)   Resp (!) 31   Ht 1.753 m (5' 9\")   Wt 82.8 kg (182 lb 8.7 oz)   SpO2 96%   BMI 26.96 kg/m  Temp (24hrs), Av.3  F (36.8  C), Min:97.8  F (36.6  C), Max:98.7  F (37.1  C)    Gen: Alert + oriented X 3, no acute distress  Eyes: No scleral icterus  Neck: No JVD  ENT: MMM  Heart: RRR, clear S1S2, no rubs or gallops, +systolic murmur  Lungs: Diffuse wheeze, reduced a/e at bases b/l  Abdomen: Normal bowel sounds, soft, no tenderness to " palpation  Extremities: No lower extremity edema  Skin: No rash  Neuro: Left facial droop now resolved, reduced left hand  has resolved, speech normalized, power 5/5 all extremities, sensation grossly intact throughout  Psych: Appropriate affect / mood    I have reviewed all labs, imaging and other investigations.     Labs/Imaging:     Results for orders placed or performed during the hospital encounter of 11/07/22 (from the past 24 hour(s))   Glucose by meter   Result Value Ref Range    GLUCOSE BY METER POCT 143 (H) 70 - 99 mg/dL   Glucose by meter   Result Value Ref Range    GLUCOSE BY METER POCT 143 (H) 70 - 99 mg/dL   CBC with platelets   Result Value Ref Range    WBC Count 10.5 4.0 - 11.0 10e3/uL    RBC Count 3.42 (L) 4.40 - 5.90 10e6/uL    Hemoglobin 10.7 (L) 13.3 - 17.7 g/dL    Hematocrit 32.0 (L) 40.0 - 53.0 %    MCV 94 78 - 100 fL    MCH 31.3 26.5 - 33.0 pg    MCHC 33.4 31.5 - 36.5 g/dL    RDW 13.2 10.0 - 15.0 %    Platelet Count 248 150 - 450 10e3/uL   Extra Tube    Narrative    The following orders were created for panel order Extra Tube.  Procedure                               Abnormality         Status                     ---------                               -----------         ------                     Extra Green Top (Lithium...[[id number]]                      Final result                 Please view results for these tests on the individual orders.   Extra Green Top (Lithium Heparin) Tube   Result Value Ref Range    Hold Specimen JIC    XR Chest Port 1 View    Narrative    EXAM: XR CHEST PORT 1 VIEW  LOCATION: [facility]  DATE/TIME: 11/12/2022 5:39 AM    INDICATION: resp. rate 40, increased oxygen demands, possible fluid overload.  COMPARISON: 11/10/2022.      Impression    IMPRESSION: Persistent left lower lobe infiltrate consistent with pneumonia. Increased interstitial markings consistent with congestion and edema. Normal heart size. No pneumothorax.   Blood  gas arterial   Result Value Ref Range    pH Arterial 7.48 (H) 7.35 - 7.45    pCO2 Arterial 34 (L) 35 - 45 mm Hg    pO2 Arterial 72 (L) 80 - 105 mm Hg    FIO2 2     Bicarbonate Arterial 25 21 - 28 mmol/L    Base Excess/Deficit (+/-) 1.5 -9.0 - 1.8 mmol/L   Glucose by meter   Result Value Ref Range    GLUCOSE BY METER POCT 217 (H) 70 - 99 mg/dL         Eden Jordan MD  11/12/2022 12:15 PM

## 2022-11-12 NOTE — PROGRESS NOTES
Dr. Montenegro on unit and did visually see and address patients condition.  He has had good output from lasix.  Cardizem bolus has been given and drip started.  Heart rate is 110-120 still in afib. Patient is displaying increased confusion from prior as he is removing his oxygen mask, pulling at his charles, and attempted to get out of bed. Patient reoriented and reassured.  BA is on.

## 2022-11-12 NOTE — PLAN OF CARE
Goal Outcome Evaluation:      Pt maintainingg O2 sats on RA, 93-96%. LS slight coarse. VSS, afebrile. HR continues to be in Afib, HR . Ortega draining clear yellow urine. A & O x 2, confused to situation & place. Bed alarm on and audible for safety. Up to bsc with assist 1 for med BM. Call [employer] reach. Spouse calling many many times through out day shift. Tita Nj RN BSN

## 2022-11-13 NOTE — PROGRESS NOTES
Care Management Follow Up    Length of Stay (days): 6    Expected Discharge Date: 11/11/2022     Concerns to be Addressed: discharge planning   Patient plan of care discussed at interdisciplinary rounds: Yes    Anticipated Discharge Disposition: Acute Rehab     Anticipated Discharge Services: Transportation Services    Patient/family educated on Medicare website which has current facility and service quality ratings: yes  Education Provided on the Discharge Plan:    Patient/Family in Agreement with the Plan: yes    Referrals Placed by CM/SW: External Care Coordination, Post Acute Facilities, Transportation  Private pay costs discussed: transportation costs    Additional Information:    Per IDT rounds today, MD team states that patient is not medically stable for discharge today, likely 2 more days inpatient.     [facility], (P:[phone]/F: [phone]). Per Luanne, patient remains ARU appropriate and is accepted to [facility] for cares. They will continue to follow him through EMR review during hospital stay.    PLAN: [facility]     LITA SIMON RN

## 2022-11-13 NOTE — PLAN OF CARE
Goal Outcome Evaluation:pt up in chair, pleasant. No deficits noted or stated.pt denies pain. Pt talking on the phone at preent. Will assess how he is able to eat his supper.

## 2022-11-13 NOTE — PROGRESS NOTES
Internal Medicine Progress Note     Service Date: 11/13/2022  [facility] - Admission Date: 11/7/2022     Problems:     Patient Active Problem List   Diagnosis     Ischemic stroke (H)       Assessments and Plans:     A 76 yo male with a h/o HTN and aortic stenosis who p/w confusion with speech disturbance, and family concern that he may have left sided weakness and a left facial droop, found to have acute multifocal ischemic CVA and a-fib. Hospital course complicated by a-fib with RVR, sepsis from influenza with suspected bacterial superinfection, and acute hypoxic respiratory failure 2/2 influenza with suspected bacterial pneumonia, pulmonary edema (likely 2/2 a-fib with RVR) and suspected undiagnosed COPD with exacerbation.     Acute, multifocal ischemic CVA 2/2 embolism related to a-fib: CT head showed b/l cerebral white matter hypodensities which may represent the sequela of chronic microvascular ischemia, but they remain age-indeterminate in the absence of prior exams for comparison and may mask a small acute infarct. CTA head neg for large vessel occlusion, high-grade stenosis, or aneurysm. CTA neck showed no e/o carotid or vertebral artery hemodynamically significant stenosis, dissection, or pseudoaneurysm. MRI brain showed numerous punctate scattered supratentorial and infratentorial acute infarcts, specifically involving the right hippocampus; this distribution is suspicious for a thromboembolic etiology; no significant mass effect or hemorrhagic conversion. CT C/A/P was obtained given 3 T sign was suspicious for  possible underlying malignancy and showed non-specific CHANDLER as below. EKG showed rate controlled a-fib. Echo showed preserved EF, mild LVH, no LV thrombus, neg bubble study, moderate to severe MR, moderate MS, severe aortic stenosis, and mild pulmonary HTN with PAP 35-45mmHg. Total cholesterol 135, TG 66 HDL 53, LDL 69, A1c 5.0%.   -Stroke neurology consult appreciated.  Recommended limited permissive HTN with goal SBP <180, continue lipitor at 10mg daily, eliquis, and no need for ASA if starting AC.   -Telemetry  -Cont eliquis, lipitor  -Outside window to need permissive HTN at this stage   -Neuro checks  -PT, OT, SLP consulted  -Will need f/u with neurology in 6-8 weeks ([phone])   -Plan for ARU once medically stable for discharge    A-fib (new diagnosis) with RVR: Patient developed RVR AM of 11/12. Patient was given cardizem 20mg IV X 1, then started on gtt at 5mg/hr. Patient was then started on lopressor and this was titrated up 11/12, enabling discontinuation of cardizem gtt.     -Telemetry  -Cont lopressor 50mg BID (on toprol 100mg daily PTA)  -Cont eliquis   -Keep k >4 and mg >2   -Spoke with cardiology 11/8 after echo resulted and showed MS given possible need for warfarin instead of NOAC. Per cardiology, given that the mitral valve is calcified, NOAC is fine. If this was non-calcified / rheumatic MS, then patient would need to be on warfarin.     Sepsis 2/2 influenza A with suspected superimposed bacterial pneumonia: Patient with reported fever of 103 (not documented as far as I can see) and tachycardic on 11/10. CXR with increased interstitial and hazy opacities in both lungs (L>R); non-specific, but could be infectious or inflammatory in etiology; pulmonary edema is another possibility. BNP elevated 11/10, but patient was not clinically volume OL at that time. CT chest obtained to further clarify CXR and this showed LLL pneumonia and persistent mediastinal CHANDLER as below, but no e/o volume OL. Strep pnemo and legionella urinary ag neg. 1/2 BCx bottles from 11/10 isolated staph epi which is a contaminate and vancomycin was discontinued. PCT 0.55. Now afebrile and no leukocytosis.   -Cont ceftriaxone, azithromycin  -Cont mucinex, prn tessalon  -Sputum cx ordered, but patient unable to expectorate  -Trend WBC and fever curve    Acute hypoxic respiratory failure:  Multifactorial and related to influenza with suspected bacterial superinfection, suspected undiagnosed COPD with acute exacerbation, and pulmonary edema. Suspect pulmonary edema developed as a result of a-fib with RVR. Stat CXR 11/12 for respiratory distress showed persistent LLL pneumonia and increased interstitial markings consistent with congestion and edema. ABG showed pH 7.48, pC02 34, p02 72. Patient had been up to 10L via oxy mask, now weaned off of supplemental oxygen with lasix diuresis.  -Cont solumedrol 40mg IV BID for now, can likely transition to prednisone in AM  -Cont duonebs, prn albuterol  -Abx and tamiflu as above  -Discontinue lasix 40mg IV BID, start lasix 20mg daily  -Limited echo ordered to recheck EF  -02 prn to maintain sats >88%    Elevated troponin, likely 2/2 demand ischemia / type II NSTEMI: EKG w/o ischemic changes. Trop peaked at 374, then trended down. Denied CP or SOB earlier in hospital course, but patient was not a reliable historian at that time given suspected expressive aphasia.   -Spoke with cardiology at the [facility] 11/8 given complexity of the situation due to patient's inability to give reliable history. Cardiology agreed that there were no ischemic changes on EKG. Advised that this is likely demand ischemia, but recommended treating as an NSTEMI with starting a heparin gtt if there were WMA on echo or LV thrombus. If neither or these were noted, no need for heparin gtt and recommended starting eliquis as above. Given echo w/o WMA or LV thrombus, no indication for heparin gtt.    Acute metabolic encephalopathy: Ammonia, TSH and B12 all okay. UA neg for infection. CT/MRI brain as above. Patient transiently confused 11/12 while in respiratory distress and likely 2/2 hypoxia, now back to baseline with improved respiratory status.  -Monitor mental status    Mediastinal lymphadenopathy: CT C/A/P notable for several prominent to mildly enlarged nonspecific mediastinal lymph  "nodes.  -Will need repeat CT to re-evaluate as an o/p and if the CHANDLER persists / worsens, will need to consider biopsy    HTN:  -On lopressor 50mg BID as above  -Holding PTA hydrochlorothiazide, toprol, lisinopril, amlodipine for now given BP normotensive to borderline low on lopressor alone    Chronic anemia: H/H is somewhat below prior baseline, but no labs available since  and Hb at that time was 12. B12 and folate both WNL. Iron studies are consistent with GILDA. No e/o active bleeding. H/H stable.  -Stool guaiac ordered, but never collected  -Hold off on venofer given that this can occasionally cause significant HTN and patient has had an acute CVA as above  -Cont ferrous sulfate  -Trend H/H     Microscopic hematuria: Likely 2/2 traumatic charles catheter placement.  -Monitor    Urinary retention: UA neg for infection.  -Cont charles catheter today, discontinue charles catheter in AM for voiding trial  -Cont flomax    Aortic stenosis: Echo as above.  -Will need o/p cardiology f/u    DVT Prophylaxis: Eliquis    Code Status: Full    Dispo: Plan for ARU per therapy recs once medically stable, likely 2-3 days      Subjective:      Patient reports feeling better this AM. SOB persists, but is a lot better compared to yesterday. Ongoing dry cough and wheeze reported. Denies CP, abd pain, N/V/D, chills, diaphoresis, rash, HA, or left sided weakness.     Objective:      Vitals: /86 (BP Location: Right arm)   Pulse 81   Temp 97.9  F (36.6  C) (Oral)   Resp 18   Ht 1.753 m (5' 9\")   Wt 80.4 kg (177 lb 4 oz)   SpO2 98%   BMI 26.18 kg/m  Temp (24hrs), Av.3  F (36.8  C), Min:97.8  F (36.6  C), Max:98.7  F (37.1  C)    Gen: Alert + oriented X 3, no acute distress  Eyes: No scleral icterus  Neck: No JVD  ENT: MMM  Heart: RRR, clear S1S2, no rubs or gallops, +systolic murmur  Lungs: Diffuse wheeze (improving)  Abdomen: Normal bowel sounds, soft, no tenderness to palpation  Extremities: No lower extremity " edema  Skin: No rash  Neuro: Left facial droop now resolved, reduced left hand  has resolved, speech normalized, power 5/5 all extremities, sensation grossly intact throughout  Psych: Appropriate affect / mood    I have reviewed all labs, imaging and other investigations.     Labs/Imaging:     Results for orders placed or performed during the hospital encounter of 11/07/22 (from the past 24 hour(s))   Glucose by meter   Result Value Ref Range    GLUCOSE BY METER POCT 217 (H) 70 - 99 mg/dL   Basic metabolic panel   Result Value Ref Range    Sodium 133 (L) 136 - 145 mmol/L    Potassium 3.9 3.4 - 5.3 mmol/L    Chloride 99 98 - 107 mmol/L    Carbon Dioxide (CO2) 25 22 - 29 mmol/L    Anion Gap 9 7 - 15 mmol/L    Urea Nitrogen 24.1 (H) 8.0 - 23.0 mg/dL    Creatinine 0.85 0.67 - 1.17 mg/dL    Calcium 8.3 (L) 8.8 - 10.2 mg/dL    Glucose 203 (H) 70 - 99 mg/dL    GFR Estimate 88 >60 mL/min/1.73m2   Magnesium   Result Value Ref Range    Magnesium 2.0 1.7 - 2.3 mg/dL   Glucose by meter   Result Value Ref Range    GLUCOSE BY METER POCT 152 (H) 70 - 99 mg/dL   CBC with platelets   Result Value Ref Range    WBC Count 8.6 4.0 - 11.0 10e3/uL    RBC Count 3.35 (L) 4.40 - 5.90 10e6/uL    Hemoglobin 10.6 (L) 13.3 - 17.7 g/dL    Hematocrit 30.9 (L) 40.0 - 53.0 %    MCV 92 78 - 100 fL    MCH 31.6 26.5 - 33.0 pg    MCHC 34.3 31.5 - 36.5 g/dL    RDW 12.8 10.0 - 15.0 %    Platelet Count 256 150 - 450 10e3/uL   Basic metabolic panel   Result Value Ref Range    Sodium 138 136 - 145 mmol/L    Potassium 4.0 3.4 - 5.3 mmol/L    Chloride 101 98 - 107 mmol/L    Carbon Dioxide (CO2) 28 22 - 29 mmol/L    Anion Gap 9 7 - 15 mmol/L    Urea Nitrogen 28.9 (H) 8.0 - 23.0 mg/dL    Creatinine 0.80 0.67 - 1.17 mg/dL    Calcium 8.5 (L) 8.8 - 10.2 mg/dL    Glucose 151 (H) 70 - 99 mg/dL    GFR Estimate 90 >60 mL/min/1.73m2   Magnesium   Result Value Ref Range    Magnesium 2.2 1.7 - 2.3 mg/dL         Eden Jordan MD  11/13/2022 10:05 AM

## 2022-11-13 NOTE — PROGRESS NOTES
Patient slept well overnight and has remained on a room air.  Lung sounds improving and have crackles now and far less course.  He did try to crawl out of bed, setting off the bed alarm on evenings, but since then has been appropriate.  Ortega continues to drain urine.  HR has been controlled overnight. TEAGAN HERNANDEZ  BA on.

## 2022-11-14 NOTE — PROGRESS NOTES
End Of Shift Note        Plan: Pos discharge, monitor for CVA symptoms    Subjective/Objective:    Neuro: A/Ox3, does not use call light to get out of bed    Cardiac: normotensive, no edema    Resp: RA, wheezing on right side    GI/: charles     MSK: able to walk with 1 assist    Skin: nothing new to note    LDAs: PIV

## 2022-11-14 NOTE — PROGRESS NOTES
Patient was up to chair for meals. He is not consistent on using call light, but did use it occasionally for assistance. He is forgetful, but easily reoriented.   Mobility improved to SBA with walker.   Ortega removed today and patient has voided twice since, denying any issues. Post void scan needing to be done next time he voids.  Per MD, plan to discharge tomorrow to a rehab facility at the [facility]

## 2022-11-14 NOTE — PROGRESS NOTES
Care Management Follow Up    Length of Stay (days): 7    Expected Discharge Date: 11/15/2022     Concerns to be Addressed:       Patient plan of care discussed at interdisciplinary rounds: Yes    Anticipated Discharge Disposition: Acute Rehab     Anticipated Discharge Services: Transportation Services    Patient/family educated on Medicare website which has current facility and service quality ratings: yes  Education Provided on the Discharge Plan:  yes  Patient/Family in Agreement with the Plan: yes    Referrals Placed by CM/SW: External Care Coordination, Post Acute Facilities, Transportation  Private pay costs discussed: Not applicable    Additional Information:    Spoke to [facility] [phone].  Patient is not medically stable for discharge today.  [facility] will review 11/15 AM for admission if medically stable.  Transportation will be provided by [facility] upon discharge.    Discussed the discharge plan with wife Luba, she is in agreement.      Plan:  [facility]      Erica Armijo RN

## 2022-11-14 NOTE — PROGRESS NOTES
[facility] Medicine Progress Note  Date of Service: 11/14/2022    Assessment & Plan     A 78 yo male with a h/o HTN and aortic stenosis who p/w confusion with speech disturbance, and family concern that he may have left sided weakness and a left facial droop, found to have acute multifocal ischemic CVA and a-fib. Hospital course complicated by a-fib with RVR, sepsis from influenza with suspected bacterial superinfection, and acute hypoxic respiratory failure 2/2 influenza with suspected bacterial pneumonia, pulmonary edema (likely 2/2 a-fib with RVR) and suspected undiagnosed COPD with exacerbation.      Acute, multifocal ischemic CVA 2/2 embolism related to a-fib: CT head showed b/l cerebral white matter hypodensities which may represent the sequela of chronic microvascular ischemia, but they remain age-indeterminate in the absence of prior exams for comparison and may mask a small acute infarct. CTA head neg for large vessel occlusion, high-grade stenosis, or aneurysm. CTA neck showed no e/o carotid or vertebral artery hemodynamically significant stenosis, dissection, or pseudoaneurysm. MRI brain showed numerous punctate scattered supratentorial and infratentorial acute infarcts, specifically involving the right hippocampus; this distribution is suspicious for a thromboembolic etiology; no significant mass effect or hemorrhagic conversion. CT C/A/P was obtained given 3 T sign was suspicious for  possible underlying malignancy and showed non-specific CHANDLER as below. EKG showed rate controlled a-fib. Echo showed preserved EF, mild LVH, no LV thrombus, neg bubble study, moderate to severe MR, moderate MS, severe aortic stenosis, and mild pulmonary HTN with PAP 35-45mmHg. Total cholesterol 135, TG 66 HDL 53, LDL 69, A1c 5.0%.     Stroke neurology consult appreciated. Recommended limited permissive HTN with goal SBP <180 x 2 days, continue lipitor at 10mg daily, eliquis, and no  need for ASA if starting AC. PT, OT, SLP consulted.   -Continue eliquis, lipitor  -Neuro checks  -Therapies recommend ARU  -Will need f/u with neurology in 6-8 weeks ([phone])      A-fib (new diagnosis) with RVR: Patient developed RVR AM of 11/12. Patient was given cardizem 20mg IV X 1, then started on gtt at 5mg/hr. Patient was then started on lopressor and this was titrated up 11/12, enabling discontinuation of cardizem gtt.       Rate controlled on metoprolol tartrate 50 mg BID. Was on metoprolol succinate 100 mg daily prior to admission.  -Telemetry  -Cont lopressor 50mg BID today and convert back to metoprolol succinate 100 mg daily tomorrow  -Cont eliquis   -Keep k >4 and mg >2   -Spoke with cardiology 11/8 after echo resulted and showed MS given possible need for warfarin instead of NOAC. Per cardiology, given that the mitral valve is calcified, NOAC is fine.  (If this was non-calcified / rheumatic MS, then patient would need to be on warfarin.)     Sepsis 2/2 influenza A with suspected superimposed bacterial pneumonia: Patient with reported fever of 103 (not documented) and tachycardic on 11/10. CXR with increased interstitial and hazy opacities in both lungs (L>R); non-specific, but could be infectious or inflammatory in etiology; pulmonary edema is another possibility. BNP elevated 11/10, but patient was not clinically volume OL at that time. CT chest obtained to further clarify CXR and this showed mild LLL pneumonia and persistent mediastinal CHANDLER as below, but no e/o volume OL. Strep pnemo and legionella urinary ag neg. 1/2 BCx bottles from 11/10 isolated staph epi which is a contaminate and vancomycin was discontinued. PCT 0.55. Now afebrile and no leukocytosis.     Has received 5 days of ceftriaxone and azithromycin.   - Stop ceftriaxone, azithromycin  - Complete 5 days Tamiflu - 1 more day  - Cont mucinex, prn tessalon  -Sputum cx ordered, but patient unable to expectorate  -Trend WBC and fever  curve     Acute hypoxic respiratory failure: Multifactorial and related to influenza with suspected bacterial superinfection, suspected undiagnosed COPD with acute exacerbation, and pulmonary edema. Suspect pulmonary edema developed as a result of a-fib with RVR. Stat CXR 11/12 for respiratory distress showed persistent LLL pneumonia and increased interstitial markings consistent with congestion and edema. ABG showed pH 7.48, pC02 34, p02 72. Patient had been up to 10L via oxy mask, now weaned off of supplemental oxygen with lasix diuresis.  -Change solumedrol 40mg IV BID to prednisone 40 mg daily  -Cont duonebs, prn albuterol  -Discontinue lasix 40mg IV BID and started lasix 20mg daily today  -Limited echo today shows normal LVEF  -02 prn to maintain sats >88%    Severe aortic stenosis  Mod-severe mitral stenosis  Moderate mitral insufficiency    Seen on echo this admission. Unchanged on follow up echo today.    - outpatient follow up with cardiology    Elevated troponin, likely 2/2 demand ischemia / type II NSTEMI: EKG w/o ischemic changes. Trop peaked at 374, then trended down. Denied CP or SOB earlier in hospital course, but patient was not a reliable historian at that time given suspected expressive aphasia.   -Spoke with cardiology at the [facility] 11/8 given complexity of the situation due to patient's inability to give reliable history. Cardiology agreed that there were no ischemic changes on EKG. Advised that this is likely demand ischemia, but recommended treating as an NSTEMI with starting a heparin gtt if there were WMA on echo or LV thrombus. If neither or these were noted, no need for heparin gtt and recommended starting eliquis as above. Given echo w/o WMA or LV thrombus, no indication for heparin gtt.     Acute metabolic encephalopathy: Ammonia, TSH and B12 all okay. UA neg for infection. CT/MRI brain as above. Patient transiently confused 11/12 while in respiratory distress and likely 2/2 hypoxia, now  back to baseline with improved respiratory status.    Improved. May be baseline today.   -Monitor mental status     Mediastinal lymphadenopathy: CT C/A/P notable for several prominent to mildly enlarged nonspecific mediastinal lymph nodes.  -Will need repeat CT to re-evaluate as an o/p and if the CHANDLER persists / worsens, will need to consider biopsy     Hypertension   -On lopressor 50mg BID as above  -Holding PTA hydrochlorothiazide, toprol, lisinopril, amlodipine for now given BP normotensive to borderline low on lopressor alone     Chronic anemia: H/H is somewhat below prior baseline, but no labs available since 2021 and Hb at that time was 12. B12 and folate both WNL. Iron studies are consistent with GILDA. No e/o active bleeding. H/H stable.  -Stool guaiac ordered, but never collected  -Hold off on venofer given that this can occasionally cause significant HTN and patient has had an acute CVA as above  -Cont ferrous sulfate  -Trend H/H      Microscopic hematuria: Likely 2/2 traumatic charles catheter placement.  -Monitor     Urinary retention: UA neg for infection.    Charles catheter placed. Cath pulled for voiding trial today and appears to be urinating.    - check PVR discussed with RN  -Cont flomax    Diet: Combination Diet 2 gm NA Diet; Slightly Thick (level 1)    DVT Prophylaxis: DOAC  Charles Catheter: Not present  Central Lines: None  Code Status: Full Code      Discussion: Clinically appears improving.    Disposition: Anticipate discharge to ARU tomorrow if bed available    Attestation:  Total time: 40 minutes    Clifton Rice MD       Interval History   Denies chest pain or shortness of breath.  Tolerating diet.    Physical Exam   Temp:  [96.1  F (35.6  C)-98  F (36.7  C)] 96.5  F (35.8  C)  Pulse:  [] 83  Resp:  [18-20] 20  BP: (117-150)/(75-98) 119/96  SpO2:  [94 %-98 %] 97 %    Weights:   Vitals:    11/12/22 0529 11/13/22 0600 11/14/22 0400   Weight: 82.8 kg (182 lb 8.7 oz) 80.4 kg (177 lb 4 oz) 78.6 kg  (173 lb 4.5 oz)    Body mass index is 25.59 kg/m .    Constitutional: Alert and oriented to place, day and situation.  Nontoxic in no acute distress  CV: irregular, no edema, no jugular venous distention  Respiratory: CTA bilaterally  GI: Soft, non-tender, bowel sounds normal  Skin: Warm and dry  Neurologic: Extraocular wounds are intact, no facial droop, speech is a little hesitant at times but otherwise largely appropriate.  There is mild right pronator drift.  There is mild dysmetria on finger-to-nose on the left.   bicep and tricep strength normal    Data   Recent Labs   Lab 11/14/22  0539 11/13/22  0514 11/12/22  2129 11/12/22  1230 11/12/22  1148 11/12/22  0505 11/08/22  0302 11/07/22  1751   WBC 13.2* 8.6  --   --   --  10.5   < > 17.4*   HGB 11.7* 10.6*  --   --   --  10.7*   < > 10.0*   MCV 92 92  --   --   --  94   < > 94    256  --   --   --  248   < > 165   INR  --   --   --   --   --   --   --  1.33*    138  --  133*  --   --    < > 135*   POTASSIUM 4.1 4.0  --  3.9  --   --    < > 3.9   CHLORIDE 99 101  --  99  --   --    < > 102   CO2 29 28  --  25  --   --    < > 22   BUN 34.4* 28.9*  --  24.1*  --   --    < > 21.4   CR 0.85 0.80  --  0.85  --   --    < > 1.02   ANIONGAP 8 9  --  9  --   --    < > 11   GENESIS 8.4* 8.5*  --  8.3*  --   --    < > 7.9*   * 151* 152* 203*   < >  --    < > 128*   ALBUMIN  --   --   --   --   --   --   --  3.2*   PROTTOTAL  --   --   --   --   --   --   --  5.8*   BILITOTAL  --   --   --   --   --   --   --  1.1   ALKPHOS  --   --   --   --   --   --   --  72   ALT  --   --   --   --   --   --   --  16   AST  --   --   --   --   --   --   --  27    < > = values in this interval not displayed.       Recent Labs   Lab 11/14/22  0539 11/13/22  0514 11/12/22 2129 11/12/22  1230 11/12/22  1148 11/11/22  1721   * 151* 152* 203* 217* 143*        Unresulted Labs Ordered in the Past 30 Days of this Admission     Date and Time Order Name Status  Description    11/10/2022  1:36 AM Blood Culture Arm, Right Preliminary            Imaging  Recent Results (from the past 24 hour(s))   Echocardiogram Limited   Result Value    LVEF  65-70%    Narrative    [id number]  [id number]  [id number]  [id number]^GAGE^HARRY^ARGENIS     [facility]  Echocardiography Laboratory  [address]  [place] [id number]     Name: RADHIKA OSULLIVAN  MRN: [id number]  : 1945  Study Date: 2022 09:52 AM  Age: 77 yrs  Gender: Male  Patient Location: [facility]  Reason For Study: Recheck EF, ? WMA, worsening respiratory failure  Ordering Physician: HARRY ALMONTE  Referring Physician: Saleem Jones  Performed By: Michelle Lopez RDCS     BSA: 1.9 m2  Height: 69 in  Weight: 173 lb  HR: 75  BP: 150/98 mmHg  ______________________________________________________________________________  Procedure  Limited Portable Echo Adult. Optison (NDC #[id number]) given intravenously.  Full echo done 1 week ago on 22.  ______________________________________________________________________________  Interpretation Summary     Left ventricular systolic function is normal.  The visual ejection fraction is 65-70%.  No regional wall motion abnormalities noted.  Severe valvular aortic stenosis.  There is moderate to severe mitral stenosis.  Calcified mitral apparatus causing mitral stenosis.  The mean mitral valve gradient is 9mmHg.(91bpm)  There is moderate (2+) mitral regurgitation.  No significant change compared to priro study from 22. The study was  technically limited.  ______________________________________________________________________________  Left Ventricle  The left ventricle is normal in size. Left ventricular systolic function is  normal. The visual ejection fraction is 65-70%. No regional wall motion  abnormalities noted.     Right Ventricle  The right ventricle is normal size. The right ventricular systolic function is  normal.     Mitral Valve  There is severe mitral  annular calcification. There is moderate (2+) mitral  regurgitation. (91bpm). The mean mitral valve gradient is 9mmHg. There is  moderate to severe mitral stenosis. Calcified mitral apparatus causing mitral  stenosis.     Tricuspid Valve  There is mild (1+) tricuspid regurgitation. The right ventricular systolic  pressure is approximated at 35.2 mmHg plus the right atrial pressure. IVC  diameter >2.1 cm collapsing <50% with sniff suggests a high RA pressure  estimated at 15 mmHg or greater.     Aortic Valve  The aortic valve is not well visualized. It is severely calcified. Severe  valvular aortic stenosis. The peak AoV pressure gradient is 69.3 mmHg.     Pericardium  There is no pericardial effusion.  ______________________________________________________________________________  Doppler Measurements & Calculations  MV max P.1 mmHg  MV mean P.0 mmHg  MV V2 VTI: 44.9 cm  Ao V2 max: 415.9 cm/sec  Ao max P.3 mmHg  TR max rubén: 295.8 cm/sec  TR max P.2 mmHg     ______________________________________________________________________________  Report approved by: Zita Sheikh 2022 12:03 PM              I reviewed all new labs and imaging results over the last 24 hours. I personally reviewed no images or EKG's today.    Medications     - MEDICATION INSTRUCTIONS -       - MEDICATION INSTRUCTIONS -         [Held by provider] amLODIPine  10 mg Oral Daily     apixaban ANTICOAGULANT  5 mg Oral BID     atorvastatin  10 mg Oral Daily     ferrous sulfate  325 mg Oral BID     furosemide  20 mg Oral Daily     guaiFENesin  600 mg Oral BID     [Held by provider] hydrochlorothiazide  12.5 mg Oral Daily     lisinopril  10 mg Oral QPM     metoprolol succinate ER  100 mg Oral Daily     metoprolol tartrate  50 mg Oral BID     oseltamivir  75 mg Oral BID     [START ON 11/15/2022] predniSONE  40 mg Oral Daily     sodium chloride (PF)  3 mL Intracatheter Q8H     tamsulosin  0.4 mg Oral Daily   Reviewed    Clifton  MD Krystal

## 2022-11-14 NOTE — PROGRESS NOTES
Patient up to chair for breakfast. He transferred well with minimal SBA. He is alert/ oriented with some delay in responses, although he is hard of hearing.   Currently having ECHO performed.   Will remove charles when echo is done. Plan to perform voiding trial with removal of charles.

## 2022-11-15 NOTE — PROGRESS NOTES
Clinical Nutrition Services- Brief Note    Reviewed nutrition risk factors due to LOS. Pt is tolerating a Regular diet, eating well per nursing documentation and patient report. No nutrition issues identified at this time. RD will continue to follow per nutrition protocol.    Briana Anderson RDN, GRACIA  Clinical Dietitian  Office: [phone]  Weekend pager: [phone]

## 2022-11-15 NOTE — PROGRESS NOTES
[facility] Medicine Progress Note  Date of Service: 11/15/2022    Assessment & Plan     A 76 yo male with a h/o HTN and aortic stenosis who p/w confusion with speech disturbance, and family concern that he may have left sided weakness and a left facial droop, found to have acute multifocal ischemic CVA and a-fib. Hospital course complicated by a-fib with RVR, sepsis from influenza with suspected bacterial superinfection, and acute hypoxic respiratory failure 2/2 influenza with suspected bacterial pneumonia, pulmonary edema (likely 2/2 a-fib with RVR) and suspected undiagnosed COPD with exacerbation.      Acute, multifocal ischemic CVA 2/2 embolism related to a-fib    CT head showed b/l cerebral white matter hypodensities which may represent the sequela of chronic microvascular ischemia, but they remain age-indeterminate in the absence of prior exams for comparison and may mask a small acute infarct. CTA head neg for large vessel occlusion, high-grade stenosis, or aneurysm. CTA neck showed no e/o carotid or vertebral artery hemodynamically significant stenosis, dissection, or pseudoaneurysm. MRI brain showed numerous punctate scattered supratentorial and infratentorial acute infarcts, specifically involving the right hippocampus; this distribution is suspicious for a thromboembolic etiology; no significant mass effect or hemorrhagic conversion. CT C/A/P was obtained given 3 T sign was suspicious for  possible underlying malignancy and showed non-specific CHANDLER as below. EKG showed rate controlled a-fib. Echo showed preserved EF, mild LVH, no LV thrombus, neg bubble study, moderate to severe MR, moderate MS, severe aortic stenosis, and mild pulmonary HTN with PAP 35-45mmHg. Total cholesterol 135, TG 66 HDL 53, LDL 69, A1c 5.0%.     Stroke neurology consult appreciated. Recommended limited permissive HTN with goal SBP <180 x 2 days, continue lipitor at 10mg daily, eliquis, and no  need for ASA if starting AC. PT, OT, SLP consulted.   -Continue eliquis, lipitor  -Will need f/u with neurology in 6-8 weeks ([phone])      A-fib (new diagnosis) with RVR    Patient developed RVR AM of 11/12. Patient was given cardizem 20mg IV X 1, then started on gtt at 5mg/hr. Patient was then started on lopressor and this was titrated up 11/12, enabling discontinuation of cardizem gtt.       Rate controlled on metoprolol tartrate 50 mg BID. Was on metoprolol succinate 100 mg daily prior to admission.   -Converted back to metoprolol succinate 100 mg daily today   -Cont eliquis   -Keep k >4 and mg >2   -Spoke with cardiology 11/8 after echo resulted and showed MS given possible need for warfarin instead of NOAC. Per cardiology, given that the mitral valve is calcified, NOAC is fine.  (If this was non-calcified / rheumatic MS, then patient would need to be on warfarin.)     Sepsis 2/2 influenza A with suspected superimposed bacterial pneumonia    Patient with reported fever of 103 (not documented) and tachycardic on 11/10. CXR with increased interstitial and hazy opacities in both lungs (L>R); non-specific, but could be infectious or inflammatory in etiology; pulmonary edema is another possibility. BNP elevated 11/10, but patient was not clinically volume OL at that time. CT chest obtained to further clarify CXR and this showed mild LLL pneumonia and persistent mediastinal CHANDLER as below, but no e/o volume OL. Strep pnemo and legionella urinary ag neg. 1/2 BCx bottles from 11/10 isolated staph epi which is a contaminate and vancomycin was discontinued. PCT 0.55. Now afebrile and no leukocytosis.     Completed 5 days of ceftriaxone and azithromycin.     Completed 5 days Tamiflu  - Cont mucinex, prn tessalon  - Sputum cx ordered, but patient unable to expectorate  - Tend WBC and fever curve     Acute hypoxic respiratory failure    Multifactorial and related to influenza with suspected bacterial superinfection,  suspected undiagnosed COPD with acute exacerbation, and pulmonary edema. Suspect pulmonary edema developed as a result of a-fib with RVR. Stat CXR 11/12 for respiratory distress showed persistent LLL pneumonia and increased interstitial markings consistent with congestion and edema. ABG showed pH 7.48, pC02 34, p02 72. Patient had been up to 10L via oxy mask, now weaned off of supplemental oxygen with lasix diuresis.  -Changed solumedrol 40mg IV BID to prednisone 40 mg daily today, last day tomorrow  -Cont duonebs, prn albuterol  - continue lasix 20mg daily   -Limited echo showed normal LVEF    Severe aortic stenosis  Mod-severe mitral stenosis  Moderate mitral insufficiency    Seen on echo this admission. Unchanged on follow up echo today.    - outpatient follow up with cardiology    Elevated troponin, likely 2/2 demand ischemia / type II NSTEMI    EKG w/o ischemic changes. Trop peaked at 374, then trended down. Denied CP or SOB earlier in hospital course, but patient was not a reliable historian at that time given suspected expressive aphasia.   -Spoke with cardiology at the [facility] 11/8 given complexity of the situation due to patient's inability to give reliable history. Cardiology agreed that there were no ischemic changes on EKG. Advised that this is likely demand ischemia, but recommended treating as an NSTEMI with starting a heparin gtt if there were WMA on echo or LV thrombus. If neither or these were noted, no need for heparin gtt and recommended starting eliquis as above. Given echo w/o WMA or LV thrombus, no indication for heparin gtt.     Acute metabolic encephalopathy     Ammonia, TSH and B12 all okay. UA neg for infection. CT/MRI brain as above. Patient transiently confused 11/12 while in respiratory distress and likely 2/2 hypoxia, now back to baseline with improved respiratory status.    Improved. May be baseline 11/14.       Mediastinal lymphadenopathy    CT C/A/P notable for several prominent to  mildly enlarged nonspecific mediastinal lymph nodes.  -Will need repeat CT to re-evaluate as an o/p and if the CHANDLER persists / worsens, will need to consider biopsy     Hypertension   -On lopressor 50mg BID as above  -Holding PTA hydrochlorothiazide, toprol, lisinopril, amlodipine for now given BP normotensive to borderline low on lopressor alone     Chronic anemia    H/H is somewhat below prior baseline, but no labs available since 2021 and Hb at that time was 12. B12 and folate both WNL. Iron studies are consistent with GILDA. No e/o active bleeding. H/H stable.  -Stool guaiac ordered, but never collected  -Hold off on venofer given that this can occasionally cause significant HTN and patient has had an acute CVA as above  -Cont ferrous sulfate  -Trend H/H      Microscopic hematuria    Likely 2/2 traumatic charles catheter placement.  -Monitor     Urinary retention    UA neg for infection.    Charles catheter placed. Cath pulled for voiding trial 11/14 and appears to be urinating.   -Cont flomax    Diet: Combination Diet 2 gm NA Diet; Thin Liquids (level 0)    DVT Prophylaxis: DOAC  Charles Catheter: Not present  Central Lines: None  Code Status: Full Code          Discussion: Improving and no longer has needs requiring ARU. Looking for TCU. Stable for discharge when rehab bed found.     Disposition Plan      Expected Discharge Date: 11/16/2022, 12:30 PM    Destination: inpatient rehabilitation facility          The patient's care was discussed with the Patient and SW/Care mgt    Attestation:  Total time: 25 minutes    Clifton Rice MD  [facility]        Interval History   No new complaints. No chest pain, shortness of breath. Good appetite.     Physical Exam   Temp:  [97.6  F (36.4  C)-98.2  F (36.8  C)] 97.6  F (36.4  C)  Pulse:  [49-86] 78  Resp:  [16-18] 18  BP: (117-134)/(55-85) 130/77  SpO2:  [92 %-99 %] 97 %    Weights:   Vitals:    11/13/22 0600 11/14/22 0400 11/15/22 0535   Weight: 80.4 kg (177 lb 4 oz) 78.6  kg (173 lb 4.5 oz) 80.5 kg (177 lb 7.5 oz)    Body mass index is 26.21 kg/m .    Constitutional: alert and oriented x 3, nontoxic  CV: Irregular, harsh systolic murmur over precordium, no edema  Respiratory: CTA bilaterally  GI: Soft, non-tender, bowel sounds normal  Skin: Warm and dry    Data   Recent Labs   Lab 11/15/22  1120 11/15/22  0835 11/15/22  0532 11/14/22 0539 11/13/22 0514   WBC  --   --  10.5 13.2* 8.6   HGB  --   --  10.1* 11.7* 10.6*   MCV  --   --  92 92 92   PLT  --   --  321 367 256   NA  --   --  137 136 138   POTASSIUM  --   --  3.7 4.1 4.0   CHLORIDE  --   --  102 99 101   CO2  --   --  31* 29 28   BUN  --   --  29.7* 34.4* 28.9*   CR  --   --  0.81 0.85 0.80   ANIONGAP  --   --  4* 8 9   GENESIS  --   --  8.4* 8.4* 8.5*   * 85 104* 178* 151*       Recent Labs   Lab 11/15/22  1120 11/15/22  0835 11/15/22  0532 11/14/22 0539 11/13/22 0514 11/12/22 2129   * 85 104* 178* 151* 152*        Unresulted Labs Ordered in the Past 30 Days of this Admission     No orders found from 10/8/2022 to 11/8/2022.           Imaging: No results found for this or any previous visit (from the past 24 hour(s)).     I reviewed all new labs and imaging results over the last 24 hours. I personally reviewed no images or EKG's today.    Medications     - MEDICATION INSTRUCTIONS -       - MEDICATION INSTRUCTIONS -         [Held by provider] amLODIPine  10 mg Oral Daily     apixaban ANTICOAGULANT  5 mg Oral BID     atorvastatin  10 mg Oral Daily     ferrous sulfate  325 mg Oral BID     furosemide  20 mg Oral Daily     guaiFENesin  600 mg Oral BID     [Held by provider] hydrochlorothiazide  12.5 mg Oral Daily     lisinopril  10 mg Oral QPM     metoprolol succinate ER  100 mg Oral Daily     predniSONE  40 mg Oral Daily     sodium chloride (PF)  3 mL Intracatheter Q8H     tamsulosin  0.4 mg Oral Daily   Reviewed meds    Clifton Rice MD  [facility]

## 2022-11-15 NOTE — PROGRESS NOTES
Patient has rested throughout he noc without issue. Denies pain. Up to BR to void. Neuro intact. Needs reminding to use the call light when getting up to use the BR.  Temp: 97.7  F (36.5  C) Temp src: Oral BP: 120/71 Pulse: 75   Resp: 18 SpO2: 99 %

## 2022-11-15 NOTE — PLAN OF CARE
Goal Outcome Evaluation:pt is alert and able to sate where he is and why. He is roderick to stand and walk with SBA. He is forgetful in using the call light for assist out of bed. He has not used the call light, only set off the chair and bed alarms. He had one episode of urgency and incontinence trying to get to the BR. The next void, he was able to stand and void in the toilet, a post-void bladder scan done for less than 10 mls. Pt denies pain/discomforts.

## 2022-11-15 NOTE — PLAN OF CARE
Assumed care [id number]  Neuro: A&Ox4. Forgetful.  Cardiac: Tele discontinued this shift. VSS.   Respiratory: Sating adequately on RA.  GI/: Adequate urine output. BM X1  Diet/appetite: Tolerating regular diet.   Activity:  Assist of SBA, up to chair and in halls.  Pain: At acceptable level on current regimen.   Skin: No new deficits noted.  LDA's: PIV    Plan: Continue with POC. Notify primary team with changes. Plan for possible discharge to [facility] tomorrow at 1230.

## 2022-11-15 NOTE — PLAN OF CARE
Problem: Pneumonia  Goal: Fluid Balance  Outcome: Progressing  Intake of fluids is increasing. Large voids noted.       Problem: Pneumonia  Goal: Effective Oxygenation and Ventilation  Outcome: Progressing  Room air. Saturations in the upper 90's. Continues to have a NPC.

## 2022-11-15 NOTE — PROGRESS NOTES
Care Management Follow Up    Length of Stay (days): 8    Expected Discharge Date: 11/16/22     Concerns to be Addressed: all concerns addressed in this encounter     Patient plan of care discussed at interdisciplinary rounds: Yes    Anticipated Discharge Disposition: Transitional Care     Anticipated Discharge Services: None  Anticipated Discharge DME: Wheelchair    Patient/family educated on Medicare website which has current facility and service quality ratings: yes  Education Provided on the Discharge Plan:  Patient, wife MD Luba, Yanelis at [facility]  Patient/Family in Agreement with the Plan: yes    Referrals Placed by CM/SW: External Care Coordination, Post Acute Facilities  Private pay costs discussed: Not applicable    Additional Information:    [facility] declined the Pt, he no longer meets ARU criteria.      The Pt has been accepted at [facility] Phone (Main Phone:[phone] Admissions Phone:[phone] Fax: [phone]) 11/16.  PAS #[id number].  Transportation will be provided by sister-in-law, Yohana.    Discussed the discharge plan with the Pt, wife Luba and MD, they are in agreement.    Referral placed for [facility] Care Coordination.    Plan:  [facility].  Transportation provided by family.      Erica Armijo RN

## 2022-11-16 NOTE — PROGRESS NOTES
Patient rested without issue. Up to BR to void with SBA. Steady on his feet.   Temp: 97.4  F (36.3  C) Temp src: Oral BP: 102/50 Pulse: 68   Resp: 15 SpO2: 98 %

## 2022-11-16 NOTE — DISCHARGE SUMMARY
[facility]  Discharge Summary  Hospital Medicine       Date of Admission:  11/7/2022  Date of Discharge:  11/16/2022  2:22 PM  Discharging Provider: Clifton Rice MD      Identification and Chief Compaint: Leon Alvarado is a 77 year old male who presented on 11/7/2022 with complaint of altered mental status and left-sided weakness with facial droop.    Discharge Diagnoses   Acute, multifocal ischemic CVA 2/2 embolism related to a-fib  A-fib (new diagnosis) with RVR  Sepsis 2/2 influenza A with suspected superimposed bacterial pneumonia   Acute hypoxic respiratory failure  Severe aortic stenosis  Mod-severe mitral stenosis  Moderate mitral insufficiency  Elevated troponin, likely 2/2 demand ischemia / type II NSTEMI  Acute metabolic encephalopathy   Mediastinal lymphadenopathy  Hypertension   Anemia of chronic disease  Urinary retention     Follow-ups Needed After Discharge   Follow-up Appointments     Follow Up and recommended labs and tests      Follow up with Nursing home physician. Follow up CT chest in 3 months to   eval mediastinal lymphadenopathy and if not resolved, may need biopsy.           Hospital Course     A 76 yo male with a h/o HTN and aortic stenosis who p/w on 11/7/2022 with complaint of confusion with speech disturbance, and family concern that he may have left sided weakness and a left facial droop, found to have acute multifocal ischemic CVA and a-fib. Hospital course complicated by a-fib with RVR, sepsis from influenza with suspected bacterial superinfection, and acute hypoxic respiratory failure 2/2 influenza with suspected bacterial pneumonia, pulmonary edema (likely 2/2 a-fib with RVR) and suspected undiagnosed COPD with exacerbation.      Acute, multifocal ischemic CVA 2/2 embolism related to a-fib    CT head negative for acute infarct. CTA head neg for large vessel occlusion, high-grade stenosis, or aneurysm. CTA neck showed no e/o carotid or vertebral  artery hemodynamically significant stenosis, dissection, or pseudoaneurysm. MRI brain showed numerous punctate scattered supratentorial and infratentorial acute infarcts, specifically involving the right hippocampus; this distribution is suspicious for a thromboembolic etiology; no significant mass effect or hemorrhagic conversion. CT C/A/P was obtained given 3 T sign was suspicious for  possible underlying malignancy and showed non-specific CHANDLER as below. EKG showed rate controlled a-fib. Echo showed preserved EF, mild LVH, no LV thrombus, neg bubble study, moderate to severe MR, moderate MS, severe aortic stenosis, and mild pulmonary HTN with PAP 35-45mmHg. Total cholesterol 135, TG 66 HDL 53, LDL 69, A1c 5.0%.     Stroke neurology consult appreciated. Recommended limited permissive HTN with goal SBP <180 x 2 days, start atorvastatin at 10mg daily, start apixaban, and no need for ASA if starting AC. PT, OT, SLP consulted.     Continue apixaban (new), atorvastatin (new)    F/u with stroke neurology in 6-8 weeks     A-fib (new diagnosis) with RVR    Patient developed RVR AM of 11/12. Patient was given cardizem 20mg IV X 1, then started on gtt at 5mg/hr. Patient was then started on lopressor and this was titrated up 11/12, enabling discontinuation of cardizem gtt. Rate controlled on metoprolol tartrate 50 mg BID. Was on metoprolol succinate 100 mg daily prior to admission. Metoprolol was converted back to metoprolol succinate 100 mg daily. Rate remains controlled 68 on day of discharge. Anticoagulation initiated with apixaban.      Sepsis 2/2 influenza A with suspected superimposed bacterial pneumonia    Patient with fever of 103.1 and tachycardic on 11/10. CXR with increased interstitial and hazy opacities in both lungs (L>R); non-specific, but could be infectious or inflammatory in etiology; pulmonary edema is another possibility. BNP elevated 11/10, but patient was not clinically volume OL at that time. CT chest  obtained to further clarify CXR and this showed mild LLL pneumonia and persistent mediastinal CHANDLER as below, but no e/o volume OL. Respiratory panel PCR positive for Influenza A. (Was not tested for influenza on admission.)  PCR Strep pnemo and legionella urinary ag neg. 1/2 BCx bottles from 11/10 isolated staph epi which is a contaminate and vancomycin was discontinued. PCT 0.55. Fever and leukocytosis resolved.     Completed 5 days of ceftriaxone and azithromycin.     Completed 5 days oseltamivir.     Acute hypoxic respiratory failure    Multifactorial and related to influenza with suspected bacterial superinfection, suspected undiagnosed COPD with acute exacerbation, and pulmonary edema. Suspect pulmonary edema developed as a result of a-fib with RVR. Stat CXR 11/12 for respiratory distress showed persistent LLL pneumonia and increased interstitial markings consistent with congestion and edema. ABG showed pH 7.48, pC02 34, p02 72. Patient had been up to 10L via oxy mask, now weaned off of supplemental oxygen with lasix diuresis. Also treated with steroid burst with last dose today, on day of discharge.     Discharge on furosemide 20 mg oral daily (new).     Severe aortic stenosis  Mod-severe mitral stenosis  Moderate mitral insufficiency    Seen on echo this admission.     - outpatient follow up with cardiology    Elevated troponin, likely 2/2 demand ischemia / type II NSTEMI    EKG w/o ischemic changes. Trop peaked at 374, then trended down. Denied CP or SOB earlier in hospital course, but patient was not a reliable historian at that time given suspected expressive aphasia.     Spoke with cardiology at the [facility] 11/8 given complexity of the situation due to patient's inability to give reliable history. Cardiology agreed that there were no ischemic changes on EKG. Advised that this is likely demand ischemia, but recommended treating as an NSTEMI with starting a heparin gtt if there were WMA on echo or LV  thrombus. If neither or these were noted, no need for heparin gtt and recommended starting apixaban as above. Given echo w/o WMA or LV thrombus, no indication for heparin gtt.     Acute metabolic encephalopathy     Ammonia, TSH and B12 all okay. UA neg for infection. CT/MRI brain as above. Patient transiently confused 11/12 while in respiratory distress and likely 2/2 hypoxia, now back to baseline with improved respiratory status.    Improved. May be baseline 11/14.       Mediastinal lymphadenopathy    CT C/A/P notable for several prominent to mildly enlarged nonspecific mediastinal lymph nodes.    Will need repeat CT to re-evaluate as an o/p and if the CHANDLER persists / worsens, will need to consider biopsy. Consider follow up CT in 3 months.      Hypertension     On metoprolol  mg daily as above. Patient's BP has been running normotensive to borderline low on the metoprolol though gradually normalizing. PTA hydrochlorothiazide, lisinopril, and amlodipine were initially held, and lisinopril has been resumed at 10 mg daily thus far. -121/50-76 this morning though also had BP as high as 142/90 yesterday. Will increase lisinopril to 20 mg on discharge which is still reduced from 40 mg PTA. Furosemide was started this admission (as above). Will not restart hydrochlorothiazide or amlodipine at this time. Follow BP as outpatient to see if it continues to normalize to previous anti-hypertensive needs.      Anemia of chronic disease    H/H is somewhat below prior baseline, but no labs available since 2021 and Hb at that time was 12. B12 and folate both WNL. Iron studies showed normal ferritin 278, low iron 14, low TIBC 204, and low iron sat 7%. MCV is normal 92 and RDW is normal. These studies most consistent with anemia of chronic disease. No e/o active bleeding. Hemoglobin variable based on fluid status but averaging 10-11 the last week prior to discharge.      Urinary retention    UA neg for infection.    Ortega  catheter placed. Tamsulosin started. Cath pulled for voiding trial 11/14 and appears to be urinating. Continue tamsulosin on discharge (new).      Microscopic hematuria    Likely 2/2 traumatic charles catheter placement.     Consultations This Hospital Stay   NEUROLOGY IP STROKE CONSULT     Ancillary Consultations This Hospital Stay   SPEECH LANGUAGE PATH ADULT IP CONSULT  PHYSICAL THERAPY ADULT IP CONSULT  OCCUPATIONAL THERAPY ADULT IP CONSULT  CARE MANAGEMENT / SOCIAL WORK IP CONSULT    Code Status   Full Code    The discharge plan was discussed with the patient, and he expressed understanding.     Time Spent on this Encounter   Total time on this discharge was > 30 minutes.       Clifton Rice MD  [facility]  ______________________________________________________________________    Physical Exam   Vital Signs: Temp: 97.5  F (36.4  C) Temp src: Oral BP: 121/76 Pulse: 68   Resp: 16 SpO2: 95 %      Weight: 177 lbs 7.52 oz  Constitutional: alert, oriented to place, day and situation, NAD  CV: Irregular, harsh systolic murmur, no edema  Respiratory: CTA bilaterally  GI: Soft, non-tender   Skin: Warm and dry       Primary Care Physician   Saleem Quintero [facility] [address] [facility] [address]     Discharge Disposition   Discharged to short-term care facility  Condition at discharge: Good    Significant Results and Procedures   Results for orders placed or performed during the hospital encounter of 11/07/22   CTA Head Neck with Contrast    Addendum: 11/7/2022    Findings of unenhanced CT were discussed over the phone with Dr. Roldan on 11/07/2022 at 1801 CST.          Narrative    EXAM: CTA HEAD NECK W CONTRAST, CT HEAD W/O CONTRAST  LOCATION: [facility]  DATE/TIME: 11/7/2022 5:42 PM    INDICATION: Left-sided weakness and left facial droop.  COMPARISON: None.  CONTRAST: 75 ml Isovue 370  TECHNIQUE: Head and neck CT angiogram with IV  contrast. Noncontrast head CT followed by axial helical CT images of the head and neck vessels obtained during the arterial phase of intravenous contrast administration. Axial 2D reconstructed images and   multiplanar 3D MIP reconstructed images of the head and neck vessels were performed by the technologist. Dose reduction techniques were used. All stenosis measurements made according to NASCET criteria unless otherwise specified.    FINDINGS:   NONCONTRAST HEAD CT:   INTRACRANIAL CONTENTS: No acute intracranial hemorrhage, extraaxial collection, or mass effect.  Scattered hypodensities within the bilateral periventricular, deep, and subcortical cerebral white matter, nonspecific although may be related to the chronic   microvascular ischemia. These limits the assessment for a small acute infarct. No evidence of an acute large vascular distribution transcortical infarct. Encephalomalacia within the right middle frontal gyrus, consistent with a chronic infarct. Multiple   bilateral punctate gyral calcifications. Mild generalized parenchymal volume loss. No acute hydrocephalus.     VISUALIZED ORBITS/SINUSES/MASTOIDS: No acute intraorbital abnormality. No significant paranasal sinus or mastoid mucosal disease.     BONES/SOFT TISSUES: No acute abnormality. Degenerative changes of the left temporomandibular joint.    HEAD CTA:  ANTERIOR CIRCULATION: No high-grade stenosis/occlusion, aneurysm, or high flow vascular malformation. A 2 mm right posterior communicating artery origin outpouching, likely an infundibulum. Standard [place] anatomy.    POSTERIOR CIRCULATION: No high-grade stenosis/occlusion, aneurysm, or high flow vascular malformation. Dominant left and smaller right vertebral artery contribute to a normal basilar artery.     DURAL VENOUS SINUSES: Expected enhancement of the major dural venous sinuses.    NECK CTA:  RIGHT CAROTID: No measurable stenosis or dissection.    LEFT CAROTID: No measurable  stenosis or dissection.    VERTEBRAL ARTERIES: No focal high-grade stenosis or dissection. Balanced vertebral arteries.    AORTIC ARCH: Classic aortic arch anatomy with no significant stenosis at the origin of the great vessels.    NONVASCULAR STRUCTURES: A right upper lobe pulmonary cyst and a calcified granuloma.      Impression    IMPRESSION:   HEAD CT:  1.  No acute intracranial hemorrhage, extra-axial fluid collection, or mass effect.  2.  Bilateral cerebral white matter hypodensities. While they may represent the sequela of chronic microvascular ischemia, they remain age-indeterminate in the absence of prior exams for comparison and may mask a small acute infarct. An MRI of the brain   may be helpful for further evaluation, as clinically indicated.  3.  Chronic changes, as above.    HEAD CTA:   1.  No large vessel occlusion, high-grade stenosis, or aneurysm.    NECK CTA:  1.  No carotid or vertebral artery hemodynamically significant stenosis, dissection, or pseudoaneurysm.   Head CT w/o contrast    Addendum: 11/7/2022    Findings of unenhanced CT were discussed over the phone with Dr. Roldan on 11/07/2022 at 1801 CST.          Narrative    EXAM: CTA HEAD NECK W CONTRAST, CT HEAD W/O CONTRAST  LOCATION: [facility]  DATE/TIME: 11/7/2022 5:42 PM    INDICATION: Left-sided weakness and left facial droop.  COMPARISON: None.  CONTRAST: 75 ml Isovue 370  TECHNIQUE: Head and neck CT angiogram with IV contrast. Noncontrast head CT followed by axial helical CT images of the head and neck vessels obtained during the arterial phase of intravenous contrast administration. Axial 2D reconstructed images and   multiplanar 3D MIP reconstructed images of the head and neck vessels were performed by the technologist. Dose reduction techniques were used. All stenosis measurements made according to NASCET criteria unless otherwise specified.    FINDINGS:   NONCONTRAST HEAD CT:   INTRACRANIAL CONTENTS: No  acute intracranial hemorrhage, extraaxial collection, or mass effect.  Scattered hypodensities within the bilateral periventricular, deep, and subcortical cerebral white matter, nonspecific although may be related to the chronic   microvascular ischemia. These limits the assessment for a small acute infarct. No evidence of an acute large vascular distribution transcortical infarct. Encephalomalacia within the right middle frontal gyrus, consistent with a chronic infarct. Multiple   bilateral punctate gyral calcifications. Mild generalized parenchymal volume loss. No acute hydrocephalus.     VISUALIZED ORBITS/SINUSES/MASTOIDS: No acute intraorbital abnormality. No significant paranasal sinus or mastoid mucosal disease.     BONES/SOFT TISSUES: No acute abnormality. Degenerative changes of the left temporomandibular joint.    HEAD CTA:  ANTERIOR CIRCULATION: No high-grade stenosis/occlusion, aneurysm, or high flow vascular malformation. A 2 mm right posterior communicating artery origin outpouching, likely an infundibulum. Standard [place] anatomy.    POSTERIOR CIRCULATION: No high-grade stenosis/occlusion, aneurysm, or high flow vascular malformation. Dominant left and smaller right vertebral artery contribute to a normal basilar artery.     DURAL VENOUS SINUSES: Expected enhancement of the major dural venous sinuses.    NECK CTA:  RIGHT CAROTID: No measurable stenosis or dissection.    LEFT CAROTID: No measurable stenosis or dissection.    VERTEBRAL ARTERIES: No focal high-grade stenosis or dissection. Balanced vertebral arteries.    AORTIC ARCH: Classic aortic arch anatomy with no significant stenosis at the origin of the great vessels.    NONVASCULAR STRUCTURES: A right upper lobe pulmonary cyst and a calcified granuloma.      Impression    IMPRESSION:   HEAD CT:  1.  No acute intracranial hemorrhage, extra-axial fluid collection, or mass effect.  2.  Bilateral cerebral white matter hypodensities. While  they may represent the sequela of chronic microvascular ischemia, they remain age-indeterminate in the absence of prior exams for comparison and may mask a small acute infarct. An MRI of the brain   may be helpful for further evaluation, as clinically indicated.  3.  Chronic changes, as above.    HEAD CTA:   1.  No large vessel occlusion, high-grade stenosis, or aneurysm.    NECK CTA:  1.  No carotid or vertebral artery hemodynamically significant stenosis, dissection, or pseudoaneurysm.   MR Brain w/o & w Contrast     Value    Radiologist flags Acute infarcts (AA)    Narrative    EXAM: MR BRAIN W/O and W CONTRAST  LOCATION: [facility]  DATE/TIME: 11/7/2022 8:24 PM    INDICATION: Encephalopathy  COMPARISON:  Earlier same day CTs.  CONTRAST: Gadavist 8 mL  TECHNIQUE: Routine multiplanar multisequence head MRI without and with intravenous contrast.    FINDINGS:  INTRACRANIAL CONTENTS: Numerous punctate scattered bilateral supratentorial and infratentorial acute infarcts, specifically involving the right hippocampus (series 3.2, image 13). No mass, acute hemorrhage, or extra-axial fluid collections. Scattered   nonspecific T2/FLAIR hyperintensities within the cerebral white matter most consistent with mild chronic microvascular ischemic change. Mild generalized cerebral atrophy. No hydrocephalus. Normal position of the cerebellar tonsils. No pathologic contrast   enhancement.    SELLA: No abnormality accounting for technique.    OSSEOUS STRUCTURES/SOFT TISSUES: Normal marrow signal. The major intracranial vascular flow voids are maintained.     ORBITS: No abnormality accounting for technique.     SINUSES/MASTOIDS: No paranasal sinus mucosal disease. No middle ear or mastoid effusion.       Impression    IMPRESSION:  1.  Numerous punctate scattered supratentorial and infratentorial acute infarcts, specifically involving the right hippocampus. This distribution is suspicious for a  thromboembolic etiology. No significant mass effect or hemorrhagic conversion.       [Critical Result: Acute infarcts]    Finding was identified on 11/7/2022 8:24 PM.     Dr. Nick Roldan was contacted by me on 11/7/2022 8:33 PM and verbalized understanding of the critical result.    XR Chest 1 View    Narrative    EXAM: XR CHEST 1 VIEW  LOCATION: [facility]  DATE/TIME: 11/7/2022 7:28 PM    INDICATION: encephalopathy  COMPARISON: None.      Impression    IMPRESSION: Low lung volumes. No definite focal pneumonia. Heart size likely upper limits of normal with heavy calcification of mitral annulus. Mild central hilar congestion may relate to the low lung volumes.  No metallic foreign body.   XR Abdomen 1 View    Narrative    EXAM: XR ABDOMEN 1 VIEW  LOCATION: [facility]  DATE/TIME: 11/7/2022 7:27 PM    INDICATION: encephalopathy Pre MRI screening  COMPARISON: None.      Impression    IMPRESSION: Negative abdomen. Bowel gas pattern is normal. Nothing for obstruction or free air. Contrast present throughout the urinary collecting system. No metallic foreign body identified.   CT Chest/Abdomen/Pelvis w Contrast    Narrative    CT CHEST/ABDOMEN/PELVIS WITH CONTRAST 11/8/2022 9:45 AM    CLINICAL HISTORY: Evaluate malignancy; patient with thromboembolic  CVA.    TECHNIQUE: CT scan of the chest, abdomen, and pelvis was performed  following injection of IV contrast. Multiplanar reformats were  obtained. Dose reduction techniques were used.     CONTRAST: 78 mL Isovue 370    COMPARISON: None.    FINDINGS:   LUNGS AND PLEURA: Evaluation of the lung parenchyma is limited by  respiratory motion artifact. A benign densely calcified subpleural  granuloma is seen in the posterior aspect of the right upper lobe. No  suspicious appearing pulmonary nodules or masses. No airspace  consolidation or pleural fluid. Mild bibasilar atelectasis. Central  airways are  patent.    MEDIASTINUM/AXILLAE: There are several prominent and mildly enlarged  mediastinal lymph nodes present. Largest example is seen in the  precarinal region measuring 15 mm in short axis (2-45). No enlarged  axillary or hilar lymph nodes. Heart size is mildly enlarged. No  pericardial effusion. Thoracic aorta is normal in course and caliber.  Mild to moderate thoracic aortic atherosclerosis is present. Moderate  three-vessel coronary artery atherosclerosis is seen. Coarse  calcifications of the mitral annulus are noted.    HEPATOBILIARY: Peripherally calcified gallstones are seen within the  gallbladder lumen. No gallbladder wall thickening or pericholecystic  fluid. No bile duct dilation. Nonmass-like low density is seen in the  anterior aspect of the liver near the ligament of teres, favoring  fatty infiltration. No follow-up is necessary. Liver contour is  smooth.    PANCREAS: Normal.    SPLEEN: Normal.    ADRENAL GLANDS: Normal.    KIDNEYS/BLADDER: Low-attenuation subcentimeter renal lesion(s). These  are compatible with small benign cysts and no specific imaging  evaluation or follow-up is recommended. No nephrolithiasis or  hydronephrosis. Urinary bladder is partially decompressed. There is  mild urinary bladder wall thickening. A couple tiny foci of gas are  seen in the urinary bladder lumen.    BOWEL: Colonic diverticulosis is present without findings of  diverticulitis. Large and small bowel loops are nonobstructed and  noninflamed. Appendix is normal-appearing.    PELVIC ORGANS: There is trace nonspecific free pelvic fluid present.  No cystic or solid pelvic mass.    ADDITIONAL FINDINGS: Moderate to severe atherosclerosis of the  abdominal aorta and iliac arteries is present. No aneurysmal dilation.  Small fat-containing noninflamed right inguinal hernia. No enlarged  abdominal or pelvic lymph nodes.    MUSCULOSKELETAL: Multilevel hypertrophic and degenerative changes of  the spine are present.  There is mild superior endplate compression  deformity of the T9 vertebral body. Several remote, healed left  anterior rib fractures. No acute osseous abnormality.      Impression    IMPRESSION:  1.  Several prominent to mildly enlarged nonspecific mediastinal lymph  nodes.  2.  Atherosclerotic vascular disease, including at least moderate  coronary artery atherosclerosis.  3.  Cholelithiasis without evidence of acute cholecystitis.  4.  Colonic diverticulosis without signs of diverticulitis. Mild  circumferential urinary bladder wall thickening and tiny foci of gas  in the urinary bladder lumen, which can be seen with cystitis.  Consider correlation with urinalysis if clinically indicated.  5.  Nonspecific trace free pelvic fluid.    DULCE POLANCO MD         SYSTEM ID:  [id number]   XR Chest Port 1 View    Narrative    EXAM: CHEST SINGLE VIEW PORTABLE  LOCATION: [facility]  DATE/TIME: 11/10/2022 2:19 AM    INDICATION: Fever.  COMPARISON: 11/8/2022 - CT chest, abdomen and pelvis.    FINDINGS: Mildly increased interstitial and hazy opacities in both lungs, left side more prominent than right. Unchanged cardiac silhouette. Atherosclerotic calcification in the thoracic aorta.      Impression    IMPRESSION: Mildly increased interstitial and hazy opacities in both lungs, left side more prominent than right. These are nonspecific, but could be infectious or inflammatory in etiology. Pulmonary edema is another possibility. These findings are new   since the comparison CT scan dated 11/8/2022.    CT Chest w Contrast    Narrative    CT CHEST W CONTRAST 11/10/2022 10:17 AM    CLINICAL HISTORY: SOB, CXR with opacities ?infection vs volume OL, BNP  elevated but not clincally volume OL. Need CT to further eval.  TECHNIQUE: CT chest with IV contrast. Multiplanar reformats were  obtained. Dose reduction techniques were used.    CONTRAST: 86mL isovue 370    COMPARISON: Chest radiograph 11/10/2022 and  chest CT 2022.    FINDINGS:   LUNGS AND PLEURA: New mild patchy nodular opacities in the left lower  lobe consistent with pneumonia. Lungs are otherwise clear. The central  airways are patent.No pleural effusion.    MEDIASTINUM/AXILLAE: Mild mediastinal lymphadenopathy has minimally  changed since recent comparison. The largest left paratracheal lymph  node measures 1.4 cm. No aortic aneurysm. Aortic annulus and dense  mitral annulus calcifications.    CORONARY ARTERY CALCIFICATION: Moderate.    UPPER ABDOMEN: Cholelithiasis.    MUSCULOSKELETAL: Old left rib fractures.      Impression    IMPRESSION:   1.  Mild left lower lobe pneumonia.  2.  Mild nonspecific mediastinal lymphadenopathy without significant  change since 2022.  3.  Cholelithiasis.    ARABELLA ROLON MD         SYSTEM ID:  [id number]   XR Chest Port 1 View    Narrative    EXAM: XR CHEST PORT 1 VIEW  LOCATION: [facility]  DATE/TIME: 2022 5:39 AM    INDICATION: resp. rate 40, increased oxygen demands, possible fluid overload.  COMPARISON: 11/10/2022.      Impression    IMPRESSION: Persistent left lower lobe infiltrate consistent with pneumonia. Increased interstitial markings consistent with congestion and edema. Normal heart size. No pneumothorax.   Echocardiogram Complete     Value    LVEF  65-70%    Narrative    [id number]  [id number]  [id number]  [id number]^GAGE^HARRY^ARGENIS     [facility]  Echocardiography Laboratory  [address] [place] [address]     Name: RADHIKA OSULLIVAN  MRN: [id number]  : 1945  Study Date: 2022 02:13 PM  Age: 77 yrs  Gender: Male  Patient Location: [facility]  Reason For Study: CVA  Ordering Physician: HARRY ALMONTE  Referring Physician: Saleem Jones  Performed By: Jodi Long RDCS     BSA: 2.0 m2  Height: 69 in  Weight: 176 lb  HR: 81  BP: 112/60 mmHg  ______________________________________________________________________________  Procedure  Complete  Portable Bubble Echo Adult. Optison (NDC #[id number]) given  intravenously.  ______________________________________________________________________________  Interpretation Summary     There is mild concentric left ventricular hypertrophy.  There is no thrombus seen in the left ventricle.  The left atrium is moderately dilated.  A contrast injection (Bubble Study) was performed that was negative for flow  across the interatrial septum.  The mitral valve leaflets are moderately thickened.  There is severe mitral annular calcification.  There is moderate to mod-severe (2-3+) mitral regurgitation.  There is moderate mitral stenosis.  The mean mitral valve gradient is (at afib HR 85) 9-11mmHg.  Mild (35-45mmHg) pulmonary hypertension is present.  Severe valvular aortic stenosis.  ______________________________________________________________________________  Left Ventricle  The left ventricle is normal in size. There is mild concentric left  ventricular hypertrophy. The visual ejection fraction is 65-70%. Diastolic  function not assessed due to atrial fibrillation. Normal left ventricular wall  motion. There is no thrombus seen in the left ventricle.     Right Ventricle  The right ventricle is normal in size and function.     Atria  The left atrium is moderately dilated. Right atrial size is normal. A contrast  injection (Bubble Study) was performed that was negative for flow across the  interatrial septum.     Mitral Valve  The mitral valve leaflets are moderately thickened. There is severe mitral  annular calcification. There is moderate to mod-severe (2-3+) mitral  regurgitation. There is moderate mitral stenosis. The mean mitral valve  gradient is (at afib HR 85) 9-11mmHg.     Tricuspid Valve  There is trace to mild tricuspid regurgitation. The right ventricular systolic  pressure is approximated at 28.7 mmHg plus the right atrial pressure. Mild  (35-45mmHg) pulmonary hypertension is present. IVC diameter >2.1 cm  collapsing  <50% with sniff suggests a high RA pressure estimated at 15 mmHg or greater.     Aortic Valve  The aortic valve is not well visualized. Severe valvular aortic stenosis. The  mean AoV pressure gradient is 43.9 mmHg.     Pulmonic Valve  The pulmonic valve is not well seen, but is grossly normal.     Vessels  The aortic root is not well visualized.     Pericardium  The pericardium appears normal.     Rhythm  The rhythm was atrial fibrillation with controlled ventricular rate at rest.  ______________________________________________________________________________  MMode/2D Measurements & Calculations  IVSd: 1.1 cm     LVIDd: 4.1 cm  LVIDs: 2.8 cm  LVPWd: 1.3 cm  FS: 31.6 %  LV mass(C)d: 172.0 grams  LV mass(C)dI: 87.9 grams/m2  Ao root diam: 3.7 cm  LA dimension: 4.3 cm  asc Aorta Diam: 3.4 cm  LA/Ao: 1.2  LVOT diam: 2.2 cm  LVOT area: 3.7 cm2  LA Volume (BP): 103.0 ml  LA Volume Index (BP): 52.6 ml/m2  RWT: 0.64     Doppler Measurements & Calculations  MV max P.6 mmHg  MV mean PG: 10.5 mmHg  MV V2 VTI: 58.3 cm  MVA(VTI): 1.1 cm2  Ao V2 max: 436.4 cm/sec  Ao max P.0 mmHg  Ao V2 mean: 306.3 cm/sec  Ao mean P.9 mmHg  Ao V2 VTI: 74.0 cm  KARAN(I,D): 0.89 cm2  KARAN(V,D): 0.85 cm2  LV V1 max P.1 mmHg  LV V1 max: 100.6 cm/sec  LV V1 VTI: 17.8 cm  SV(LVOT): 66.0 ml  SI(LVOT): 33.7 ml/m2  TR max naeem: 267.6 cm/sec  TR max P.7 mmHg  AV Naeem Ratio (DI): 0.23  KARAN Index (cm2/m2): 0.46     ______________________________________________________________________________  Report approved by: Zita Chino 2022 03:23 PM         Echocardiogram Limited     Value    LVEF  65-70%    Narrative    [id number]  [id number]  [id number]  [id number]^GAGE^HARRY^ARGENIS     [facility]  Echocardiography Laboratory  [address] [place] [address]     Name: RADHIKA OSULLIVAN  MRN: [id number]  : 1945  Study Date: 2022 09:52 AM  Age: 77 yrs  Gender: Male  Patient Location:  [facility]  Reason For Study: Recheck EF, ? WMA, worsening respiratory failure  Ordering Physician: HARRY ALMONTE  Referring Physician: Saleem Jones  Performed By: Michelle Lopez RDCS     BSA: 1.9 m2  Height: 69 in  Weight: 173 lb  HR: 75  BP: 150/98 mmHg  ______________________________________________________________________________  Procedure  Limited Portable Echo Adult. Optison (NDC #[id number]) given intravenously.  Full echo done 1 week ago on 11/7/22.  ______________________________________________________________________________  Interpretation Summary     Left ventricular systolic function is normal.  The visual ejection fraction is 65-70%.  No regional wall motion abnormalities noted.  Severe valvular aortic stenosis.  There is moderate to severe mitral stenosis.  Calcified mitral apparatus causing mitral stenosis.  The mean mitral valve gradient is 9mmHg.(91bpm)  There is moderate (2+) mitral regurgitation.  No significant change compared to priro study from 11/8/22. The study was  technically limited.  ______________________________________________________________________________  Left Ventricle  The left ventricle is normal in size. Left ventricular systolic function is  normal. The visual ejection fraction is 65-70%. No regional wall motion  abnormalities noted.     Right Ventricle  The right ventricle is normal size. The right ventricular systolic function is  normal.     Mitral Valve  There is severe mitral annular calcification. There is moderate (2+) mitral  regurgitation. (91bpm). The mean mitral valve gradient is 9mmHg. There is  moderate to severe mitral stenosis. Calcified mitral apparatus causing mitral  stenosis.     Tricuspid Valve  There is mild (1+) tricuspid regurgitation. The right ventricular systolic  pressure is approximated at 35.2 mmHg plus the right atrial pressure. IVC  diameter >2.1 cm collapsing <50% with sniff suggests a high RA pressure  estimated at 15 mmHg or greater.     Aortic  Valve  The aortic valve is not well visualized. It is severely calcified. Severe  valvular aortic stenosis. The peak AoV pressure gradient is 69.3 mmHg.     Pericardium  There is no pericardial effusion.  ______________________________________________________________________________  Doppler Measurements & Calculations  MV max P.1 mmHg  MV mean P.0 mmHg  MV V2 VTI: 44.9 cm  Ao V2 max: 415.9 cm/sec  Ao max P.3 mmHg  TR max rubén: 295.8 cm/sec  TR max P.2 mmHg     ______________________________________________________________________________  Report approved by: Zita Sheikh 2022 12:03 PM             Procedures    Ortega catheter placement and removal    Discharge Orders      General info for SNF    Length of Stay Estimate: Short Term Care: Estimated # of Days <30  Condition at Discharge: Improving  Level of care:skilled   Rehabilitation Potential: Excellent  Admission H&P remains valid and up-to-date: Yes  Recent Chemotherapy: N/A  Use Nursing Home Standing Orders: Yes     Mantoux instructions    Give two-step Mantoux (PPD) Per Facility Policy Yes     Follow Up and recommended labs and tests    Follow up with Nursing home physician. Follow up CT chest in 3 months to eval mediastinal lymphadenopathy and if not resolved, may need biopsy.     Reason for your hospital stay    Multiple issues this admission including influenza A and secondary pneumonia, atrial fibrillation and stroke.     Glucose monitor nursing POCT    Before meals and at bedtime     Activity - Up with nursing assistance     Physical Therapy Adult Consult    Evaluate and treat as clinically indicated.    Reason:  weakness, stroke without severe motor deficits     Occupational Therapy Adult Consult    Evaluate and treat as clinically indicated.    Reason:  post stroke     Diet    Follow this diet upon discharge:       Combination Diet 2 gm NA Diet; Thin Liquids (level 0)     Stroke Hospital Follow Up    [facility] will  call you to coordinate care as prescribed by your provider. If you don t hear from a representative within 2 business days, please call [phone].       Discharge Medications   Current Discharge Medication List      START taking these medications    Details   apixaban ANTICOAGULANT (ELIQUIS) 5 MG tablet Take 1 tablet (5 mg) by mouth 2 times daily    Associated Diagnoses: Longstanding persistent atrial fibrillation (H)      atorvastatin (LIPITOR) 10 MG tablet Take 1 tablet (10 mg) by mouth daily    Associated Diagnoses: Ischemic stroke (H)      furosemide (LASIX) 20 MG tablet Take 1 tablet (20 mg) by mouth daily    Associated Diagnoses: Chronic heart failure with preserved ejection fraction (HFpEF) (H)      senna (SENOKOT) 8.6 MG tablet Take 2 tablets by mouth 2 times daily as needed for constipation    Associated Diagnoses: Constipation, unspecified constipation type      tamsulosin (FLOMAX) 0.4 MG capsule Take 1 capsule (0.4 mg) by mouth daily    Associated Diagnoses: Benign prostatic hyperplasia with urinary retention         CONTINUE these medications which have CHANGED    Details   lisinopril (ZESTRIL) 40 MG tablet Take 0.5 tablets (20 mg) by mouth daily    Associated Diagnoses: Primary hypertension         CONTINUE these medications which have NOT CHANGED    Details   B Complex-C (SUPER B COMPLEX PO) Take 1 tablet by mouth daily      metoprolol succinate ER (TOPROL XL) 100 MG 24 hr tablet Take 1 tablet by mouth daily      sildenafil (VIAGRA) 100 MG tablet Take 50 mg by mouth daily as needed         STOP taking these medications       amLODIPine (NORVASC) 10 MG tablet Comments:   Reason for Stopping:         hydrochlorothiazide (HYDRODIURIL) 12.5 MG tablet Comments:   Reason for Stopping:         VITAMIN K PO Comments:   Reason for Stopping:             Allergies   No Known Allergies

## 2022-11-16 NOTE — PROGRESS NOTES
Care Management Discharge Note    Discharge Date: 11/16/2022       Discharge Disposition: Transitional Care    Discharge Services: None    Discharge DME: Wheelchair    Discharge Transportation: family or friend will provide    Private pay costs discussed: transportation costs    PAS Confirmation Code: [id number] ([id number])  Patient/family educated on Medicare website which has current facility and service quality ratings: yes    Education Provided on the Discharge Plan:  yes  Persons Notified of Discharge Plans: Patient, wife Luba and [facility] Admissions  Patient/Family in Agreement with the Plan: yes    Handoff Referral Completed: Yes    Additional Information:    Plan:  [facility] TCU.  Transportation provided by  [employer].      Erica Armijo RN

## 2022-11-16 NOTE — PLAN OF CARE
Occupational Therapy Discharge Summary    Reason for therapy discharge:    Discharged to transitional care facility.    Progress towards therapy goal(s). See goals on Care Plan in [facility] electronic health record for goal details.  Goals partially met.  Barriers to achieving goals:   discharge from facility.    Therapy recommendation(s):    Continued therapy is recommended.  Rationale/Recommendations:  to increase independence with ADLs and functional mobility. continue to monitor cognition. SLUMS=18/30 on 11/15/22

## 2022-11-16 NOTE — PROGRESS NOTES
Speech Language Therapy Discharge Summary    Reason for therapy discharge:    Goals met for acute care setting.  Plans to discharge to TCU today.    Progress towards therapy goal(s). See goals on Care Plan in [facility] electronic health record for goal details.  Goals met    Therapy recommendation(s):    Continued therapy is recommended.  Rationale/Recommendations:  Goals met for dysphagia and pt eating a regular diet consistency.  Pt continues to have short term memory deficits and may benefit from continued SLP services at TCU for cognitive-linguistic deficits..

## 2022-11-16 NOTE — PLAN OF CARE
Physical Therapy Discharge Summary    Reason for therapy discharge:    Discharged to transitional care facility.    Progress towards therapy goal(s). See goals on Care Plan in [facility] electronic health record for goal details.  Goals partially met.  Barriers to achieving goals:   discharge from facility.    Therapy recommendation(s):    Continued therapy is recommended.  Rationale/Recommendations:  Recommend continued PT at TCU to maximize safe and indep mobility prior to return home.

## 2022-11-16 NOTE — CARE PLAN
[facility] DISCHARGE NOTE    Patient discharged to transitional care unit at 1:16 PM via wheel chair. Accompanied by other:Family and staff. Discharge instructions reviewed with patient and caregiver, opportunity offered to ask questions. Prescriptions - None ordered for discharge. All belongings sent with patient.    Nick So RN

## 2022-12-06 ENCOUNTER — TELEPHONE (OUTPATIENT)
Dept: NEUROLOGY | Facility: CLINIC | Age: 77
End: 2022-12-06

## 2022-12-06 NOTE — TELEPHONE ENCOUNTER
SPoke to TCU- discharged- spoke to wife, she stated patient passed away. FYI to provider pool.    VEDA STEINER, CMA

## 2022-12-06 NOTE — TELEPHONE ENCOUNTER
Stroke RN Care Coordination - Chart Review Note    SITUATION     Leon Alvarado is a 77 year old male who is receiving support for:  Clinic Care Coordination - Post Hospital (Stroke Neurology Follow-Up Appt)    BACKGROUND     Leon Alvarado is a 77 year old male with PMH aortic valve stenosis, HTN with acute onset LUE weakness, L facial droop. MRI bilateral anterior and posterior circulation acute/ subacute small embolic appearing ischemic strokes. CTA showed no LVO, joshua ICA athero calcificaiton without significant stenosis. EKG showed atrial fibrillation which is a new diagnosis. Troponin also elevated. Several prominent mediastinal lymph nodes noted on CT CAP, otherwise no evidence of malignancy as underlying source for hypercoagulability.    ASSESSMENT     Patient was referred to follow up with general neurology on discharge. Central scheduling unable to facilitate appt d/t pt discharging to TCU facility.    MEREDITH Dodd, please reach out to TCU to provide scheduling recommendations and clinic number to [facility]. Please fax referral order to appropriate location.    Gayle Pittman BS, RN, SCRN  RN Stroke Neurology Care Coordinator  [facility] Neuroscience Service Line